# Patient Record
Sex: FEMALE | Race: WHITE | NOT HISPANIC OR LATINO | Employment: FULL TIME | URBAN - NONMETROPOLITAN AREA
[De-identification: names, ages, dates, MRNs, and addresses within clinical notes are randomized per-mention and may not be internally consistent; named-entity substitution may affect disease eponyms.]

---

## 2018-01-30 ENCOUNTER — TRANSCRIBE ORDERS (OUTPATIENT)
Dept: ADMINISTRATIVE | Facility: HOSPITAL | Age: 33
End: 2018-01-30

## 2018-01-30 ENCOUNTER — APPOINTMENT (OUTPATIENT)
Dept: LAB | Facility: HOSPITAL | Age: 33
End: 2018-01-30
Attending: FAMILY MEDICINE

## 2018-01-30 DIAGNOSIS — Z00.00 ENCOUNTER FOR GENERAL ADULT MEDICAL EXAMINATION WITHOUT ABNORMAL FINDINGS: Primary | ICD-10-CM

## 2018-01-30 LAB
25(OH)D3 SERPL-MCNC: 39.7 NG/ML (ref 30–100)
ALBUMIN SERPL-MCNC: 4.5 G/DL (ref 3.5–5)
ALBUMIN/GLOB SERPL: 1.6 G/DL (ref 1.1–2.5)
ALP SERPL-CCNC: 52 U/L (ref 24–120)
ALT SERPL W P-5'-P-CCNC: 29 U/L (ref 0–54)
ANION GAP SERPL CALCULATED.3IONS-SCNC: 12 MMOL/L (ref 4–13)
ARTICHOKE IGE QN: 75 MG/DL (ref 0–99)
AST SERPL-CCNC: 25 U/L (ref 7–45)
BASOPHILS # BLD AUTO: 0.05 10*3/MM3 (ref 0–0.2)
BASOPHILS NFR BLD AUTO: 1.2 % (ref 0–2)
BILIRUB SERPL-MCNC: 0.2 MG/DL (ref 0.1–1)
BUN BLD-MCNC: 19 MG/DL (ref 5–21)
BUN/CREAT SERPL: 22.1 (ref 7–25)
CALCIUM SPEC-SCNC: 9.5 MG/DL (ref 8.4–10.4)
CHLORIDE SERPL-SCNC: 105 MMOL/L (ref 98–110)
CHOLEST SERPL-MCNC: 136 MG/DL (ref 130–200)
CO2 SERPL-SCNC: 28 MMOL/L (ref 24–31)
CREAT BLD-MCNC: 0.86 MG/DL (ref 0.5–1.4)
DEPRECATED RDW RBC AUTO: 42.2 FL (ref 40–54)
EOSINOPHIL # BLD AUTO: 0.17 10*3/MM3 (ref 0–0.7)
EOSINOPHIL NFR BLD AUTO: 4 % (ref 0–4)
ERYTHROCYTE [DISTWIDTH] IN BLOOD BY AUTOMATED COUNT: 12.8 % (ref 12–15)
GFR SERPL CREATININE-BSD FRML MDRD: 76 ML/MIN/1.73
GLOBULIN UR ELPH-MCNC: 2.9 GM/DL
GLUCOSE BLD-MCNC: 88 MG/DL (ref 70–100)
HCT VFR BLD AUTO: 42.4 % (ref 37–47)
HDLC SERPL-MCNC: 53 MG/DL
HGB BLD-MCNC: 13.9 G/DL (ref 12–16)
IMM GRANULOCYTES # BLD: 0.01 10*3/MM3 (ref 0–0.03)
IMM GRANULOCYTES NFR BLD: 0.2 % (ref 0–5)
LDLC/HDLC SERPL: 1.35 {RATIO}
LYMPHOCYTES # BLD AUTO: 1.27 10*3/MM3 (ref 0.72–4.86)
LYMPHOCYTES NFR BLD AUTO: 29.5 % (ref 15–45)
MCH RBC QN AUTO: 29.4 PG (ref 28–32)
MCHC RBC AUTO-ENTMCNC: 32.8 G/DL (ref 33–36)
MCV RBC AUTO: 89.8 FL (ref 82–98)
MONOCYTES # BLD AUTO: 0.36 10*3/MM3 (ref 0.19–1.3)
MONOCYTES NFR BLD AUTO: 8.4 % (ref 4–12)
NEUTROPHILS # BLD AUTO: 2.44 10*3/MM3 (ref 1.87–8.4)
NEUTROPHILS NFR BLD AUTO: 56.7 % (ref 39–78)
NRBC BLD MANUAL-RTO: 0 /100 WBC (ref 0–0)
PLATELET # BLD AUTO: 212 10*3/MM3 (ref 130–400)
PMV BLD AUTO: 9.9 FL (ref 6–12)
POTASSIUM BLD-SCNC: 4 MMOL/L (ref 3.5–5.3)
PROT SERPL-MCNC: 7.4 G/DL (ref 6.3–8.7)
RBC # BLD AUTO: 4.72 10*6/MM3 (ref 4.2–5.4)
SODIUM BLD-SCNC: 145 MMOL/L (ref 135–145)
TRIGL SERPL-MCNC: 58 MG/DL (ref 0–149)
TSH SERPL DL<=0.05 MIU/L-ACNC: 1.79 MIU/ML (ref 0.47–4.68)
WBC NRBC COR # BLD: 4.3 10*3/MM3 (ref 4.8–10.8)

## 2018-01-30 PROCEDURE — 36415 COLL VENOUS BLD VENIPUNCTURE: CPT

## 2018-01-30 PROCEDURE — 80061 LIPID PANEL: CPT | Performed by: FAMILY MEDICINE

## 2018-01-30 PROCEDURE — 84443 ASSAY THYROID STIM HORMONE: CPT | Performed by: FAMILY MEDICINE

## 2018-01-30 PROCEDURE — 85025 COMPLETE CBC W/AUTO DIFF WBC: CPT | Performed by: FAMILY MEDICINE

## 2018-01-30 PROCEDURE — 80053 COMPREHEN METABOLIC PANEL: CPT | Performed by: FAMILY MEDICINE

## 2018-01-30 PROCEDURE — 82306 VITAMIN D 25 HYDROXY: CPT | Performed by: FAMILY MEDICINE

## 2018-04-18 ENCOUNTER — PROCEDURE VISIT (OUTPATIENT)
Dept: OBSTETRICS AND GYNECOLOGY | Facility: CLINIC | Age: 33
End: 2018-04-18

## 2018-04-18 ENCOUNTER — OFFICE VISIT (OUTPATIENT)
Dept: OBSTETRICS AND GYNECOLOGY | Facility: CLINIC | Age: 33
End: 2018-04-18

## 2018-04-18 VITALS
DIASTOLIC BLOOD PRESSURE: 62 MMHG | BODY MASS INDEX: 20.31 KG/M2 | WEIGHT: 134 LBS | HEIGHT: 68 IN | SYSTOLIC BLOOD PRESSURE: 110 MMHG

## 2018-04-18 DIAGNOSIS — Z78.9 NON-SMOKER: ICD-10-CM

## 2018-04-18 DIAGNOSIS — O20.9 VAGINAL BLEEDING IN PREGNANCY, FIRST TRIMESTER: ICD-10-CM

## 2018-04-18 DIAGNOSIS — O20.0 THREATENED MISCARRIAGE: Primary | ICD-10-CM

## 2018-04-18 DIAGNOSIS — O20.0 THREATENED ABORTION: Primary | ICD-10-CM

## 2018-04-18 PROCEDURE — 99213 OFFICE O/P EST LOW 20 MIN: CPT | Performed by: NURSE PRACTITIONER

## 2018-04-18 PROCEDURE — 76817 TRANSVAGINAL US OBSTETRIC: CPT | Performed by: OBSTETRICS & GYNECOLOGY

## 2018-04-18 NOTE — PROGRESS NOTES
"Subjective   Oneil Kothari is a 32 y.o. female.     Positive pregnancy test, spotting.     Pt is concerned about spotting r/t hx of miscarriage.         The following portions of the patient's history were reviewed and updated as appropriate: allergies, current medications, past family history, past medical history, past social history, past surgical history and problem list.    /62   Ht 172.7 cm (68\")   Wt 60.8 kg (134 lb)   LMP 03/09/2018   BMI 20.37 kg/m²     Review of Systems   Constitutional: Positive for fatigue. Negative for activity change, appetite change and fever.   Respiratory: Negative for apnea and shortness of breath.    Cardiovascular: Negative for chest pain and palpitations.   Gastrointestinal: Negative for abdominal distention, abdominal pain, constipation, diarrhea, nausea and vomiting.   Endocrine: Negative for cold intolerance and heat intolerance.   Genitourinary: Negative for difficulty urinating, frequency, menstrual problem, pelvic pain, vaginal discharge and vaginal pain.        Spotting, none today.   Neurological: Negative for headaches.   Psychiatric/Behavioral: Negative for agitation and sleep disturbance.       Objective   Physical Exam   Constitutional: She is oriented to person, place, and time. She appears well-developed.   Eyes: Right eye exhibits no discharge. Left eye exhibits no discharge.   Cardiovascular: Normal rate and regular rhythm.    Pulmonary/Chest: Effort normal and breath sounds normal.   Neurological: She is alert and oriented to person, place, and time.   Skin: Skin is warm.   Psychiatric: She has a normal mood and affect. Her behavior is normal. Judgment and thought content normal.   Vitals reviewed.      Assessment/Plan    LMP 3/9/91140    Discussed US with pt.   US indicates gestational sac and yolk sac are observed and appear normal.     HCG quant today  Blood type B positive.   RV 2 wks US and New OB visit based on labs.     Oneil was seen today " for threatened miscarriage.    Diagnoses and all orders for this visit:    Threatened miscarriage  -     HCG, B-subunit, Quantitative    Vaginal bleeding in pregnancy, first trimester    BMI 20.0-20.9, adult    Non-smoker

## 2018-04-19 LAB — HCG INTACT+B SERPL-ACNC: NORMAL MIU/ML

## 2018-05-03 ENCOUNTER — INITIAL PRENATAL (OUTPATIENT)
Dept: OBSTETRICS AND GYNECOLOGY | Facility: CLINIC | Age: 33
End: 2018-05-03

## 2018-05-03 ENCOUNTER — PROCEDURE VISIT (OUTPATIENT)
Dept: OBSTETRICS AND GYNECOLOGY | Facility: CLINIC | Age: 33
End: 2018-05-03

## 2018-05-03 VITALS — BODY MASS INDEX: 20.68 KG/M2 | SYSTOLIC BLOOD PRESSURE: 104 MMHG | DIASTOLIC BLOOD PRESSURE: 82 MMHG | WEIGHT: 136 LBS

## 2018-05-03 DIAGNOSIS — O36.80X0 ENCOUNTER TO DETERMINE FETAL VIABILITY OF PREGNANCY, SINGLE OR UNSPECIFIED FETUS: Primary | ICD-10-CM

## 2018-05-03 DIAGNOSIS — Z34.81 ENCOUNTER FOR SUPERVISION OF OTHER NORMAL PREGNANCY IN FIRST TRIMESTER: Primary | ICD-10-CM

## 2018-05-03 PROBLEM — Z34.90 SUPERVISION OF NORMAL PREGNANCY: Status: ACTIVE | Noted: 2018-05-03

## 2018-05-03 PROCEDURE — 0501F PRENATAL FLOW SHEET: CPT | Performed by: OBSTETRICS & GYNECOLOGY

## 2018-05-03 PROCEDURE — 76817 TRANSVAGINAL US OBSTETRIC: CPT | Performed by: OBSTETRICS & GYNECOLOGY

## 2018-05-03 NOTE — PROGRESS NOTES
New OB, prior uncomplicated pregnancy and vaginal delivery   Had spotting 2 weeks ago, but has resolved  Discussed normal prenatal routines  New OB labs today, discussed genetic screening options  EDC by today's US consistent with menstrual dating

## 2018-05-05 LAB
ABO GROUP BLD: NORMAL
AMPHETAMINES UR QL SCN: NEGATIVE NG/ML
BACTERIA UR CULT: NORMAL
BACTERIA UR CULT: NORMAL
BARBITURATES UR QL SCN: NEGATIVE NG/ML
BASOPHILS # BLD AUTO: 0.1 X10E3/UL (ref 0–0.2)
BASOPHILS NFR BLD AUTO: 1 %
BENZODIAZ UR QL: NEGATIVE NG/ML
BLD GP AB SCN SERPL QL: NEGATIVE
BZE UR QL: NEGATIVE NG/ML
C TRACH RRNA SPEC QL NAA+PROBE: NEGATIVE
CANNABINOIDS UR QL SCN: NEGATIVE NG/ML
EOSINOPHIL # BLD AUTO: 0.2 X10E3/UL (ref 0–0.4)
EOSINOPHIL NFR BLD AUTO: 3 %
ERYTHROCYTE [DISTWIDTH] IN BLOOD BY AUTOMATED COUNT: 13.7 % (ref 12.3–15.4)
HBV SURFACE AG SERPL QL IA: NEGATIVE
HCT VFR BLD AUTO: 38.6 % (ref 34–46.6)
HGB BLD-MCNC: 13 G/DL (ref 11.1–15.9)
HIV 1+2 AB+HIV1 P24 AG SERPL QL IA: NON REACTIVE
IMM GRANULOCYTES # BLD: 0 X10E3/UL (ref 0–0.1)
IMM GRANULOCYTES NFR BLD: 0 %
LYMPHOCYTES # BLD AUTO: 1.8 X10E3/UL (ref 0.7–3.1)
LYMPHOCYTES NFR BLD AUTO: 23 %
MCH RBC QN AUTO: 30.3 PG (ref 26.6–33)
MCHC RBC AUTO-ENTMCNC: 33.7 G/DL (ref 31.5–35.7)
MCV RBC AUTO: 90 FL (ref 79–97)
METHADONE UR QL SCN: NEGATIVE NG/ML
MONOCYTES # BLD AUTO: 0.5 X10E3/UL (ref 0.1–0.9)
MONOCYTES NFR BLD AUTO: 6 %
N GONORRHOEA RRNA SPEC QL NAA+PROBE: NEGATIVE
NEUTROPHILS # BLD AUTO: 5.4 X10E3/UL (ref 1.4–7)
NEUTROPHILS NFR BLD AUTO: 67 %
OPIATES UR QL: NEGATIVE NG/ML
PCP UR QL: NEGATIVE NG/ML
PLATELET # BLD AUTO: 256 X10E3/UL (ref 150–379)
PROPOXYPH UR QL SCN: NEGATIVE NG/ML
RBC # BLD AUTO: 4.29 X10E6/UL (ref 3.77–5.28)
RH BLD: POSITIVE
RPR SER QL: NON REACTIVE
RUBV IGG SERPL IA-ACNC: 4.65 INDEX
WBC # BLD AUTO: 7.9 X10E3/UL (ref 3.4–10.8)

## 2018-06-11 ENCOUNTER — ROUTINE PRENATAL (OUTPATIENT)
Dept: OBSTETRICS AND GYNECOLOGY | Facility: CLINIC | Age: 33
End: 2018-06-11

## 2018-06-11 VITALS — WEIGHT: 136 LBS | BODY MASS INDEX: 20.68 KG/M2 | SYSTOLIC BLOOD PRESSURE: 118 MMHG | DIASTOLIC BLOOD PRESSURE: 64 MMHG

## 2018-06-11 DIAGNOSIS — Z34.82 ENCOUNTER FOR SUPERVISION OF OTHER NORMAL PREGNANCY IN SECOND TRIMESTER: Primary | ICD-10-CM

## 2018-06-11 PROCEDURE — 0502F SUBSEQUENT PRENATAL CARE: CPT | Performed by: OBSTETRICS & GYNECOLOGY

## 2018-06-11 NOTE — PROGRESS NOTES
Feeling well, minimal nausea  Normal prenatal labs  Discussed round ligament pain  Declines genetic screening

## 2018-07-02 ENCOUNTER — TELEPHONE (OUTPATIENT)
Dept: OBSTETRICS AND GYNECOLOGY | Facility: CLINIC | Age: 33
End: 2018-07-02

## 2018-07-09 ENCOUNTER — ROUTINE PRENATAL (OUTPATIENT)
Dept: OBSTETRICS AND GYNECOLOGY | Facility: CLINIC | Age: 33
End: 2018-07-09

## 2018-07-09 VITALS — SYSTOLIC BLOOD PRESSURE: 122 MMHG | DIASTOLIC BLOOD PRESSURE: 78 MMHG | WEIGHT: 142 LBS | BODY MASS INDEX: 21.59 KG/M2

## 2018-07-09 DIAGNOSIS — Z34.82 ENCOUNTER FOR SUPERVISION OF OTHER NORMAL PREGNANCY IN SECOND TRIMESTER: Primary | ICD-10-CM

## 2018-07-09 PROCEDURE — 0502F SUBSEQUENT PRENATAL CARE: CPT | Performed by: OBSTETRICS & GYNECOLOGY

## 2018-08-06 ENCOUNTER — ROUTINE PRENATAL (OUTPATIENT)
Dept: OBSTETRICS AND GYNECOLOGY | Facility: CLINIC | Age: 33
End: 2018-08-06

## 2018-08-06 VITALS — SYSTOLIC BLOOD PRESSURE: 132 MMHG | DIASTOLIC BLOOD PRESSURE: 70 MMHG | BODY MASS INDEX: 21.74 KG/M2 | WEIGHT: 143 LBS

## 2018-08-06 DIAGNOSIS — Z34.82 ENCOUNTER FOR SUPERVISION OF OTHER NORMAL PREGNANCY IN SECOND TRIMESTER: Primary | ICD-10-CM

## 2018-08-06 PROCEDURE — 0502F SUBSEQUENT PRENATAL CARE: CPT | Performed by: OBSTETRICS & GYNECOLOGY

## 2018-08-06 NOTE — PROGRESS NOTES
Feeling more fetal movement  Feeling occasional palpitation, 1-2 a month, no chest pain  Normal anatomy US  Cervical length 2.9cm

## 2018-09-04 ENCOUNTER — ROUTINE PRENATAL (OUTPATIENT)
Dept: OBSTETRICS AND GYNECOLOGY | Facility: CLINIC | Age: 33
End: 2018-09-04

## 2018-09-04 VITALS — DIASTOLIC BLOOD PRESSURE: 68 MMHG | WEIGHT: 148 LBS | SYSTOLIC BLOOD PRESSURE: 108 MMHG | BODY MASS INDEX: 22.5 KG/M2

## 2018-09-04 DIAGNOSIS — Z34.82 ENCOUNTER FOR SUPERVISION OF OTHER NORMAL PREGNANCY IN SECOND TRIMESTER: Primary | ICD-10-CM

## 2018-09-04 PROCEDURE — 0502F SUBSEQUENT PRENATAL CARE: CPT | Performed by: OBSTETRICS & GYNECOLOGY

## 2018-09-18 ENCOUNTER — ROUTINE PRENATAL (OUTPATIENT)
Dept: OBSTETRICS AND GYNECOLOGY | Facility: CLINIC | Age: 33
End: 2018-09-18

## 2018-09-18 VITALS — SYSTOLIC BLOOD PRESSURE: 104 MMHG | DIASTOLIC BLOOD PRESSURE: 66 MMHG | WEIGHT: 153 LBS | BODY MASS INDEX: 23.26 KG/M2

## 2018-09-18 DIAGNOSIS — Z34.83 ENCOUNTER FOR SUPERVISION OF OTHER NORMAL PREGNANCY IN THIRD TRIMESTER: Primary | ICD-10-CM

## 2018-09-18 LAB
GLUCOSE 1H P 50 G GLC PO SERPL-MCNC: 104 MG/DL (ref 70–140)
HGB BLD-MCNC: 11.9 G/DL (ref 12–16)

## 2018-09-18 PROCEDURE — 0502F SUBSEQUENT PRENATAL CARE: CPT | Performed by: OBSTETRICS & GYNECOLOGY

## 2018-10-02 ENCOUNTER — ROUTINE PRENATAL (OUTPATIENT)
Dept: OBSTETRICS AND GYNECOLOGY | Facility: CLINIC | Age: 33
End: 2018-10-02

## 2018-10-02 VITALS — BODY MASS INDEX: 22.96 KG/M2 | SYSTOLIC BLOOD PRESSURE: 94 MMHG | DIASTOLIC BLOOD PRESSURE: 70 MMHG | WEIGHT: 151 LBS

## 2018-10-02 DIAGNOSIS — Z34.83 ENCOUNTER FOR SUPERVISION OF OTHER NORMAL PREGNANCY IN THIRD TRIMESTER: Primary | ICD-10-CM

## 2018-10-02 PROCEDURE — 90715 TDAP VACCINE 7 YRS/> IM: CPT | Performed by: OBSTETRICS & GYNECOLOGY

## 2018-10-02 PROCEDURE — 0502F SUBSEQUENT PRENATAL CARE: CPT | Performed by: OBSTETRICS & GYNECOLOGY

## 2018-10-02 PROCEDURE — 90471 IMMUNIZATION ADMIN: CPT | Performed by: OBSTETRICS & GYNECOLOGY

## 2018-10-02 NOTE — PROGRESS NOTES
Good fetal movement  Normal Glucola and Hgb  US next visit for size less than dates   labor precautions  Tdap today, plans flu vaccine at work

## 2018-10-16 ENCOUNTER — ROUTINE PRENATAL (OUTPATIENT)
Dept: OBSTETRICS AND GYNECOLOGY | Facility: CLINIC | Age: 33
End: 2018-10-16

## 2018-10-16 ENCOUNTER — PROCEDURE VISIT (OUTPATIENT)
Dept: OBSTETRICS AND GYNECOLOGY | Facility: CLINIC | Age: 33
End: 2018-10-16

## 2018-10-16 VITALS — DIASTOLIC BLOOD PRESSURE: 78 MMHG | WEIGHT: 157 LBS | BODY MASS INDEX: 23.87 KG/M2 | SYSTOLIC BLOOD PRESSURE: 110 MMHG

## 2018-10-16 DIAGNOSIS — O36.5930 POOR FETAL GROWTH AFFECTING MANAGEMENT OF MOTHER IN THIRD TRIMESTER, SINGLE OR UNSPECIFIED FETUS: Primary | ICD-10-CM

## 2018-10-16 DIAGNOSIS — Z34.83 ENCOUNTER FOR SUPERVISION OF OTHER NORMAL PREGNANCY IN THIRD TRIMESTER: Primary | ICD-10-CM

## 2018-10-16 PROCEDURE — 76816 OB US FOLLOW-UP PER FETUS: CPT | Performed by: OBSTETRICS & GYNECOLOGY

## 2018-10-16 PROCEDURE — 0502F SUBSEQUENT PRENATAL CARE: CPT | Performed by: OBSTETRICS & GYNECOLOGY

## 2018-10-16 NOTE — PROGRESS NOTES
Good fetal movement  Received flu vaccine yesterday  US today 34% growth, KATIE 12cm   labor precautions

## 2018-10-30 ENCOUNTER — ROUTINE PRENATAL (OUTPATIENT)
Dept: OBSTETRICS AND GYNECOLOGY | Facility: CLINIC | Age: 33
End: 2018-10-30

## 2018-10-30 VITALS — BODY MASS INDEX: 23.72 KG/M2 | DIASTOLIC BLOOD PRESSURE: 82 MMHG | WEIGHT: 156 LBS | SYSTOLIC BLOOD PRESSURE: 122 MMHG

## 2018-10-30 DIAGNOSIS — Z34.83 ENCOUNTER FOR SUPERVISION OF OTHER NORMAL PREGNANCY IN THIRD TRIMESTER: Primary | ICD-10-CM

## 2018-10-30 PROCEDURE — 0502F SUBSEQUENT PRENATAL CARE: CPT | Performed by: OBSTETRICS & GYNECOLOGY

## 2018-11-13 ENCOUNTER — ROUTINE PRENATAL (OUTPATIENT)
Dept: OBSTETRICS AND GYNECOLOGY | Facility: CLINIC | Age: 33
End: 2018-11-13

## 2018-11-13 VITALS — DIASTOLIC BLOOD PRESSURE: 82 MMHG | WEIGHT: 160 LBS | BODY MASS INDEX: 24.33 KG/M2 | SYSTOLIC BLOOD PRESSURE: 110 MMHG

## 2018-11-13 DIAGNOSIS — Z34.83 ENCOUNTER FOR SUPERVISION OF OTHER NORMAL PREGNANCY IN THIRD TRIMESTER: Primary | ICD-10-CM

## 2018-11-13 PROCEDURE — 0502F SUBSEQUENT PRENATAL CARE: CPT | Performed by: OBSTETRICS & GYNECOLOGY

## 2018-11-20 ENCOUNTER — ROUTINE PRENATAL (OUTPATIENT)
Dept: OBSTETRICS AND GYNECOLOGY | Facility: CLINIC | Age: 33
End: 2018-11-20

## 2018-11-20 VITALS — SYSTOLIC BLOOD PRESSURE: 120 MMHG | BODY MASS INDEX: 24.63 KG/M2 | WEIGHT: 162 LBS | DIASTOLIC BLOOD PRESSURE: 78 MMHG

## 2018-11-20 DIAGNOSIS — Z34.83 ENCOUNTER FOR SUPERVISION OF OTHER NORMAL PREGNANCY IN THIRD TRIMESTER: Primary | ICD-10-CM

## 2018-11-20 PROCEDURE — 0502F SUBSEQUENT PRENATAL CARE: CPT | Performed by: OBSTETRICS & GYNECOLOGY

## 2018-11-22 LAB
C TRACH RRNA SPEC QL NAA+PROBE: NEGATIVE
GP B STREP DNA SPEC QL NAA+PROBE: NEGATIVE
N GONORRHOEA RRNA SPEC QL NAA+PROBE: NEGATIVE

## 2018-11-27 ENCOUNTER — ROUTINE PRENATAL (OUTPATIENT)
Dept: OBSTETRICS AND GYNECOLOGY | Facility: CLINIC | Age: 33
End: 2018-11-27

## 2018-11-27 VITALS — DIASTOLIC BLOOD PRESSURE: 98 MMHG | WEIGHT: 163 LBS | BODY MASS INDEX: 24.78 KG/M2 | SYSTOLIC BLOOD PRESSURE: 126 MMHG

## 2018-11-27 DIAGNOSIS — Z34.83 ENCOUNTER FOR SUPERVISION OF OTHER NORMAL PREGNANCY IN THIRD TRIMESTER: Primary | ICD-10-CM

## 2018-11-27 PROCEDURE — 0502F SUBSEQUENT PRENATAL CARE: CPT | Performed by: OBSTETRICS & GYNECOLOGY

## 2018-11-27 NOTE — PROGRESS NOTES
Good fetal movement, no headache (BP was taken through sweater)  GBS negative  Discussed no routine use of episiotomy, but indications  US for size less than dates next visit

## 2018-12-04 ENCOUNTER — PROCEDURE VISIT (OUTPATIENT)
Dept: OBSTETRICS AND GYNECOLOGY | Facility: CLINIC | Age: 33
End: 2018-12-04

## 2018-12-04 ENCOUNTER — ROUTINE PRENATAL (OUTPATIENT)
Dept: OBSTETRICS AND GYNECOLOGY | Facility: CLINIC | Age: 33
End: 2018-12-04

## 2018-12-04 VITALS — BODY MASS INDEX: 25.09 KG/M2 | DIASTOLIC BLOOD PRESSURE: 96 MMHG | SYSTOLIC BLOOD PRESSURE: 124 MMHG | WEIGHT: 165 LBS

## 2018-12-04 DIAGNOSIS — Z34.83 ENCOUNTER FOR SUPERVISION OF OTHER NORMAL PREGNANCY IN THIRD TRIMESTER: Primary | ICD-10-CM

## 2018-12-04 DIAGNOSIS — O36.5930 POOR FETAL GROWTH AFFECTING MANAGEMENT OF MOTHER IN THIRD TRIMESTER, SINGLE OR UNSPECIFIED FETUS: Primary | ICD-10-CM

## 2018-12-04 PROCEDURE — 0502F SUBSEQUENT PRENATAL CARE: CPT | Performed by: OBSTETRICS & GYNECOLOGY

## 2018-12-04 PROCEDURE — 76816 OB US FOLLOW-UP PER FETUS: CPT | Performed by: OBSTETRICS & GYNECOLOGY

## 2018-12-04 NOTE — PROGRESS NOTES
Good fetal movement  US today shows EFW 2650g, 6%, KATIE 14cm, S/D 2.5  First child was 6# 8oz at 40 weeks  Feeling well, no headache  DTR trace, no ankle edema  Patient will check BP at work and home

## 2018-12-11 ENCOUNTER — ANESTHESIA (OUTPATIENT)
Dept: LABOR AND DELIVERY | Facility: HOSPITAL | Age: 33
End: 2018-12-11

## 2018-12-11 ENCOUNTER — HOSPITAL ENCOUNTER (INPATIENT)
Facility: HOSPITAL | Age: 33
LOS: 2 days | Discharge: HOME | End: 2018-12-13
Attending: OBSTETRICS & GYNECOLOGY | Admitting: OBSTETRICS & GYNECOLOGY

## 2018-12-11 ENCOUNTER — ROUTINE PRENATAL (OUTPATIENT)
Dept: OBSTETRICS AND GYNECOLOGY | Facility: CLINIC | Age: 33
End: 2018-12-11

## 2018-12-11 ENCOUNTER — ANESTHESIA EVENT (OUTPATIENT)
Dept: LABOR AND DELIVERY | Facility: HOSPITAL | Age: 33
End: 2018-12-11

## 2018-12-11 VITALS — DIASTOLIC BLOOD PRESSURE: 100 MMHG | BODY MASS INDEX: 24.94 KG/M2 | SYSTOLIC BLOOD PRESSURE: 140 MMHG | WEIGHT: 164 LBS

## 2018-12-11 DIAGNOSIS — Z34.83 ENCOUNTER FOR SUPERVISION OF OTHER NORMAL PREGNANCY IN THIRD TRIMESTER: Primary | ICD-10-CM

## 2018-12-11 DIAGNOSIS — O13.3 GESTATIONAL HYPERTENSION, THIRD TRIMESTER: ICD-10-CM

## 2018-12-11 PROBLEM — O13.9 GESTATIONAL HYPERTENSION: Status: ACTIVE | Noted: 2018-12-11

## 2018-12-11 LAB
ABO GROUP BLD: NORMAL
BASOPHILS # BLD AUTO: 0.06 10*3/MM3 (ref 0–0.2)
BASOPHILS NFR BLD AUTO: 0.8 % (ref 0–2)
BLD GP AB SCN SERPL QL: NEGATIVE
DEPRECATED RDW RBC AUTO: 43.5 FL (ref 40–54)
EOSINOPHIL # BLD AUTO: 0.1 10*3/MM3 (ref 0–0.7)
EOSINOPHIL NFR BLD AUTO: 1.3 % (ref 0–4)
ERYTHROCYTE [DISTWIDTH] IN BLOOD BY AUTOMATED COUNT: 13.5 % (ref 12–15)
HCT VFR BLD AUTO: 39.3 % (ref 37–47)
HGB BLD-MCNC: 13.7 G/DL (ref 12–16)
IMM GRANULOCYTES # BLD: 0.03 10*3/MM3 (ref 0–0.03)
IMM GRANULOCYTES NFR BLD: 0.4 % (ref 0–5)
LYMPHOCYTES # BLD AUTO: 1.44 10*3/MM3 (ref 0.72–4.86)
LYMPHOCYTES NFR BLD AUTO: 18.2 % (ref 15–45)
MCH RBC QN AUTO: 30.9 PG (ref 28–32)
MCHC RBC AUTO-ENTMCNC: 34.9 G/DL (ref 33–36)
MCV RBC AUTO: 88.5 FL (ref 82–98)
MONOCYTES # BLD AUTO: 0.55 10*3/MM3 (ref 0.19–1.3)
MONOCYTES NFR BLD AUTO: 6.9 % (ref 4–12)
NEUTROPHILS # BLD AUTO: 5.74 10*3/MM3 (ref 1.87–8.4)
NEUTROPHILS NFR BLD AUTO: 72.4 % (ref 39–78)
NRBC BLD MANUAL-RTO: 0 /100 WBC (ref 0–0)
PLATELET # BLD AUTO: 203 10*3/MM3 (ref 130–400)
PMV BLD AUTO: 11 FL (ref 6–12)
RBC # BLD AUTO: 4.44 10*6/MM3 (ref 4.2–5.4)
RH BLD: POSITIVE
T&S EXPIRATION DATE: NORMAL
WBC NRBC COR # BLD: 7.92 10*3/MM3 (ref 4.8–10.8)

## 2018-12-11 PROCEDURE — 25010000002 ROPIVACAINE PER 1 MG: Performed by: NURSE ANESTHETIST, CERTIFIED REGISTERED

## 2018-12-11 PROCEDURE — 51702 INSERT TEMP BLADDER CATH: CPT

## 2018-12-11 PROCEDURE — 86901 BLOOD TYPING SEROLOGIC RH(D): CPT | Performed by: OBSTETRICS & GYNECOLOGY

## 2018-12-11 PROCEDURE — C1755 CATHETER, INTRASPINAL: HCPCS | Performed by: NURSE ANESTHETIST, CERTIFIED REGISTERED

## 2018-12-11 PROCEDURE — 59400 OBSTETRICAL CARE: CPT | Performed by: OBSTETRICS & GYNECOLOGY

## 2018-12-11 PROCEDURE — 0502F SUBSEQUENT PRENATAL CARE: CPT | Performed by: OBSTETRICS & GYNECOLOGY

## 2018-12-11 PROCEDURE — 85025 COMPLETE CBC W/AUTO DIFF WBC: CPT | Performed by: OBSTETRICS & GYNECOLOGY

## 2018-12-11 PROCEDURE — 86900 BLOOD TYPING SEROLOGIC ABO: CPT | Performed by: OBSTETRICS & GYNECOLOGY

## 2018-12-11 PROCEDURE — 86850 RBC ANTIBODY SCREEN: CPT | Performed by: OBSTETRICS & GYNECOLOGY

## 2018-12-11 RX ORDER — SODIUM CHLORIDE 0.9 % (FLUSH) 0.9 %
1-10 SYRINGE (ML) INJECTION AS NEEDED
Status: DISCONTINUED | OUTPATIENT
Start: 2018-12-11 | End: 2018-12-11 | Stop reason: HOSPADM

## 2018-12-11 RX ORDER — ONDANSETRON 2 MG/ML
4 INJECTION INTRAMUSCULAR; INTRAVENOUS EVERY 6 HOURS PRN
Status: DISCONTINUED | OUTPATIENT
Start: 2018-12-11 | End: 2018-12-13 | Stop reason: HOSPADM

## 2018-12-11 RX ORDER — CALCIUM CARBONATE 200(500)MG
2 TABLET,CHEWABLE ORAL 3 TIMES DAILY PRN
Status: DISCONTINUED | OUTPATIENT
Start: 2018-12-11 | End: 2018-12-11 | Stop reason: HOSPADM

## 2018-12-11 RX ORDER — SODIUM CHLORIDE, SODIUM LACTATE, POTASSIUM CHLORIDE, CALCIUM CHLORIDE 600; 310; 30; 20 MG/100ML; MG/100ML; MG/100ML; MG/100ML
125 INJECTION, SOLUTION INTRAVENOUS CONTINUOUS
Status: DISCONTINUED | OUTPATIENT
Start: 2018-12-11 | End: 2018-12-13 | Stop reason: HOSPADM

## 2018-12-11 RX ORDER — LIDOCAINE HYDROCHLORIDE 10 MG/ML
5 INJECTION, SOLUTION EPIDURAL; INFILTRATION; INTRACAUDAL; PERINEURAL AS NEEDED
Status: DISCONTINUED | OUTPATIENT
Start: 2018-12-11 | End: 2018-12-11 | Stop reason: HOSPADM

## 2018-12-11 RX ORDER — LIDOCAINE HYDROCHLORIDE 10 MG/ML
INJECTION, SOLUTION INFILTRATION; PERINEURAL AS NEEDED
Status: DISCONTINUED | OUTPATIENT
Start: 2018-12-11 | End: 2018-12-12 | Stop reason: SURG

## 2018-12-11 RX ORDER — SODIUM CHLORIDE 0.9 % (FLUSH) 0.9 %
3 SYRINGE (ML) INJECTION EVERY 12 HOURS SCHEDULED
Status: DISCONTINUED | OUTPATIENT
Start: 2018-12-11 | End: 2018-12-11 | Stop reason: HOSPADM

## 2018-12-11 RX ORDER — ACETAMINOPHEN 325 MG/1
650 TABLET ORAL EVERY 4 HOURS PRN
Status: DISCONTINUED | OUTPATIENT
Start: 2018-12-11 | End: 2018-12-13 | Stop reason: HOSPADM

## 2018-12-11 RX ORDER — PRENATAL VIT/IRON FUM/FOLIC AC 27MG-0.8MG
1 TABLET ORAL DAILY
Status: DISCONTINUED | OUTPATIENT
Start: 2018-12-12 | End: 2018-12-13 | Stop reason: HOSPADM

## 2018-12-11 RX ORDER — IBUPROFEN 200 MG
600 TABLET ORAL EVERY 8 HOURS PRN
Status: DISCONTINUED | OUTPATIENT
Start: 2018-12-11 | End: 2018-12-13 | Stop reason: HOSPADM

## 2018-12-11 RX ORDER — ACETAMINOPHEN 325 MG/1
650 TABLET ORAL EVERY 4 HOURS PRN
Status: DISCONTINUED | OUTPATIENT
Start: 2018-12-11 | End: 2018-12-11 | Stop reason: HOSPADM

## 2018-12-11 RX ORDER — TRISODIUM CITRATE DIHYDRATE AND CITRIC ACID MONOHYDRATE 500; 334 MG/5ML; MG/5ML
30 SOLUTION ORAL ONCE AS NEEDED
Status: DISCONTINUED | OUTPATIENT
Start: 2018-12-11 | End: 2018-12-11 | Stop reason: HOSPADM

## 2018-12-11 RX ORDER — MISOPROSTOL 200 UG/1
600 TABLET ORAL ONCE AS NEEDED
Status: DISCONTINUED | OUTPATIENT
Start: 2018-12-11 | End: 2018-12-13 | Stop reason: HOSPADM

## 2018-12-11 RX ORDER — ONDANSETRON 2 MG/ML
4 INJECTION INTRAMUSCULAR; INTRAVENOUS EVERY 6 HOURS PRN
Status: DISCONTINUED | OUTPATIENT
Start: 2018-12-11 | End: 2018-12-11 | Stop reason: HOSPADM

## 2018-12-11 RX ORDER — METHYLERGONOVINE MALEATE 0.2 MG/ML
200 INJECTION INTRAVENOUS ONCE AS NEEDED
Status: DISCONTINUED | OUTPATIENT
Start: 2018-12-11 | End: 2018-12-13 | Stop reason: HOSPADM

## 2018-12-11 RX ORDER — TERBUTALINE SULFATE 1 MG/ML
0.25 INJECTION, SOLUTION SUBCUTANEOUS AS NEEDED
Status: DISCONTINUED | OUTPATIENT
Start: 2018-12-11 | End: 2018-12-11 | Stop reason: HOSPADM

## 2018-12-11 RX ORDER — MORPHINE SULFATE 2 MG/ML
1 INJECTION, SOLUTION INTRAMUSCULAR; INTRAVENOUS EVERY 4 HOURS PRN
Status: DISCONTINUED | OUTPATIENT
Start: 2018-12-11 | End: 2018-12-13 | Stop reason: HOSPADM

## 2018-12-11 RX ORDER — OXYTOCIN/0.9 % SODIUM CHLORIDE 30/500 ML
2-30 PLASTIC BAG, INJECTION (ML) INTRAVENOUS
Status: DISCONTINUED | OUTPATIENT
Start: 2018-12-11 | End: 2018-12-11 | Stop reason: HOSPADM

## 2018-12-11 RX ORDER — CALCIUM CARBONATE 200(500)MG
2 TABLET,CHEWABLE ORAL 3 TIMES DAILY PRN
Status: DISCONTINUED | OUTPATIENT
Start: 2018-12-11 | End: 2018-12-13 | Stop reason: HOSPADM

## 2018-12-11 RX ORDER — SODIUM CHLORIDE 0.9 % (FLUSH) 0.9 %
1-10 SYRINGE (ML) INJECTION AS NEEDED
Status: DISCONTINUED | OUTPATIENT
Start: 2018-12-11 | End: 2018-12-13 | Stop reason: HOSPADM

## 2018-12-11 RX ORDER — DOCUSATE SODIUM 100 MG/1
100 CAPSULE, LIQUID FILLED ORAL 2 TIMES DAILY PRN
Status: DISCONTINUED | OUTPATIENT
Start: 2018-12-11 | End: 2018-12-13 | Stop reason: HOSPADM

## 2018-12-11 RX ORDER — MISOPROSTOL 200 UG/1
800 TABLET ORAL AS NEEDED
Status: DISCONTINUED | OUTPATIENT
Start: 2018-12-11 | End: 2018-12-11 | Stop reason: HOSPADM

## 2018-12-11 RX ORDER — ONDANSETRON 4 MG/1
4 TABLET, FILM COATED ORAL EVERY 8 HOURS PRN
Status: DISCONTINUED | OUTPATIENT
Start: 2018-12-11 | End: 2018-12-13 | Stop reason: HOSPADM

## 2018-12-11 RX ORDER — ONDANSETRON 4 MG/1
4 TABLET, FILM COATED ORAL EVERY 6 HOURS PRN
Status: DISCONTINUED | OUTPATIENT
Start: 2018-12-11 | End: 2018-12-11 | Stop reason: HOSPADM

## 2018-12-11 RX ORDER — METHYLERGONOVINE MALEATE 0.2 MG/ML
200 INJECTION INTRAVENOUS ONCE AS NEEDED
Status: DISCONTINUED | OUTPATIENT
Start: 2018-12-11 | End: 2018-12-11 | Stop reason: HOSPADM

## 2018-12-11 RX ORDER — ONDANSETRON 4 MG/1
4 TABLET, ORALLY DISINTEGRATING ORAL EVERY 6 HOURS PRN
Status: DISCONTINUED | OUTPATIENT
Start: 2018-12-11 | End: 2018-12-11 | Stop reason: HOSPADM

## 2018-12-11 RX ORDER — BISACODYL 10 MG
10 SUPPOSITORY, RECTAL RECTAL DAILY PRN
Status: DISCONTINUED | OUTPATIENT
Start: 2018-12-12 | End: 2018-12-13 | Stop reason: HOSPADM

## 2018-12-11 RX ORDER — CARBOPROST TROMETHAMINE 250 UG/ML
250 INJECTION, SOLUTION INTRAMUSCULAR AS NEEDED
Status: DISCONTINUED | OUTPATIENT
Start: 2018-12-11 | End: 2018-12-11 | Stop reason: HOSPADM

## 2018-12-11 RX ORDER — LIDOCAINE HYDROCHLORIDE 20 MG/ML
INJECTION, SOLUTION EPIDURAL; INFILTRATION; INTRACAUDAL; PERINEURAL AS NEEDED
Status: DISCONTINUED | OUTPATIENT
Start: 2018-12-11 | End: 2018-12-12 | Stop reason: SURG

## 2018-12-11 RX ORDER — OXYTOCIN/RINGER'S LACTATE 20/1000 ML
999 PLASTIC BAG, INJECTION (ML) INTRAVENOUS CONTINUOUS PRN
Status: ACTIVE | OUTPATIENT
Start: 2018-12-11 | End: 2018-12-11

## 2018-12-11 RX ORDER — OXYTOCIN/RINGER'S LACTATE 20/1000 ML
999 PLASTIC BAG, INJECTION (ML) INTRAVENOUS ONCE
Status: COMPLETED | OUTPATIENT
Start: 2018-12-11 | End: 2018-12-11

## 2018-12-11 RX ORDER — IBUPROFEN 600 MG/1
600 TABLET ORAL EVERY 6 HOURS PRN
Status: DISCONTINUED | OUTPATIENT
Start: 2018-12-11 | End: 2018-12-11 | Stop reason: HOSPADM

## 2018-12-11 RX ORDER — LIDOCAINE HYDROCHLORIDE AND EPINEPHRINE 15; 5 MG/ML; UG/ML
INJECTION, SOLUTION EPIDURAL AS NEEDED
Status: DISCONTINUED | OUTPATIENT
Start: 2018-12-11 | End: 2018-12-12 | Stop reason: SURG

## 2018-12-11 RX ORDER — BUTORPHANOL TARTRATE 1 MG/ML
2 INJECTION, SOLUTION INTRAMUSCULAR; INTRAVENOUS
Status: DISCONTINUED | OUTPATIENT
Start: 2018-12-11 | End: 2018-12-11 | Stop reason: HOSPADM

## 2018-12-11 RX ORDER — OXYTOCIN/RINGER'S LACTATE 20/1000 ML
125 PLASTIC BAG, INJECTION (ML) INTRAVENOUS AS NEEDED
Status: DISCONTINUED | OUTPATIENT
Start: 2018-12-11 | End: 2018-12-11 | Stop reason: HOSPADM

## 2018-12-11 RX ORDER — NALOXONE HCL 0.4 MG/ML
0.4 VIAL (ML) INJECTION
Status: DISCONTINUED | OUTPATIENT
Start: 2018-12-11 | End: 2018-12-13 | Stop reason: HOSPADM

## 2018-12-11 RX ORDER — BUTORPHANOL TARTRATE 1 MG/ML
1 INJECTION, SOLUTION INTRAMUSCULAR; INTRAVENOUS
Status: DISCONTINUED | OUTPATIENT
Start: 2018-12-11 | End: 2018-12-11 | Stop reason: HOSPADM

## 2018-12-11 RX ADMIN — IBUPROFEN 600 MG: 600 TABLET ORAL at 19:33

## 2018-12-11 RX ADMIN — LIDOCAINE HYDROCHLORIDE 3 ML: 10 INJECTION, SOLUTION INFILTRATION; PERINEURAL at 14:03

## 2018-12-11 RX ADMIN — SODIUM CHLORIDE, POTASSIUM CHLORIDE, SODIUM LACTATE AND CALCIUM CHLORIDE 125 ML/HR: 600; 310; 30; 20 INJECTION, SOLUTION INTRAVENOUS at 09:36

## 2018-12-11 RX ADMIN — OXYTOCIN 999 ML/HR: 10 INJECTION INTRAVENOUS at 15:47

## 2018-12-11 RX ADMIN — OXYTOCIN-SODIUM CHLORIDE 0.9% IV SOLN 30 UNIT/500ML 2 MILLI-UNITS/MIN: 30-0.9/5 SOLUTION at 10:06

## 2018-12-11 RX ADMIN — LIDOCAINE HYDROCHLORIDE 5 ML: 20 INJECTION, SOLUTION EPIDURAL; INFILTRATION; INTRACAUDAL; PERINEURAL at 14:19

## 2018-12-11 RX ADMIN — ROPIVACAINE HYDROCHLORIDE 12 ML/HR: 2 INJECTION, SOLUTION EPIDURAL; INFILTRATION at 14:20

## 2018-12-11 RX ADMIN — SILVER NITRATE APPLICATORS 1 APPLICATION: 25; 75 STICK TOPICAL at 15:55

## 2018-12-11 RX ADMIN — LIDOCAINE HYDROCHLORIDE 5 ML: 20 INJECTION, SOLUTION EPIDURAL; INFILTRATION; INTRACAUDAL; PERINEURAL at 14:12

## 2018-12-11 RX ADMIN — LIDOCAINE HYDROCHLORIDE AND EPINEPHRINE 3 ML: 15; 5 INJECTION, SOLUTION EPIDURAL at 14:07

## 2018-12-11 RX ADMIN — OXYTOCIN 125 ML/HR: 10 INJECTION INTRAVENOUS at 18:20

## 2018-12-11 NOTE — ANESTHESIA PROCEDURE NOTES
Labor Epidural      Patient location during procedure: OB  Start Time: 12/11/2018 2:00 PM  Stop Time: 12/11/2018 2:04 PM  Indication:at surgeon's request  Performed By  CRNA: Haresh Jefferson CRNA  Preanesthetic Checklist  Completed: patient identified, site marked, surgical consent, pre-op evaluation, timeout performed, IV checked, risks and benefits discussed and monitors and equipment checked  Additional Notes  Very shallow  Prep:  Pt Position:sitting  Sterile Tech:gloves, cap and sterile barrier  Monitoring:continuous pulse oximetry, EKG and blood pressure monitoring  Epidural Block Procedure:  Approach:midline  Guidance:landmark technique and palpation technique  Location:L4-L5  Needle Type:Tuohy  Needle Gauge:17 G  Loss of Resistance Medium: saline  Loss of Resistance: 4 (4.5)cm  Cath Depth at skin:15 cm  Paresthesia: none  Aspiration:negative  Test Dose:negative  Number of Attempts: 1  Post Assessment:  Dressing:secured with tape and occlusive dressing applied  Pt Tolerance:patient tolerated the procedure well with no apparent complications  Complications:no

## 2018-12-11 NOTE — H&P
[facility]  Oneil Kothari  : 1985  MRN: [id number]  CSN: [id number]    History and Physical    Subjective   Oneil Kothari is a 33 y.o. year old  with an Estimated Date of Delivery: 18 currently at 39w4d presenting with gestational hypertension for induction.    Prenatal care has been with Dr. Armand Sofia.  It has been complicated by gestational hypertension.    Obstetric History       T1      L1     SAB1   TAB0   Ectopic0   Molar0   Multiple0   Live Births1       # Outcome Date GA Lbr Derek/2nd Weight Sex Delivery Anes PTL Lv   3 Current            2 Term 2015 40w4d   F Vag-Spont EPI N LUIZ   1 SAB                 Past Medical History:   Diagnosis Date   • Abnormal Pap smear of cervix      Past Surgical History:   Procedure Laterality Date   • CERVICAL BIOPSY  W/ LOOP ELECTRODE EXCISION     • WISDOM TOOTH EXTRACTION         Current Facility-Administered Medications:   •  acetaminophen (TYLENOL) tablet 650 mg, 650 mg, Oral, Q4H PRN, Skip Sofia MD  •  butorphanol (STADOL) injection 1 mg, 1 mg, Intravenous, Q3H PRN, Skip Sofia MD  •  butorphanol (STADOL) injection 2 mg, 2 mg, Intravenous, Q3H PRN, Skip Sofia MD  •  lactated ringers bolus 1,000 mL, 1,000 mL, Intravenous, Once PRN, Skip Sofia MD  •  lactated ringers infusion, 125 mL/hr, Intravenous, Continuous, Skip Sofia MD  •  lidocaine PF 1% (XYLOCAINE) injection 5 mL, 5 mL, Intradermal, PRN, Skip Sofia MD  •  ondansetron (ZOFRAN) tablet 4 mg, 4 mg, Oral, Q6H PRN **OR** ondansetron ODT (ZOFRAN-ODT) disintegrating tablet 4 mg, 4 mg, Oral, Q6H PRN **OR** ondansetron (ZOFRAN) injection 4 mg, 4 mg, Intravenous, Q6H PRN, Skip Sofia MD  •  Oxytocin-Sodium Chloride (PITOCIN) 30-0.9 UT/500ML-% infusion solution, 2-30 fabiana-units/min, Intravenous, Titrated, Skip Sofia MD  •  Sod Citrate-Citric Acid (BICITRA) solution 30 mL, 30 mL, Oral, Once PRN, Ida,  "Skip SAMUELS MD  •  sodium chloride 0.9 % flush 1-10 mL, 1-10 mL, Intravenous, PRN, Skip Sofia MD  •  sodium chloride 0.9 % flush 3 mL, 3 mL, Intravenous, Q12H, Skip Sofia MD  •  terbutaline (BRETHINE) injection 0.25 mg, 0.25 mg, Subcutaneous, PRN, Skip Sofia MD    No Known Allergies  Social History    Tobacco Use      Smoking status: Never Smoker      Smokeless tobacco: Never Used    Review of Systems   Constitutional: Negative for fever.   Gastrointestinal: Negative for abdominal pain.   Genitourinary: Negative for vaginal bleeding.   Neurological: Negative for headaches.         Objective   /97 (BP Location: Right arm, Patient Position: Lying)   Pulse 85   Temp 98.2 °F (36.8 °C) (Temporal)   Resp 16   Ht 176.8 cm (69.6\")   Wt 75.3 kg (166 lb)   LMP 03/09/2018   BMI 24.09 kg/m²   General: well developed; well nourished  no acute distress   Heart: regular rate and rhythm   Lungs: breathing is unlabored   Abdomen: soft, non-tender; no masses   FHT's: reactive   Cervix: was checked (by me): 2 cm / 50 % / -3   Presentation: cephalic   Contractions: none     EFW 7#     Prenatal Labs  Lab Results   Component Value Date    HGB 11.9 (L) 09/18/2018    HEPBSAG Negative 05/03/2018    ABORH B Rh Positive 02/27/2015    ABO B 05/03/2018    RH Positive 05/03/2018    ABSCRN Negative 05/03/2018    ADI8OCP6 Non Reactive 05/03/2018    URINECX Final report 05/03/2018       Current Labs Reviewed   GBS negative       Assessment   1. IUP at 39w4d  2. Group B strep status: negative  3. Gestational hypertension     Plan   1. Admit to L&D, pitocin induction    Skip Sofia MD  12/11/2018  8:57 AM      "

## 2018-12-11 NOTE — PROGRESS NOTES
Dorothy Kothari  : 1985  MRN: [id number]  CSN: [id number]    Labor progress note    Subjective   She reports is feeling mildly painful contractions     Objective   Min/max vitals past 24 hours:  Temp  Min: 98.2 °F (36.8 °C)  Max: 98.2 °F (36.8 °C)   BP  Min: 140/100  Max: 162/89   Pulse  Min: 64  Max: 85   Resp  Min: 16  Max: 16        FHT's: reactive and category 1.  external monitors used   Cervix: was checked (by me): 2 cm / 50 % / -3   Contractions: regular     Pitocin at 6      Assessment   1. IUP at 39w4d  2. Gestational hypertension     Plan   1.   Continue with augmentation  AROM - clear fluid seen  Allow labor to continue pending maternal and fetal status  Plan discussed with family and questions answered.  Understanding verbalized.    Skip Sofia MD  2018  11:34 AM

## 2018-12-11 NOTE — L&D DELIVERY NOTE
[facility]  Vaginal Delivery Note    Delivery     Delivery: Vaginal, Spontaneous     YOB: 2018    Time of Birth: 3:44 PM      Anesthesia: Epidural     Delivering clinician: Skip Sofia    Forceps?   No   Vacuum? No    Shoulder dystocia present: No        Delivery narrative:  Progressed to C/C/+2 and pushed well to deliver a LVIF. TEMI to LOT with nuchal cord x 1, vigorous to mom's abdomen. Bloody fluid noted with delivery. Placenta spontaneous and intact with 3VC after IV Pitocin. No lacerations. Epidural anes. EBL 250mL. No complications. Sponge and needle count correct.     Infant    Findings: female  infant     Infant observations: Weight: No birth weight on file.   Length:    in  Observations/Comments:         Apgars:    @ 1 minute /       @ 5 minutes   Infant Name:      Placenta, Cord, and Fluid    Placenta delivered  Spontaneous  at   12/11/2018  3:47 PM     Cord: 3 vessels  present.   Nuchal Cord?  yes; Number of nuchal loops present:  1    Cord blood obtained: No    Cord gases obtained:  No    Cord gas results: Venous:  No results found for: PHCVEN    Arterial:  No results found for: PHCART     Repair    Episiotomy: None    Lacerations: No   Estimated Blood Loss: Est. Blood Loss (mL): 250 mL(Filed from Delivery Summary) (12/11/18 1257)           Complications  none    Disposition  Mother to Mother Baby/Postpartum  in stable condition currently.  Baby to remains with mom  in stable condition currently.      Skip Sofia MD  12/11/18  3:57 PM

## 2018-12-11 NOTE — NURSING NOTE
pt was seen in office today, sent over for induction due to elevated bp, denies leaking of fluid, vaginal bleeding, or contractions, states positive fetal movement, abdomen soft non-tender

## 2018-12-11 NOTE — ANESTHESIA PREPROCEDURE EVALUATION
Anesthesia Evaluation     Patient summary reviewed   no history of anesthetic complications:  NPO Solid Status: N/A  NPO Liquid Status: N/A           Airway   Mallampati: II  TM distance: >3 FB  Neck ROM: full  No difficulty expected  Dental - normal exam     Pulmonary - negative pulmonary ROS   Cardiovascular     (+) hypertension,       Neuro/Psych- negative ROS  GI/Hepatic/Renal/Endo    (-) hepatitis, liver disease, no renal disease, diabetes, hypothyroidism, hyperthyroidism    Musculoskeletal (-) negative ROS    Abdominal    Substance History - negative use     OB/GYN    (+) Pregnant, pregnancy induced hypertension        Other - negative ROS                       Anesthesia Plan    ASA 2     epidural     Anesthetic plan, all risks, benefits, and alternatives have been provided, discussed and informed consent has been obtained with: patient and spouse/significant other.

## 2018-12-11 NOTE — PROGRESS NOTES
No headache  Good fetal movement  Cervix soft, posterior  Patient to L&D for induction for gestational HTN

## 2018-12-12 LAB
HCT VFR BLD AUTO: 37.3 % (ref 37–47)
HGB BLD-MCNC: 12.5 G/DL (ref 12–16)

## 2018-12-12 PROCEDURE — 85018 HEMOGLOBIN: CPT | Performed by: OBSTETRICS & GYNECOLOGY

## 2018-12-12 PROCEDURE — 85014 HEMATOCRIT: CPT | Performed by: OBSTETRICS & GYNECOLOGY

## 2018-12-12 RX ADMIN — IBUPROFEN 600 MG: 200 TABLET, FILM COATED ORAL at 06:40

## 2018-12-12 RX ADMIN — Medication 2 TABLET: at 03:34

## 2018-12-12 RX ADMIN — PRENATAL VIT W/ FE FUMARATE-FA TAB 27-0.8 MG 1 TABLET: 27-0.8 TAB at 10:42

## 2018-12-12 RX ADMIN — IBUPROFEN 600 MG: 200 TABLET, FILM COATED ORAL at 15:12

## 2018-12-12 NOTE — ANESTHESIA POSTPROCEDURE EVALUATION
Patient: Oneil Kothari    Procedure Summary     Date:  12/11/18 Room / Location:      Anesthesia Start:  1358 Anesthesia Stop:  1544    Procedure:  LABOR ANALGESIA Diagnosis:      Scheduled Providers:   Provider:      Anesthesia Type:  epidural ASA Status:  2          Anesthesia Type: epidural  Last vitals  BP   134/79 (12/12/18 0905)   Temp   98.2 °F (36.8 °C) (12/12/18 0905)   Pulse   68 (12/12/18 0905)   Resp   17 (12/12/18 0905)     SpO2   99 % (12/12/18 0905)     Post Anesthesia Care and Evaluation    Patient location during evaluation: PACU  Patient participation: complete - patient participated  Level of consciousness: awake and alert  Pain score: 0  Pain management: adequate  Airway patency: patent  Anesthetic complications: No anesthetic complications  PONV Status: none  Cardiovascular status: acceptable and stable  Respiratory status: acceptable  Hydration status: acceptable  Post Neuraxial Block status: Motor and sensory function returned to baseline and No signs or symptoms of PDPH

## 2018-12-12 NOTE — PLAN OF CARE
Problem: Patient Care Overview  Goal: Plan of Care Review  Outcome: Ongoing (interventions implemented as appropriate)  Tolerating pain with po motrin. Refuses comfort products. Bonding well. Breastfeeding well. No clots or hemorrhoids. Reflexes normal. No clonus. Denies complaints of. Postpartum depression scale 0. Has lanolin and gel pads for sore nipples.   12/12/18 2085   Coping/Psychosocial   Plan of Care Reviewed With patient;spouse   Plan of Care Review   Progress improving     Goal: Individualization and Mutuality  Outcome: Ongoing (interventions implemented as appropriate)    Goal: Discharge Needs Assessment  Outcome: Ongoing (interventions implemented as appropriate)    Goal: Interprofessional Rounds/Family Conf  Outcome: Ongoing (interventions implemented as appropriate)      Problem: Postpartum (Vaginal Delivery) (Adult,Obstetrics,Pediatric)  Goal: Signs and Symptoms of Listed Potential Problems Will be Absent, Minimized or Managed (Postpartum)  Outcome: Ongoing (interventions implemented as appropriate)      Problem: Breastfeeding (Adult,Obstetrics,Pediatric)  Goal: Signs and Symptoms of Listed Potential Problems Will be Absent, Minimized or Managed (Breastfeeding)  Outcome: Ongoing (interventions implemented as appropriate)

## 2018-12-12 NOTE — PLAN OF CARE
Problem: Patient Care Overview  Goal: Plan of Care Review  Outcome: Ongoing (interventions implemented as appropriate)   12/12/18 0101   Coping/Psychosocial   Plan of Care Reviewed With patient   Plan of Care Review   Progress improving   OTHER   Outcome Summary VSS, voiding, ambulatory, [id number]-ML small rubra with no clots      Goal: Individualization and Mutuality  Outcome: Ongoing (interventions implemented as appropriate)    Goal: Discharge Needs Assessment  Outcome: Ongoing (interventions implemented as appropriate)    Goal: Interprofessional Rounds/Family Conf  Outcome: Ongoing (interventions implemented as appropriate)      Problem: Postpartum (Vaginal Delivery) (Adult,Obstetrics,Pediatric)  Goal: Signs and Symptoms of Listed Potential Problems Will be Absent, Minimized or Managed (Postpartum)  Outcome: Ongoing (interventions implemented as appropriate)

## 2018-12-12 NOTE — LACTATION NOTE
Infant Name:   Gestation: 39   Birth weight: 6-2.1 (2780 g)  Day of life:0  Weight Loss: na  24 hour Summary of Feeds: na Voids:  Stools:  Assistive devices (shields, shells, etc):none  Significant Maternal history: , difficulty latching with first child  Maternal Concerns:  Latch/comfort  Maternal Goal:   Mother's Medications: PNV  Breastpump for home: Spectra    Reviewed initial breastfeeding packet. Offered support and interventions for obtaining a deeper latch. Mother independent and infant rooting. After multiple attempts infant latched to each breast and sucked consistently with deep deniz dropping sucks. Mother reports pinching and right nipple pinched and blistered after feeding. Discussed interventions for sore nipples and encouraged milk or lanolin to nipple and reviewed latching techniques. Offered support and encouragement. Spouse present and supportive.     Instructed mom our lactation team is here for continued support throughout their breastfeeding journey. Our team has encouraged mom to call with any questions or concerns that may arise after discharge.

## 2018-12-12 NOTE — PROGRESS NOTES
"[facility]  Vaginal Delivery Progress Note    Subjective   Postpartum Day 1: Vaginal Delivery    The patient feels well.  Her pain is well controlled with nonsteroidal anti-inflammatory drugs.   She is ambulating well.  Patient describes her bleeding as thin lochia.    Breastfeeding: without difficulty.    Objective     Vital Signs Range for the last 24 hours  Temperature: Temp:  [98.2 °F (36.8 °C)-98.7 °F (37.1 °C)] 98.4 °F (36.9 °C)   Temp Source: Temp src: Temporal   BP: BP: (112-178)/() 153/84   Pulse: Heart Rate:  [] 60   Respirations: Resp:  [16-20] 18   SPO2: SpO2:  [96 %-98 %] 98 %   O2 Amount (l/min):     O2 Devices Device (Oxygen Therapy): room air   Weight: Weight:  [74.4 kg (164 lb)-75.3 kg (166 lb)] 75.3 kg (166 lb)     Admit Height:  Height: 176.8 cm (69.6\")      Physical Exam:  General:  no acute distresss.  Abdomen: abdomen is soft without significant tenderness, masses, organomegaly or guarding. Fundus: appropriate, firm, non tender  Extremities: normal, atraumatic, no cyanosis, and trace edema.       Lab results reviewed:  Yes   Rubella:  No results found for: RUBELLAIGGIN Nurse Transcribed from prenatal record --  No components found for: EXTRUBELQUAL  Rh Status:    RH type   Date Value Ref Range Status   12/11/2018 Positive  Final     Immunizations:   Immunization History   Administered Date(s) Administered   • Tdap 10/02/2018     Lab Results (last 24 hours)     Procedure Component Value Units Date/Time    Hemoglobin & Hematocrit, Blood [[id number]]  (Normal) Collected:  12/12/18 0639    Specimen:  Blood Updated:  12/12/18 0706     Hemoglobin 12.5 g/dL      Hematocrit 37.3 %     CBC & Differential [[id number]] Collected:  12/11/18 0941    Specimen:  Blood Updated:  12/11/18 0955    Narrative:       The following orders were created for panel order CBC & Differential.  Procedure                               Abnormality         Status                     ---------                         "       -----------         ------                     CBC Auto Differential[[id number]]        Normal              Final result                 Please view results for these tests on the individual orders.    CBC Auto Differential [[id number]]  (Normal) Collected:  12/11/18 0941    Specimen:  Blood Updated:  12/11/18 0955     WBC 7.92 10*3/mm3      RBC 4.44 10*6/mm3      Hemoglobin 13.7 g/dL      Hematocrit 39.3 %      MCV 88.5 fL      MCH 30.9 pg      MCHC 34.9 g/dL      RDW 13.5 %      RDW-SD 43.5 fl      MPV 11.0 fL      Platelets 203 10*3/mm3      Neutrophil % 72.4 %      Lymphocyte % 18.2 %      Monocyte % 6.9 %      Eosinophil % 1.3 %      Basophil % 0.8 %      Immature Grans % 0.4 %      Neutrophils, Absolute 5.74 10*3/mm3      Lymphocytes, Absolute 1.44 10*3/mm3      Monocytes, Absolute 0.55 10*3/mm3      Eosinophils, Absolute 0.10 10*3/mm3      Basophils, Absolute 0.06 10*3/mm3      Immature Grans, Absolute 0.03 10*3/mm3      nRBC 0.0 /100 WBC           Assessment/Plan       Gestational hypertension      Oneil Kohtari is Day 1  post-partum  Vaginal, Spontaneous    .      Plan:  Continue current care.      Violet Paige DO  12/12/2018  7:36 AM

## 2018-12-12 NOTE — LACTATION NOTE
Infant Name: Padma  Gestation: 39   Birth weight: 6-2.1 (2780 g)  Day of life: 1  Weight Loss: 0.2%  24 hour Summary of Feeds: 3BF Voids: none documented  Stools: none documented  Assistive devices (shields, shells, etc):none  Significant Maternal history: , difficulty latching with first child  Maternal Concerns:  Latch/comfort, infant sleepy  Maternal Goal: exclusive breastfeeding  Mother's Medications: PNV  Breastpump for home: Spectra    Mother reports difficulty overnight with getting infant to alert for feedings.  Discussed ways to arouse infant and encouraged hand expression and finger feeding of drops as much as possible when latching is a struggle.  Discussed different feeding positions to attempt.  Infant fed around 0800, mother to call for assistance with next feeding.      Instructed mom our lactation team is here for continued support throughout their breastfeeding journey. Our team has encouraged mom to call with any questions or concerns that may arise after discharge.    1110: Called to room to assist with feeding.  Observed mother independently latch infant in cross cradle position.  C/o nipple pain with latch.  Infant's latch broken and position slightly readjusted to allow for more chin lift position. Encouraged mother to shape breast to allow for as deep of a latch as possible.  Demonstrated shaping breast and latching infant deeply.  Reports pain improved with deeper latch.  Jaw dropping sucks and swallows noted. Discussed frequent feedings, waking techniques, hand expression, and to call for assistance if needed.  Verbalizes understanding.

## 2018-12-13 VITALS
HEIGHT: 70 IN | WEIGHT: 166 LBS | SYSTOLIC BLOOD PRESSURE: 146 MMHG | HEART RATE: 75 BPM | OXYGEN SATURATION: 100 % | TEMPERATURE: 98.9 F | BODY MASS INDEX: 23.77 KG/M2 | DIASTOLIC BLOOD PRESSURE: 86 MMHG | RESPIRATION RATE: 20 BRPM

## 2018-12-13 RX ADMIN — DOCUSATE SODIUM 100 MG: 100 CAPSULE, LIQUID FILLED ORAL at 09:53

## 2018-12-13 RX ADMIN — IBUPROFEN 600 MG: 200 TABLET, FILM COATED ORAL at 09:53

## 2018-12-13 RX ADMIN — PRENATAL VIT W/ FE FUMARATE-FA TAB 27-0.8 MG 1 TABLET: 27-0.8 TAB at 09:53

## 2018-12-13 NOTE — LACTATION NOTE
"Infant Name: Padma  Gestation: 39   Birth weight: 6-2.1 (2780 g)  Day of life: 2  Weight Loss: 2.81%  24 hour Summary of Feeds: 8 BFs Voids: 3   Stools: 3  Assistive devices (shields, shells, etc):none  Significant Maternal history: , difficulty latching with first child  Maternal Concerns:  Latch/comfort  Maternal Goal: exclusive breastfeeding  Mother's Medications: PNV  Breastpump for home: Spectra    Mother reports infant is waking and breastfeeding every 2 to 3 hours. She states infant has not had to be waken to nurse, she is waking on her own and eager for feedings. Bilateral nipples tender. Slight bruising and cracking noted to right nipple. Mother states nipple damage happened early on but has not worsened since. Observed mother independently latch infant. She c/o pain with latch. Seal broken, repositioned, and assisted with deep latching. Mother states, \"it feels the same as it has been, it's uncomfortable.\" Mother unable to describe what she felt with latch. Assessed for tongue tie before latching to second breast, no tongue tie noted and infant able to flange top lip easily. Mother latched infant to right breast reporting latch feeling \"the same.\" Infant's latch appeared deep, both lips flanged, with deep jaw dropping sucks observed. Mother declined assistance to re-latch. Positive encouragement provided as infant's weight loss is only -2.81%, feeding often, and voiding/stooling adequately. Discussed signs of milk, nipple care, maternal rest/nutrition/fluid intake, medications, pumping, breast massage/hand expression, engorgement, mastitis, signs of a good feeding, and outpatient lactation support. Understanding verbalized. Questions denied.    Mother and infant to be seen by [facility] Saturday, 12/15/18 at 1000.  " - - -

## 2018-12-13 NOTE — PLAN OF CARE
Problem: Patient Care Overview  Goal: Plan of Care Review  Outcome: Ongoing (interventions implemented as appropriate)   12/13/18 0037   Coping/Psychosocial   Plan of Care Reviewed With patient   Plan of Care Review   Progress improving   OTHER   Outcome Summary VSS, voiding, up ad bertha, FF-UU-ML, small rubra with no clots, motrin for pain      Goal: Individualization and Mutuality  Outcome: Ongoing (interventions implemented as appropriate)    Goal: Discharge Needs Assessment  Outcome: Ongoing (interventions implemented as appropriate)    Goal: Interprofessional Rounds/Family Conf  Outcome: Ongoing (interventions implemented as appropriate)      Problem: Postpartum (Vaginal Delivery) (Adult,Obstetrics,Pediatric)  Goal: Signs and Symptoms of Listed Potential Problems Will be Absent, Minimized or Managed (Postpartum)  Outcome: Ongoing (interventions implemented as appropriate)      Problem: Breastfeeding (Adult,Obstetrics,Pediatric)  Goal: Signs and Symptoms of Listed Potential Problems Will be Absent, Minimized or Managed (Breastfeeding)  Outcome: Ongoing (interventions implemented as appropriate)

## 2018-12-13 NOTE — DISCHARGE SUMMARY
Discharge Summary     Dorothy Kothari  : 1985  MRN: [id number]  CSN: [id number]    Date of Admission: 2018   Date of Discharge:  2018   Delivering Physician: Skip Sofia        Admission Diagnosis: 1. Gestational hypertension [O13.9]   Discharge Diagnosis: 1. Pregnancy at 39w4d - delivered       Procedures: 2018  - Vaginal, Spontaneous       Hospital Course  Patient is a 33 y.o.  who at 39w4d had a vaginal birth.  Her postpartum course was without complications.  On PPD #2 she was ready for discharge.  She had normal lochia and pain well controlled with oral medications.    Infant  female  fetus weighing 2780 g (6 lb 2.1 oz)   Apgars -  8  @ 1 minute /  8  @ 5 minutes.    Discharge labs  Lab Results   Component Value Date    WBC 7.92 2018    HGB 12.5 2018    HCT 37.3 2018     2018       Discharge Medications     Discharge Medications      Continue These Medications      Instructions Start Date   PRENATAL VITAMINS PO   1 tablet, Oral             Discharge Disposition Home or Self Care   Condition on Discharge: good   Follow-up: 6 weeks with Kimberlyn Sofia MD  2018

## 2018-12-13 NOTE — PROGRESS NOTES
"[facility]  Vaginal Delivery Progress Note    Subjective   Postpartum Day 2: Vaginal Delivery    The patient feels well.  Her pain is well controlled with nonsteroidal anti-inflammatory drugs.   She is ambulating well.  Patient describes her bleeding as thin lochia.    Breastfeeding: infant latching without difficulty.    Objective     Vital Signs Range for the last 24 hours  Temperature: Temp:  [98.2 °F (36.8 °C)-98.7 °F (37.1 °C)] 98.5 °F (36.9 °C)   Temp Source: Temp src: Temporal   BP: BP: (130-149)/(67-88) 149/88   Pulse: Heart Rate:  [63-79] 79   Respirations: Resp:  [17-18] 18   SPO2: SpO2:  [99 %-100 %] 99 %   O2 Amount (l/min):     O2 Devices Device (Oxygen Therapy): room air   Weight:       Admit Height:  Height: 176.8 cm (69.6\")      Physical Exam:  General:  no acute distresss.  Abdomen: Fundus: appropriate, firm, non tender  Extremities: normal, atraumatic, no cyanosis, and trace edema.       Lab results reviewed:  Yes   Rubella:  No results found for: RUBELLAIGGIN Nurse Transcribed from prenatal record --  No components found for: EXTRUBELQUAL  Rh Status:    RH type   Date Value Ref Range Status   12/11/2018 Positive  Final     Immunizations:   Immunization History   Administered Date(s) Administered   • Tdap 10/02/2018     Lab Results (last 24 hours)     ** No results found for the last 24 hours. **          Assessment/Plan       Gestational hypertension      Oneil Kothari is Day 2  post-partum  Vaginal, Spontaneous    .      Plan:  Discharge home with standard precautions and return to clinic in 4-6 weeks.      Skip Sofia MD  12/13/2018  7:22 AM     "

## 2018-12-18 ENCOUNTER — HOSPITAL ENCOUNTER (OUTPATIENT)
Dept: LACTATION | Facility: HOSPITAL | Age: 33
Discharge: HOME | End: 2018-12-18

## 2018-12-18 NOTE — LACTATION NOTE
Mother's Name:     Oneil Kothari  Contact Number:  [phone]  G/P:  3/2                 Breastfeeding Hx:  Difficulty latching with first child  Significant Medical History: None  Maternal Breast Assessment:    Breasts extremely full, taut, engorged, tender   Infant's Name:    Padma                                                       Date of Birth:  [birth year]                                                                                                              Gestational age at Birth:  39 4/7                                              Age:        7 days                                                                                                                         Physician:        Dr. Zuniga                                                                                                                                   Reason for Visit: weight assessment / engorgement management                     Infant's Birth weight:  6-2.1 (2780g)  Previous Weight:   5-15.3 (2702g)     Wt loss:  2.8%                  Last Lactation Visit:   6-0.4 2733 g          Wt. Loss 1.7%            Today's Weight:    6-5.5  (2733g)       SURPASSED BIRTH WEIGHT     Feeding History Since Discharge/Last Lactation Appt.:  Patient reports breasts fill up within one hour of emptying. She has been pumping after BFing sessions twice daytime and twice during night. This is helping with engorgement between feeds, though she was engorged even with frequent feeding / pumping this AM. Right nipple with minimal trauma pinpoint open impairment. Mother feels this has healed some.      Past 24 Hours Voids/Stools:     Color of Stool: green/yellow seedy                                                  Right Breast:  14 mins  6-7.8 2944 g  + 66 mls      Left Breast:   7 mins  6-8.7 2966 g  +22 mls                       Total Minutes:     21         Total Weight Gain:     88     gms     Average Feeding Amount for Age: 2-3 ounces or  "60-90 mls/grams     Interventions: Mother able to latch deeply on right side (\"problem\" side) independently, even though breasts taut. No pain on right, though she has experienced pain on this side previously. Observed BFing session. Infant gulping with every suckle. She sputtered/choked toward end of feed. Lanolin, pumping kit, and cool gels given.  Pumped using hospital grade Symphony after BFing session. 100 mls pumped in 15 mins. Milk labeled and given to mother.    Education: how to manage down supply without compromising milk production, s/s mastitis, healing tips for nipple     Feeding Plan:  Offer breast at first cues. Allow Padma to finish first side.  Wake her and offer second breast, though she may not take.   Get deep latch EVERY feed. Do not continue feed if feel any \"pinching\" at all. Relatch.  Pump after feeds to comfort only.  Use ice afterward for comfort; 10 mins per session.  Use EBM and lanolin on nipple trauma, cool gels if desired.   Call Ob/Gyn for symptoms of mastitis.      Plan of Care: Interventions accomplished satisfactorily, requires no further action. Mother to follow up with Outpatient Lactation at her own discretion.      Future Appointments: Tuesday, December 18th with Dr. Zuniga     Physician: Dr. Adrianna Zuniga     Signature:  Kayla Ordaz IBCLC    Faxed to:        [facility]- Dr. Zuniga                 Date:      12/18/18         "

## 2019-01-24 ENCOUNTER — POSTPARTUM VISIT (OUTPATIENT)
Dept: OBSTETRICS AND GYNECOLOGY | Facility: CLINIC | Age: 34
End: 2019-01-24

## 2019-01-24 VITALS — BODY MASS INDEX: 21.05 KG/M2 | SYSTOLIC BLOOD PRESSURE: 100 MMHG | WEIGHT: 145 LBS | DIASTOLIC BLOOD PRESSURE: 80 MMHG

## 2019-01-24 DIAGNOSIS — Z30.011 ENCOUNTER FOR INITIAL PRESCRIPTION OF CONTRACEPTIVE PILLS: ICD-10-CM

## 2019-01-24 PROBLEM — O13.9 GESTATIONAL HYPERTENSION: Status: RESOLVED | Noted: 2018-12-11 | Resolved: 2019-01-24

## 2019-01-24 PROBLEM — Z34.90 SUPERVISION OF NORMAL PREGNANCY: Status: RESOLVED | Noted: 2018-05-03 | Resolved: 2019-01-24

## 2019-01-24 PROCEDURE — 0503F POSTPARTUM CARE VISIT: CPT | Performed by: OBSTETRICS & GYNECOLOGY

## 2019-01-24 NOTE — PROGRESS NOTES
Oneil Kothari is here for a postpartum visit after a vaginal delivery 6 weeks ago.  She has no signs of postpartum depression today.  She has not had a period since her delivery and is breast-feeding her infant without difficulty.  Her last Pap smear was 2 years and normal by her report.  She had a LEEP in 2007 with normal Pap smears since then.  She would like OCPs for contraception.    /80   Wt 65.8 kg (145 lb)   LMP 03/09/2018   Breastfeeding? Yes   BMI 21.05 kg/m²    In general pleasant female no acute distress  Neck no thyromegaly  Breasts without erythema tenderness or masses  Abdomen soft and nontender  A Pap smear was not performed.     Assessment: Normal postpartum exam.    We have discussed current Pap smear screening guidelines. I have given her a prescription for a birth control pill and we have discussed common side effects and adverse events. Oneil will return in 1 year or sooner if needed.

## 2019-02-14 ENCOUNTER — TELEPHONE (OUTPATIENT)
Dept: OTHER | Facility: HOSPITAL | Age: 34
End: 2019-02-14

## 2019-02-14 NOTE — TELEPHONE ENCOUNTER
"Infant:  Padma    Mailed Oneil note of encouragement for continued BFing and thanking her for agreeing to participate in the upcoming mothers' group, [employer], modeling her baby wearing wrap. The topic will be 'Do You Kangaroo?\" covering Kangaroo Mother Care, bonding, breastfeeding, and how wearing your baby facilitates same. Oneil had agreed at last group meeting to do this.   "

## 2019-10-31 ENCOUNTER — INITIAL PRENATAL (OUTPATIENT)
Dept: OBSTETRICS AND GYNECOLOGY | Facility: CLINIC | Age: 34
End: 2019-10-31

## 2019-10-31 ENCOUNTER — PROCEDURE VISIT (OUTPATIENT)
Dept: OBSTETRICS AND GYNECOLOGY | Facility: CLINIC | Age: 34
End: 2019-10-31

## 2019-10-31 VITALS — SYSTOLIC BLOOD PRESSURE: 102 MMHG | WEIGHT: 129 LBS | DIASTOLIC BLOOD PRESSURE: 64 MMHG | BODY MASS INDEX: 18.72 KG/M2

## 2019-10-31 DIAGNOSIS — Z34.81 ENCOUNTER FOR SUPERVISION OF OTHER NORMAL PREGNANCY IN FIRST TRIMESTER: Primary | ICD-10-CM

## 2019-10-31 DIAGNOSIS — Z12.4 SCREENING FOR CERVICAL CANCER: ICD-10-CM

## 2019-10-31 DIAGNOSIS — O36.80X0 ENCOUNTER TO DETERMINE FETAL VIABILITY OF PREGNANCY, SINGLE OR UNSPECIFIED FETUS: Primary | ICD-10-CM

## 2019-10-31 PROCEDURE — 76801 OB US < 14 WKS SINGLE FETUS: CPT | Performed by: OBSTETRICS & GYNECOLOGY

## 2019-10-31 PROCEDURE — 87624 HPV HI-RISK TYP POOLED RSLT: CPT | Performed by: OBSTETRICS & GYNECOLOGY

## 2019-10-31 PROCEDURE — 0501F PRENATAL FLOW SHEET: CPT | Performed by: OBSTETRICS & GYNECOLOGY

## 2019-10-31 PROCEDURE — 87625 HPV TYPES 16 & 18 ONLY: CPT | Performed by: OBSTETRICS & GYNECOLOGY

## 2019-10-31 PROCEDURE — G0123 SCREEN CERV/VAG THIN LAYER: HCPCS | Performed by: OBSTETRICS & GYNECOLOGY

## 2019-10-31 NOTE — PROGRESS NOTES
New OB, US today shows 7 weeks gestation, EDC 6/14/20  Prior uncomplicated pregnancy and vaginal delivery x 2  Having nausea, fatigue, but no headaches. OTC medications and Reglan have not helped.  New OB labs today, has received flu vaccine  Discussed normal prenatal routines  Questions answered

## 2019-11-01 LAB
GEN CATEG CVX/VAG CYTO-IMP: NORMAL
HPV I/H RISK 4 DNA CVX QL PROBE+SIG AMP: DETECTED
HPV16 DNA SPEC QL NAA+PROBE: NOT DETECTED
HPV18+45 E6+E7 MRNA CVX QL NAA+PROBE: NOT DETECTED
LAB AP CASE REPORT: NORMAL
LAB AP GYN ADDITIONAL INFORMATION: NORMAL
LAB AP GYN OTHER FINDINGS: NORMAL
PATH INTERP SPEC-IMP: NORMAL
STAT OF ADQ CVX/VAG CYTO-IMP: NORMAL

## 2019-11-05 LAB
ABO GROUP BLD: NORMAL
BACTERIA UR CULT: NO GROWTH
BACTERIA UR CULT: NORMAL
BASOPHILS # BLD AUTO: 0 X10E3/UL (ref 0–0.2)
BASOPHILS NFR BLD AUTO: 1 %
BLD GP AB SCN SERPL QL: NEGATIVE
C TRACH RRNA SPEC QL NAA+PROBE: NEGATIVE
DRUGS UR: NORMAL
EOSINOPHIL # BLD AUTO: 0.1 X10E3/UL (ref 0–0.4)
EOSINOPHIL NFR BLD AUTO: 1 %
ERYTHROCYTE [DISTWIDTH] IN BLOOD BY AUTOMATED COUNT: 14.2 % (ref 12.3–15.4)
HBV SURFACE AG SERPL QL IA: NEGATIVE
HCT VFR BLD AUTO: 40.6 % (ref 34–46.6)
HGB BLD-MCNC: 13.4 G/DL (ref 11.1–15.9)
HIV 1+2 AB+HIV1 P24 AG SERPL QL IA: NON REACTIVE
IMM GRANULOCYTES # BLD AUTO: 0 X10E3/UL (ref 0–0.1)
IMM GRANULOCYTES NFR BLD AUTO: 0 %
LYMPHOCYTES # BLD AUTO: 1.3 X10E3/UL (ref 0.7–3.1)
LYMPHOCYTES NFR BLD AUTO: 20 %
MCH RBC QN AUTO: 30 PG (ref 26.6–33)
MCHC RBC AUTO-ENTMCNC: 33 G/DL (ref 31.5–35.7)
MCV RBC AUTO: 91 FL (ref 79–97)
MONOCYTES # BLD AUTO: 0.4 X10E3/UL (ref 0.1–0.9)
MONOCYTES NFR BLD AUTO: 7 %
N GONORRHOEA RRNA SPEC QL NAA+PROBE: NEGATIVE
NEUTROPHILS # BLD AUTO: 4.7 X10E3/UL (ref 1.4–7)
NEUTROPHILS NFR BLD AUTO: 71 %
PLATELET # BLD AUTO: 246 X10E3/UL (ref 150–450)
RBC # BLD AUTO: 4.46 X10E6/UL (ref 3.77–5.28)
RH BLD: POSITIVE
RPR SER QL: NON REACTIVE
RUBV IGG SERPL IA-ACNC: 4.79 INDEX
WBC # BLD AUTO: 6.6 X10E3/UL (ref 3.4–10.8)

## 2019-11-22 ENCOUNTER — ROUTINE PRENATAL (OUTPATIENT)
Dept: OBSTETRICS AND GYNECOLOGY | Facility: CLINIC | Age: 34
End: 2019-11-22

## 2019-11-22 VITALS — WEIGHT: 134 LBS | SYSTOLIC BLOOD PRESSURE: 124 MMHG | DIASTOLIC BLOOD PRESSURE: 74 MMHG | BODY MASS INDEX: 19.45 KG/M2

## 2019-11-22 DIAGNOSIS — Z34.81 ENCOUNTER FOR SUPERVISION OF OTHER NORMAL PREGNANCY IN FIRST TRIMESTER: Primary | ICD-10-CM

## 2019-11-22 PROBLEM — Z34.90 SUPERVISION OF NORMAL PREGNANCY: Status: ACTIVE | Noted: 2019-11-22

## 2019-11-22 PROCEDURE — 0502F SUBSEQUENT PRENATAL CARE: CPT | Performed by: OBSTETRICS & GYNECOLOGY

## 2019-11-22 NOTE — PROGRESS NOTES
Still has nausea, no emesis  May be interested in Bonjesta or Diclegis  Reviewed normal prenatal labs  Will consider ffDNA but declines for now

## 2019-12-20 ENCOUNTER — ROUTINE PRENATAL (OUTPATIENT)
Dept: OBSTETRICS AND GYNECOLOGY | Facility: CLINIC | Age: 34
End: 2019-12-20

## 2019-12-20 VITALS — SYSTOLIC BLOOD PRESSURE: 124 MMHG | BODY MASS INDEX: 20.32 KG/M2 | DIASTOLIC BLOOD PRESSURE: 68 MMHG | WEIGHT: 140 LBS

## 2019-12-20 DIAGNOSIS — Z34.82 ENCOUNTER FOR SUPERVISION OF OTHER NORMAL PREGNANCY IN SECOND TRIMESTER: Primary | ICD-10-CM

## 2019-12-20 LAB
GLUCOSE UR STRIP-MCNC: NEGATIVE MG/DL
PROT UR STRIP-MCNC: NEGATIVE MG/DL

## 2019-12-20 PROCEDURE — 0502F SUBSEQUENT PRENATAL CARE: CPT | Performed by: OBSTETRICS & GYNECOLOGY

## 2019-12-20 NOTE — PROGRESS NOTES
Nausea better, minimal fatigue  May be starting to feel fetal movement  Declines Down's Syndrome screening  Discussed Magnesium for leg cramps if needed

## 2020-01-17 ENCOUNTER — ROUTINE PRENATAL (OUTPATIENT)
Dept: OBSTETRICS AND GYNECOLOGY | Facility: CLINIC | Age: 35
End: 2020-01-17

## 2020-01-17 VITALS — DIASTOLIC BLOOD PRESSURE: 64 MMHG | WEIGHT: 142 LBS | SYSTOLIC BLOOD PRESSURE: 122 MMHG | BODY MASS INDEX: 20.61 KG/M2

## 2020-01-17 DIAGNOSIS — Z34.82 ENCOUNTER FOR SUPERVISION OF OTHER NORMAL PREGNANCY IN SECOND TRIMESTER: Primary | ICD-10-CM

## 2020-01-17 LAB
GLUCOSE UR STRIP-MCNC: NEGATIVE MG/DL
PROT UR STRIP-MCNC: NEGATIVE MG/DL

## 2020-01-17 PROCEDURE — 0502F SUBSEQUENT PRENATAL CARE: CPT | Performed by: OBSTETRICS & GYNECOLOGY

## 2020-02-14 ENCOUNTER — ROUTINE PRENATAL (OUTPATIENT)
Dept: OBSTETRICS AND GYNECOLOGY | Facility: CLINIC | Age: 35
End: 2020-02-14

## 2020-02-14 VITALS — SYSTOLIC BLOOD PRESSURE: 122 MMHG | WEIGHT: 148 LBS | BODY MASS INDEX: 21.48 KG/M2 | DIASTOLIC BLOOD PRESSURE: 64 MMHG

## 2020-02-14 DIAGNOSIS — Z34.82 ENCOUNTER FOR SUPERVISION OF OTHER NORMAL PREGNANCY IN SECOND TRIMESTER: Primary | ICD-10-CM

## 2020-02-14 LAB
GLUCOSE UR STRIP-MCNC: NEGATIVE MG/DL
PROT UR STRIP-MCNC: NEGATIVE MG/DL

## 2020-02-14 PROCEDURE — 0502F SUBSEQUENT PRENATAL CARE: CPT | Performed by: OBSTETRICS & GYNECOLOGY

## 2020-03-10 ENCOUNTER — ROUTINE PRENATAL (OUTPATIENT)
Dept: OBSTETRICS AND GYNECOLOGY | Facility: CLINIC | Age: 35
End: 2020-03-10

## 2020-03-10 VITALS — WEIGHT: 151 LBS | SYSTOLIC BLOOD PRESSURE: 100 MMHG | BODY MASS INDEX: 21.92 KG/M2 | DIASTOLIC BLOOD PRESSURE: 70 MMHG

## 2020-03-10 DIAGNOSIS — Z34.82 PRENATAL CARE, SUBSEQUENT PREGNANCY, SECOND TRIMESTER: Primary | ICD-10-CM

## 2020-03-10 PROCEDURE — 0502F SUBSEQUENT PRENATAL CARE: CPT | Performed by: OBSTETRICS & GYNECOLOGY

## 2020-03-10 NOTE — PROGRESS NOTES
Good fetal movement  Glucola and Hgb today, Rh positive  Discussed [place] Kitchen contractions

## 2020-03-11 LAB
GLUCOSE 1H P 50 G GLC PO SERPL-MCNC: 97 MG/DL (ref 65–179)
HGB BLD-MCNC: 11.3 G/DL (ref 12–15.9)

## 2020-03-24 ENCOUNTER — ROUTINE PRENATAL (OUTPATIENT)
Dept: OBSTETRICS AND GYNECOLOGY | Facility: CLINIC | Age: 35
End: 2020-03-24

## 2020-03-24 VITALS — SYSTOLIC BLOOD PRESSURE: 112 MMHG | BODY MASS INDEX: 22.21 KG/M2 | WEIGHT: 153 LBS | DIASTOLIC BLOOD PRESSURE: 70 MMHG

## 2020-03-24 DIAGNOSIS — Z34.83 ENCOUNTER FOR SUPERVISION OF OTHER NORMAL PREGNANCY IN THIRD TRIMESTER: Primary | ICD-10-CM

## 2020-03-24 LAB
GLUCOSE UR STRIP-MCNC: NEGATIVE MG/DL
PROT UR STRIP-MCNC: NEGATIVE MG/DL

## 2020-03-24 PROCEDURE — 0502F SUBSEQUENT PRENATAL CARE: CPT | Performed by: OBSTETRICS & GYNECOLOGY

## 2020-04-10 ENCOUNTER — ROUTINE PRENATAL (OUTPATIENT)
Dept: OBSTETRICS AND GYNECOLOGY | Facility: CLINIC | Age: 35
End: 2020-04-10

## 2020-04-10 VITALS — WEIGHT: 156 LBS | BODY MASS INDEX: 22.64 KG/M2 | DIASTOLIC BLOOD PRESSURE: 70 MMHG | SYSTOLIC BLOOD PRESSURE: 110 MMHG

## 2020-04-10 DIAGNOSIS — Z34.83 ENCOUNTER FOR SUPERVISION OF OTHER NORMAL PREGNANCY IN THIRD TRIMESTER: Primary | ICD-10-CM

## 2020-04-10 LAB
GLUCOSE UR STRIP-MCNC: NEGATIVE MG/DL
PROT UR STRIP-MCNC: NEGATIVE MG/DL

## 2020-04-10 PROCEDURE — 90715 TDAP VACCINE 7 YRS/> IM: CPT | Performed by: OBSTETRICS & GYNECOLOGY

## 2020-04-10 PROCEDURE — 90472 IMMUNIZATION ADMIN EACH ADD: CPT | Performed by: OBSTETRICS & GYNECOLOGY

## 2020-04-10 PROCEDURE — 0502F SUBSEQUENT PRENATAL CARE: CPT | Performed by: OBSTETRICS & GYNECOLOGY

## 2020-04-10 PROCEDURE — 90471 IMMUNIZATION ADMIN: CPT | Performed by: OBSTETRICS & GYNECOLOGY

## 2020-04-24 ENCOUNTER — ROUTINE PRENATAL (OUTPATIENT)
Dept: OBSTETRICS AND GYNECOLOGY | Facility: CLINIC | Age: 35
End: 2020-04-24

## 2020-04-24 VITALS — DIASTOLIC BLOOD PRESSURE: 78 MMHG | SYSTOLIC BLOOD PRESSURE: 110 MMHG | WEIGHT: 159 LBS | BODY MASS INDEX: 23.08 KG/M2

## 2020-04-24 DIAGNOSIS — Z34.83 ENCOUNTER FOR SUPERVISION OF OTHER NORMAL PREGNANCY IN THIRD TRIMESTER: Primary | ICD-10-CM

## 2020-04-24 PROCEDURE — 0502F SUBSEQUENT PRENATAL CARE: CPT | Performed by: OBSTETRICS & GYNECOLOGY

## 2020-05-07 ENCOUNTER — TELEPHONE (OUTPATIENT)
Dept: OBSTETRICS AND GYNECOLOGY | Facility: CLINIC | Age: 35
End: 2020-05-07

## 2020-05-07 NOTE — TELEPHONE ENCOUNTER
Pt is 34w4d - calls / leaves message to check on what her job duties should be at t his stage in pregnancy.  Unsuccessful attempt to return call - no answer-left voice message to call.  Pt has OV tomorrow with provider.

## 2020-05-08 ENCOUNTER — ROUTINE PRENATAL (OUTPATIENT)
Dept: OBSTETRICS AND GYNECOLOGY | Facility: CLINIC | Age: 35
End: 2020-05-08

## 2020-05-08 VITALS — WEIGHT: 160 LBS | BODY MASS INDEX: 23.22 KG/M2 | SYSTOLIC BLOOD PRESSURE: 128 MMHG | DIASTOLIC BLOOD PRESSURE: 82 MMHG

## 2020-05-08 DIAGNOSIS — Z34.83 ENCOUNTER FOR SUPERVISION OF OTHER NORMAL PREGNANCY IN THIRD TRIMESTER: Primary | ICD-10-CM

## 2020-05-08 LAB
GLUCOSE UR STRIP-MCNC: NEGATIVE MG/DL
PROT UR STRIP-MCNC: NEGATIVE MG/DL

## 2020-05-08 PROCEDURE — 0502F SUBSEQUENT PRENATAL CARE: CPT | Performed by: OBSTETRICS & GYNECOLOGY

## 2020-05-08 NOTE — PROGRESS NOTES
Good fetal movement  Took stairs to appointment today, repeat BP normal  Labor precautions  GBS and cx's next visit

## 2020-05-19 ENCOUNTER — ROUTINE PRENATAL (OUTPATIENT)
Dept: OBSTETRICS AND GYNECOLOGY | Facility: CLINIC | Age: 35
End: 2020-05-19

## 2020-05-19 VITALS — DIASTOLIC BLOOD PRESSURE: 80 MMHG | WEIGHT: 159 LBS | SYSTOLIC BLOOD PRESSURE: 126 MMHG | BODY MASS INDEX: 23.08 KG/M2

## 2020-05-19 DIAGNOSIS — Z34.83 PRENATAL CARE, SUBSEQUENT PREGNANCY, THIRD TRIMESTER: Primary | ICD-10-CM

## 2020-05-19 LAB
GLUCOSE UR STRIP-MCNC: NEGATIVE MG/DL
PROT UR STRIP-MCNC: ABNORMAL MG/DL

## 2020-05-19 PROCEDURE — 0502F SUBSEQUENT PRENATAL CARE: CPT | Performed by: OBSTETRICS & GYNECOLOGY

## 2020-05-19 NOTE — PROGRESS NOTES
Good fetal movement  Has had a few contractions  Cervix moderate, posterior  GBS and cx's done  Labor instructions

## 2020-05-26 ENCOUNTER — ROUTINE PRENATAL (OUTPATIENT)
Dept: OBSTETRICS AND GYNECOLOGY | Facility: CLINIC | Age: 35
End: 2020-05-26

## 2020-05-26 VITALS — WEIGHT: 165 LBS | BODY MASS INDEX: 23.95 KG/M2 | SYSTOLIC BLOOD PRESSURE: 124 MMHG | DIASTOLIC BLOOD PRESSURE: 90 MMHG

## 2020-05-26 DIAGNOSIS — Z34.83 ENCOUNTER FOR SUPERVISION OF OTHER NORMAL PREGNANCY IN THIRD TRIMESTER: Primary | ICD-10-CM

## 2020-05-26 LAB
GLUCOSE UR STRIP-MCNC: NEGATIVE MG/DL
PROT UR STRIP-MCNC: ABNORMAL MG/DL

## 2020-05-26 PROCEDURE — 0502F SUBSEQUENT PRENATAL CARE: CPT | Performed by: OBSTETRICS & GYNECOLOGY

## 2020-06-02 ENCOUNTER — ROUTINE PRENATAL (OUTPATIENT)
Dept: OBSTETRICS AND GYNECOLOGY | Facility: CLINIC | Age: 35
End: 2020-06-02

## 2020-06-02 VITALS — WEIGHT: 167 LBS | DIASTOLIC BLOOD PRESSURE: 82 MMHG | SYSTOLIC BLOOD PRESSURE: 132 MMHG | BODY MASS INDEX: 24.24 KG/M2

## 2020-06-02 DIAGNOSIS — Z34.83 ENCOUNTER FOR SUPERVISION OF OTHER NORMAL PREGNANCY IN THIRD TRIMESTER: Primary | ICD-10-CM

## 2020-06-02 LAB
GLUCOSE UR STRIP-MCNC: NEGATIVE MG/DL
PROT UR STRIP-MCNC: NEGATIVE MG/DL

## 2020-06-02 PROCEDURE — 0502F SUBSEQUENT PRENATAL CARE: CPT | Performed by: OBSTETRICS & GYNECOLOGY

## 2020-06-09 ENCOUNTER — ROUTINE PRENATAL (OUTPATIENT)
Dept: OBSTETRICS AND GYNECOLOGY | Facility: CLINIC | Age: 35
End: 2020-06-09

## 2020-06-09 VITALS — SYSTOLIC BLOOD PRESSURE: 110 MMHG | BODY MASS INDEX: 23.08 KG/M2 | DIASTOLIC BLOOD PRESSURE: 70 MMHG | WEIGHT: 159 LBS

## 2020-06-09 DIAGNOSIS — Z34.83 ENCOUNTER FOR SUPERVISION OF OTHER NORMAL PREGNANCY IN THIRD TRIMESTER: Primary | ICD-10-CM

## 2020-06-09 LAB
GLUCOSE UR STRIP-MCNC: NEGATIVE MG/DL
PROT UR STRIP-MCNC: NEGATIVE MG/DL

## 2020-06-09 PROCEDURE — 0502F SUBSEQUENT PRENATAL CARE: CPT | Performed by: OBSTETRICS & GYNECOLOGY

## 2020-06-15 ENCOUNTER — HOSPITAL ENCOUNTER (OUTPATIENT)
Facility: HOSPITAL | Age: 35
End: 2020-06-15
Attending: OBSTETRICS & GYNECOLOGY | Admitting: OBSTETRICS & GYNECOLOGY

## 2020-06-15 ENCOUNTER — HOSPITAL ENCOUNTER (INPATIENT)
Facility: HOSPITAL | Age: 35
LOS: 2 days | Discharge: HOME | End: 2020-06-17
Attending: OBSTETRICS & GYNECOLOGY | Admitting: OBSTETRICS & GYNECOLOGY

## 2020-06-15 DIAGNOSIS — Z3A.40 40 WEEKS GESTATION OF PREGNANCY: Primary | ICD-10-CM

## 2020-06-15 PROBLEM — Z34.90 PREGNANT: Status: ACTIVE | Noted: 2020-06-15

## 2020-06-15 LAB
ABO GROUP BLD: NORMAL
BLD GP AB SCN SERPL QL: NEGATIVE
DEPRECATED RDW RBC AUTO: 41.6 FL (ref 37–54)
ERYTHROCYTE [DISTWIDTH] IN BLOOD BY AUTOMATED COUNT: 13 % (ref 12.3–15.4)
HCT VFR BLD AUTO: 37.2 % (ref 34–46.6)
HGB BLD-MCNC: 12.4 G/DL (ref 12–15.9)
MCH RBC QN AUTO: 29.5 PG (ref 26.6–33)
MCHC RBC AUTO-ENTMCNC: 33.3 G/DL (ref 31.5–35.7)
MCV RBC AUTO: 88.4 FL (ref 79–97)
PLATELET # BLD AUTO: 202 10*3/MM3 (ref 140–450)
PMV BLD AUTO: 11.5 FL (ref 6–12)
RBC # BLD AUTO: 4.21 10*6/MM3 (ref 3.77–5.28)
RH BLD: POSITIVE
T&S EXPIRATION DATE: NORMAL
WBC NRBC COR # BLD: 7.59 10*3/MM3 (ref 3.4–10.8)

## 2020-06-15 PROCEDURE — 86901 BLOOD TYPING SEROLOGIC RH(D): CPT | Performed by: OBSTETRICS & GYNECOLOGY

## 2020-06-15 PROCEDURE — 86900 BLOOD TYPING SEROLOGIC ABO: CPT | Performed by: OBSTETRICS & GYNECOLOGY

## 2020-06-15 PROCEDURE — 86850 RBC ANTIBODY SCREEN: CPT | Performed by: OBSTETRICS & GYNECOLOGY

## 2020-06-15 PROCEDURE — 59400 OBSTETRICAL CARE: CPT | Performed by: OBSTETRICS & GYNECOLOGY

## 2020-06-15 PROCEDURE — 25010000002 BUTORPHANOL PER 1 MG

## 2020-06-15 PROCEDURE — 0KQM0ZZ REPAIR PERINEUM MUSCLE, OPEN APPROACH: ICD-10-PCS | Performed by: OBSTETRICS & GYNECOLOGY

## 2020-06-15 PROCEDURE — 85027 COMPLETE CBC AUTOMATED: CPT | Performed by: OBSTETRICS & GYNECOLOGY

## 2020-06-15 RX ORDER — PROMETHAZINE HYDROCHLORIDE 25 MG/ML
12.5 INJECTION, SOLUTION INTRAMUSCULAR; INTRAVENOUS EVERY 6 HOURS PRN
Status: DISCONTINUED | OUTPATIENT
Start: 2020-06-15 | End: 2020-06-17 | Stop reason: HOSPADM

## 2020-06-15 RX ORDER — ONDANSETRON 4 MG/1
4 TABLET, FILM COATED ORAL EVERY 8 HOURS PRN
Status: DISCONTINUED | OUTPATIENT
Start: 2020-06-15 | End: 2020-06-17 | Stop reason: HOSPADM

## 2020-06-15 RX ORDER — METHYLERGONOVINE MALEATE 0.2 MG/ML
200 INJECTION INTRAVENOUS ONCE AS NEEDED
Status: DISCONTINUED | OUTPATIENT
Start: 2020-06-15 | End: 2020-06-15 | Stop reason: HOSPADM

## 2020-06-15 RX ORDER — LIDOCAINE HYDROCHLORIDE 20 MG/ML
INJECTION, SOLUTION INFILTRATION; PERINEURAL
Status: COMPLETED
Start: 2020-06-15 | End: 2020-06-15

## 2020-06-15 RX ORDER — OXYTOCIN/0.9 % SODIUM CHLORIDE 30/500 ML
125 PLASTIC BAG, INJECTION (ML) INTRAVENOUS CONTINUOUS PRN
Status: DISCONTINUED | OUTPATIENT
Start: 2020-06-15 | End: 2020-06-15 | Stop reason: HOSPADM

## 2020-06-15 RX ORDER — DOCUSATE SODIUM 100 MG/1
100 CAPSULE, LIQUID FILLED ORAL 2 TIMES DAILY
Status: DISCONTINUED | OUTPATIENT
Start: 2020-06-15 | End: 2020-06-17 | Stop reason: HOSPADM

## 2020-06-15 RX ORDER — LIDOCAINE HYDROCHLORIDE 20 MG/ML
20 INJECTION, SOLUTION INFILTRATION; PERINEURAL ONCE
Status: COMPLETED | OUTPATIENT
Start: 2020-06-15 | End: 2020-06-15

## 2020-06-15 RX ORDER — SODIUM CHLORIDE 0.9 % (FLUSH) 0.9 %
1-10 SYRINGE (ML) INJECTION AS NEEDED
Status: DISCONTINUED | OUTPATIENT
Start: 2020-06-15 | End: 2020-06-15 | Stop reason: HOSPADM

## 2020-06-15 RX ORDER — HYDROCODONE BITARTRATE AND ACETAMINOPHEN 5; 325 MG/1; MG/1
1 TABLET ORAL EVERY 4 HOURS PRN
Status: DISCONTINUED | OUTPATIENT
Start: 2020-06-15 | End: 2020-06-17 | Stop reason: HOSPADM

## 2020-06-15 RX ORDER — METHYLERGONOVINE MALEATE 0.2 MG/ML
200 INJECTION INTRAVENOUS ONCE AS NEEDED
Status: DISCONTINUED | OUTPATIENT
Start: 2020-06-15 | End: 2020-06-17 | Stop reason: HOSPADM

## 2020-06-15 RX ORDER — SODIUM CHLORIDE 0.9 % (FLUSH) 0.9 %
3 SYRINGE (ML) INJECTION EVERY 12 HOURS SCHEDULED
Status: DISCONTINUED | OUTPATIENT
Start: 2020-06-15 | End: 2020-06-15 | Stop reason: HOSPADM

## 2020-06-15 RX ORDER — HYDROXYZINE HYDROCHLORIDE 25 MG/1
50 TABLET, FILM COATED ORAL NIGHTLY PRN
Status: DISCONTINUED | OUTPATIENT
Start: 2020-06-15 | End: 2020-06-17 | Stop reason: HOSPADM

## 2020-06-15 RX ORDER — ONDANSETRON 4 MG/1
4 TABLET, FILM COATED ORAL EVERY 6 HOURS PRN
Status: DISCONTINUED | OUTPATIENT
Start: 2020-06-15 | End: 2020-06-15 | Stop reason: HOSPADM

## 2020-06-15 RX ORDER — BUTORPHANOL TARTRATE 1 MG/ML
1 INJECTION, SOLUTION INTRAMUSCULAR; INTRAVENOUS
Status: DISCONTINUED | OUTPATIENT
Start: 2020-06-15 | End: 2020-06-15 | Stop reason: HOSPADM

## 2020-06-15 RX ORDER — MISOPROSTOL 200 UG/1
800 TABLET ORAL AS NEEDED
Status: DISCONTINUED | OUTPATIENT
Start: 2020-06-15 | End: 2020-06-15 | Stop reason: HOSPADM

## 2020-06-15 RX ORDER — HYDROCORTISONE 25 MG/G
1 CREAM TOPICAL AS NEEDED
Status: DISCONTINUED | OUTPATIENT
Start: 2020-06-15 | End: 2020-06-17 | Stop reason: HOSPADM

## 2020-06-15 RX ORDER — BUTORPHANOL TARTRATE 1 MG/ML
2 INJECTION, SOLUTION INTRAMUSCULAR; INTRAVENOUS
Status: DISCONTINUED | OUTPATIENT
Start: 2020-06-15 | End: 2020-06-15 | Stop reason: HOSPADM

## 2020-06-15 RX ORDER — CARBOPROST TROMETHAMINE 250 UG/ML
250 INJECTION, SOLUTION INTRAMUSCULAR AS NEEDED
Status: DISCONTINUED | OUTPATIENT
Start: 2020-06-15 | End: 2020-06-15 | Stop reason: HOSPADM

## 2020-06-15 RX ORDER — ONDANSETRON 2 MG/ML
4 INJECTION INTRAMUSCULAR; INTRAVENOUS EVERY 6 HOURS PRN
Status: DISCONTINUED | OUTPATIENT
Start: 2020-06-15 | End: 2020-06-17 | Stop reason: HOSPADM

## 2020-06-15 RX ORDER — CALCIUM CARBONATE 200(500)MG
2 TABLET,CHEWABLE ORAL 3 TIMES DAILY PRN
Status: DISCONTINUED | OUTPATIENT
Start: 2020-06-15 | End: 2020-06-17 | Stop reason: HOSPADM

## 2020-06-15 RX ORDER — OXYTOCIN/0.9 % SODIUM CHLORIDE 30/500 ML
PLASTIC BAG, INJECTION (ML) INTRAVENOUS
Status: COMPLETED
Start: 2020-06-15 | End: 2020-06-15

## 2020-06-15 RX ORDER — TERBUTALINE SULFATE 1 MG/ML
0.25 INJECTION, SOLUTION SUBCUTANEOUS AS NEEDED
Status: DISCONTINUED | OUTPATIENT
Start: 2020-06-15 | End: 2020-06-15 | Stop reason: HOSPADM

## 2020-06-15 RX ORDER — LIDOCAINE HYDROCHLORIDE 10 MG/ML
5 INJECTION, SOLUTION EPIDURAL; INFILTRATION; INTRACAUDAL; PERINEURAL AS NEEDED
Status: DISCONTINUED | OUTPATIENT
Start: 2020-06-15 | End: 2020-06-15 | Stop reason: HOSPADM

## 2020-06-15 RX ORDER — MISOPROSTOL 200 UG/1
600 TABLET ORAL ONCE AS NEEDED
Status: DISCONTINUED | OUTPATIENT
Start: 2020-06-15 | End: 2020-06-17 | Stop reason: HOSPADM

## 2020-06-15 RX ORDER — MORPHINE SULFATE 2 MG/ML
1 INJECTION, SOLUTION INTRAMUSCULAR; INTRAVENOUS EVERY 4 HOURS PRN
Status: DISCONTINUED | OUTPATIENT
Start: 2020-06-15 | End: 2020-06-17 | Stop reason: HOSPADM

## 2020-06-15 RX ORDER — OXYTOCIN/0.9 % SODIUM CHLORIDE 30/500 ML
999 PLASTIC BAG, INJECTION (ML) INTRAVENOUS ONCE
Status: COMPLETED | OUTPATIENT
Start: 2020-06-15 | End: 2020-06-15

## 2020-06-15 RX ORDER — BISACODYL 10 MG
10 SUPPOSITORY, RECTAL RECTAL DAILY PRN
Status: DISCONTINUED | OUTPATIENT
Start: 2020-06-16 | End: 2020-06-17 | Stop reason: HOSPADM

## 2020-06-15 RX ORDER — PROMETHAZINE HYDROCHLORIDE 25 MG/1
25 TABLET ORAL EVERY 6 HOURS PRN
Status: DISCONTINUED | OUTPATIENT
Start: 2020-06-15 | End: 2020-06-17 | Stop reason: HOSPADM

## 2020-06-15 RX ORDER — PROMETHAZINE HYDROCHLORIDE 12.5 MG/1
12.5 SUPPOSITORY RECTAL EVERY 6 HOURS PRN
Status: DISCONTINUED | OUTPATIENT
Start: 2020-06-15 | End: 2020-06-17 | Stop reason: HOSPADM

## 2020-06-15 RX ORDER — SODIUM CHLORIDE, SODIUM LACTATE, POTASSIUM CHLORIDE, CALCIUM CHLORIDE 600; 310; 30; 20 MG/100ML; MG/100ML; MG/100ML; MG/100ML
125 INJECTION, SOLUTION INTRAVENOUS CONTINUOUS
Status: DISCONTINUED | OUTPATIENT
Start: 2020-06-15 | End: 2020-06-15

## 2020-06-15 RX ORDER — OXYTOCIN/0.9 % SODIUM CHLORIDE 30/500 ML
250 PLASTIC BAG, INJECTION (ML) INTRAVENOUS CONTINUOUS
Status: ACTIVE | OUTPATIENT
Start: 2020-06-15 | End: 2020-06-15

## 2020-06-15 RX ORDER — PRENATAL VIT/IRON FUM/FOLIC AC 27MG-0.8MG
1 TABLET ORAL DAILY
Status: DISCONTINUED | OUTPATIENT
Start: 2020-06-15 | End: 2020-06-17 | Stop reason: HOSPADM

## 2020-06-15 RX ORDER — IBUPROFEN 800 MG/1
800 TABLET ORAL EVERY 8 HOURS PRN
Status: DISCONTINUED | OUTPATIENT
Start: 2020-06-15 | End: 2020-06-15 | Stop reason: HOSPADM

## 2020-06-15 RX ORDER — SODIUM CHLORIDE 0.9 % (FLUSH) 0.9 %
1-10 SYRINGE (ML) INJECTION AS NEEDED
Status: DISCONTINUED | OUTPATIENT
Start: 2020-06-15 | End: 2020-06-17 | Stop reason: HOSPADM

## 2020-06-15 RX ORDER — NALOXONE HCL 0.4 MG/ML
0.4 VIAL (ML) INJECTION
Status: DISCONTINUED | OUTPATIENT
Start: 2020-06-15 | End: 2020-06-17 | Stop reason: HOSPADM

## 2020-06-15 RX ORDER — IBUPROFEN 600 MG/1
600 TABLET ORAL EVERY 8 HOURS PRN
Status: DISCONTINUED | OUTPATIENT
Start: 2020-06-15 | End: 2020-06-17 | Stop reason: HOSPADM

## 2020-06-15 RX ORDER — ONDANSETRON 2 MG/ML
4 INJECTION INTRAMUSCULAR; INTRAVENOUS EVERY 6 HOURS PRN
Status: DISCONTINUED | OUTPATIENT
Start: 2020-06-15 | End: 2020-06-15 | Stop reason: HOSPADM

## 2020-06-15 RX ORDER — BUTORPHANOL TARTRATE 1 MG/ML
INJECTION, SOLUTION INTRAMUSCULAR; INTRAVENOUS
Status: COMPLETED
Start: 2020-06-15 | End: 2020-06-15

## 2020-06-15 RX ORDER — ACETAMINOPHEN 325 MG/1
650 TABLET ORAL EVERY 4 HOURS PRN
Status: DISCONTINUED | OUTPATIENT
Start: 2020-06-15 | End: 2020-06-15 | Stop reason: HOSPADM

## 2020-06-15 RX ADMIN — HYDROCODONE BITARTRATE AND ACETAMINOPHEN 1 TABLET: 5; 325 TABLET ORAL at 08:47

## 2020-06-15 RX ADMIN — HYDROCODONE BITARTRATE AND ACETAMINOPHEN 1 TABLET: 5; 325 TABLET ORAL at 18:37

## 2020-06-15 RX ADMIN — OXYTOCIN-SODIUM CHLORIDE 0.9% IV SOLN 30 UNIT/500ML 250 ML/HR: 30-0.9/5 SOLUTION at 03:04

## 2020-06-15 RX ADMIN — Medication 999 ML/HR: at 02:35

## 2020-06-15 RX ADMIN — PRENATAL VIT W/ FE FUMARATE-FA TAB 27-0.8 MG 1 TABLET: 27-0.8 TAB at 08:47

## 2020-06-15 RX ADMIN — IBUPROFEN 600 MG: 600 TABLET, FILM COATED ORAL at 22:18

## 2020-06-15 RX ADMIN — BUTORPHANOL TARTRATE 1 MG: 1 INJECTION, SOLUTION INTRAMUSCULAR; INTRAVENOUS at 02:48

## 2020-06-15 RX ADMIN — DOCUSATE SODIUM 100 MG: 100 CAPSULE ORAL at 21:03

## 2020-06-15 RX ADMIN — HYDROCODONE BITARTRATE AND ACETAMINOPHEN 1 TABLET: 5; 325 TABLET ORAL at 22:18

## 2020-06-15 RX ADMIN — HYDROCODONE BITARTRATE AND ACETAMINOPHEN 1 TABLET: 5; 325 TABLET ORAL at 14:35

## 2020-06-15 RX ADMIN — LIDOCAINE HYDROCHLORIDE 20 ML: 20 INJECTION, SOLUTION INFILTRATION; PERINEURAL at 02:45

## 2020-06-15 RX ADMIN — OXYTOCIN-SODIUM CHLORIDE 0.9% IV SOLN 30 UNIT/500ML 999 ML/HR: 30-0.9/5 SOLUTION at 02:35

## 2020-06-15 RX ADMIN — IBUPROFEN 600 MG: 600 TABLET, FILM COATED ORAL at 14:35

## 2020-06-15 RX ADMIN — IBUPROFEN 800 MG: 800 TABLET ORAL at 03:51

## 2020-06-15 RX ADMIN — DOCUSATE SODIUM 100 MG: 100 CAPSULE ORAL at 08:47

## 2020-06-15 RX ADMIN — BENZOCAINE AND LEVOMENTHOL 1 APPLICATION: 200; 5 SPRAY TOPICAL at 05:48

## 2020-06-15 NOTE — H&P
[facility]  Oneil Kothari  : 1985  MRN: [id number]  CSN: [id number]    History and Physical    Subjective   Oneil Kothari is a 34 y.o. year old  with an Estimated Date of Delivery: 20 currently at 40w1d presenting with precipitous delivery at home.    Prenatal care has been with Dr. Armand Sofia.  It has been benign.    OB History    Para Term  AB Living   4 2 2 0 1 2   SAB TAB Ectopic Molar Multiple Live Births   1 0 0 0 0 2      # Outcome Date GA Lbr Derek/2nd Weight Sex Delivery Anes PTL Lv   4 Current            3 Term 18 39w4d  2780 g (6 lb 2.1 oz) F Vag-Spont EPI N LUIZ      Name: JUANITA KOTHARI      Apgar1: 8  Apgar5: 8   2 Term 2015 40w4d   F Vag-Spont EPI N LUIZ   1 SAB              Past Medical History:   Diagnosis Date   • Abnormal Pap smear of cervix    • Gestational hypertension      Past Surgical History:   Procedure Laterality Date   • CERVICAL BIOPSY  W/ LOOP ELECTRODE EXCISION     • WISDOM TOOTH EXTRACTION         Current Facility-Administered Medications:   •  acetaminophen (TYLENOL) tablet 650 mg, 650 mg, Oral, Q4H PRN, Skip Sofia MD  •  butorphanol (STADOL) injection 1 mg, 1 mg, Intravenous, Q3H PRN, Skip Sofia MD, 1 mg at 06/15/20 0248  •  butorphanol (STADOL) injection 1 mg, 1 mg, Intravenous, Q3H PRN, Skip Sofia MD  •  butorphanol (STADOL) injection 2 mg, 2 mg, Intravenous, Q3H PRN, Skip Sofia MD  •  carboprost (HEMABATE) injection 250 mcg, 250 mcg, Intramuscular, PRN, Skip Sofia MD  •  ibuprofen (ADVIL,MOTRIN) tablet 800 mg, 800 mg, Oral, Q8H PRN, Skip Sofia MD  •  lactated ringers infusion, 125 mL/hr, Intravenous, Continuous, Skip Sofia MD  •  lidocaine PF 1% (XYLOCAINE) injection 5 mL, 5 mL, Intradermal, PRN, Skip Sofia MD  •  methylergonovine (METHERGINE) injection 200 mcg, 200 mcg, Intramuscular, Once PRN, Skip Sofia MD  •  miSOPROStol (CYTOTEC) tablet  800 mcg, 800 mcg, Rectal, PRN, Skip Sofia MD  •  ondansetron (ZOFRAN) tablet 4 mg, 4 mg, Oral, Q6H PRN **OR** ondansetron (ZOFRAN) injection 4 mg, 4 mg, Intravenous, Q6H PRN, Skip Sofia MD  •  [COMPLETED] oxytocin (PITOCIN) 30 units in 0.9% sodium chloride 500 mL (premix), 999 mL/hr, Intravenous, Once, Stopped at 06/15/20 0304 **FOLLOWED BY** oxytocin (PITOCIN) 30 units in 0.9% sodium chloride 500 mL (premix), 250 mL/hr, Intravenous, Continuous, Last Rate: 250 mL/hr at 06/15/20 0304, 250 mL/hr at 06/15/20 0304 **FOLLOWED BY** oxytocin (PITOCIN) 30 units in 0.9% sodium chloride 500 mL (premix), 125 mL/hr, Intravenous, Continuous PRN, Skip Sofia MD  •  sodium chloride 0.9 % flush 1-10 mL, 1-10 mL, Intravenous, PRN, Skip Sofia MD  •  sodium chloride 0.9 % flush 3 mL, 3 mL, Intravenous, Q12H, Skip Sofia MD  •  terbutaline (BRETHINE) injection 0.25 mg, 0.25 mg, Subcutaneous, PRN, Skip Sofia MD    No Known Allergies  Social History    Tobacco Use      Smoking status: Never Smoker      Smokeless tobacco: Never Used    Review of Systems   Constitutional: Negative for fever.   Respiratory: Negative for cough.    Gastrointestinal: Positive for abdominal pain.   Genitourinary: Positive for vaginal bleeding.   Hematological: Does not bruise/bleed easily.         Objective   There were no vitals taken for this visit.  General: well developed; well nourished  no acute distress   Heart: regular rate and rhythm   Lungs: breathing is unlabored   Abdomen: soft, non-tender; no masses   FHT's:    Cervix: Delivered   Presentation:    Contractions:  EFW by Leopold's:  EFW by recent u/s:            Prenatal Labs  Lab Results   Component Value Date    HGB 12.4 06/15/2020    HEPBSAG Negative 10/31/2019    ABORH B Rh Positive 02/27/2015    ABO B 10/31/2019    RH Positive 10/31/2019    ABSCRN Negative 10/31/2019    [id number] Non Reactive 10/31/2019    URINECX Final report 10/31/2019        Current Labs Reviewed   No data reviewed       Assessment   1. IUP at 40w1d  2. Fetus reassuring  3. Group B strep status: negative  4. Precipitous Delivery     Plan   1. Admit to L&D, see delivery note    Skip Sofia MD  6/15/2020  03:10

## 2020-06-15 NOTE — L&D DELIVERY NOTE
[facility]  Vaginal Delivery Note    Delivery     Delivery: Vaginal    Date of Birth:  [birth year]    Time of Birth:  Gestational Age 01:37  40w1d     Anesthesia: None    Delivering clinician: Kimberlyn   Forceps?   No   Vacuum? No    Shoulder dystocia present: No        Delivery narrative:  Precipitous delivery at home of a LVIM. I was present upon patient's arrival to L&D with vigorous infant covered with towel on mom's chest. Cord clamped and cut. Placenta spontaneous and intact with 3VC followed by IV Pitocin. 2nd degree perineal laceration repaired with 2-0 Vicryl Rapide using 1% Lidocaine for anes. EBL 500mL. No complications. Sponge and needle count correct.           Placenta, Cord, and Fluid    Placenta delivered  Intact       Cord: 3VC present.   Nuchal Cord?  yes; Number of nuchal loops present:  1 per patient     Repair    Episiotomy: none    No    Lacerations: Yes  Laceration Information  Laceration Repaired?   Perineal: Yes 2nd degree   Periurethral: No Right     Suture used for repair: 2-0 Vicryl Rapide   Estimated Blood Loss:  500 mL           Complications  Precipitous delivery    Disposition  Mother to Mother Baby/Postpartum  in stable condition currently.  Baby to remains with mom  in stable condition currently.      Skip Sofia MD  06/15/20  03:04

## 2020-06-15 NOTE — LACTATION NOTE
"Mother's Name: Oneil  Phone #: [phone]  Infant Name: Jovany Coombs  : [birth year]  Gestation: 40w1d  Day of life:  Birth weight:  8-13.1 (4000g)  Discharge weight:  Weight Loss:   24 hour Summary of Feeds: 2  Voids: 1  Stools: 0  Assistive devices (shields, shells, etc): None  Significant Maternal history: , Gestational HTN, precipitous delivery at home  Maternal Concerns: None    Maternal Goal: BF as long as possible  Mother's Medications: PNV  Breastpump for home: Spectra  Ped follow up appt:     Patient is an experienced breastfeeder, she  her 2 previous children. She states infant has latched and  well 4 times since delivery. States, \"He has got some learning to do but I know what to do.\" Patient declines initial breastfeeding packet and breastfeeding book. Questions, concerns denied at this time. Encouragement provided. Lactation support offered.     Instructed mom our lactation team is here for continued support throughout their breastfeeding journey. Our team has encouraged mom to call with any questions or concerns that may arise after discharge.  "

## 2020-06-16 ENCOUNTER — EPISODE CHANGES (OUTPATIENT)
Dept: CASE MANAGEMENT | Facility: OTHER | Age: 35
End: 2020-06-16

## 2020-06-16 LAB
HCT VFR BLD AUTO: 26.4 % (ref 34–46.6)
HGB BLD-MCNC: 8.8 G/DL (ref 12–15.9)

## 2020-06-16 PROCEDURE — 85014 HEMATOCRIT: CPT | Performed by: OBSTETRICS & GYNECOLOGY

## 2020-06-16 PROCEDURE — 85018 HEMOGLOBIN: CPT | Performed by: OBSTETRICS & GYNECOLOGY

## 2020-06-16 RX ADMIN — HYDROCODONE BITARTRATE AND ACETAMINOPHEN 1 TABLET: 5; 325 TABLET ORAL at 17:10

## 2020-06-16 RX ADMIN — DOCUSATE SODIUM 100 MG: 100 CAPSULE ORAL at 21:05

## 2020-06-16 RX ADMIN — HYDROCODONE BITARTRATE AND ACETAMINOPHEN 1 TABLET: 5; 325 TABLET ORAL at 21:04

## 2020-06-16 RX ADMIN — IBUPROFEN 600 MG: 600 TABLET, FILM COATED ORAL at 17:10

## 2020-06-16 RX ADMIN — IBUPROFEN 600 MG: 600 TABLET, FILM COATED ORAL at 06:22

## 2020-06-16 RX ADMIN — DOCUSATE SODIUM 100 MG: 100 CAPSULE ORAL at 09:27

## 2020-06-16 RX ADMIN — HYDROCODONE BITARTRATE AND ACETAMINOPHEN 1 TABLET: 5; 325 TABLET ORAL at 12:27

## 2020-06-16 RX ADMIN — HYDROCODONE BITARTRATE AND ACETAMINOPHEN 1 TABLET: 5; 325 TABLET ORAL at 03:43

## 2020-06-16 RX ADMIN — PRENATAL VIT W/ FE FUMARATE-FA TAB 27-0.8 MG 1 TABLET: 27-0.8 TAB at 09:27

## 2020-06-16 NOTE — PLAN OF CARE
Problem: Patient Care Overview  Goal: Plan of Care Review  Outcome: Ongoing (interventions implemented as appropriate)  Flowsheets (Taken 6/16/2020 2140)  Progress: improving  Plan of Care Reviewed With: patient; spouse  Outcome Summary: VSS. FF/ML/U2. scant lochia. PRN pain meds given for pain control. Breastfeeding independently. Bonding well with infant

## 2020-06-16 NOTE — PLAN OF CARE
Problem: Patient Care Overview  Goal: Plan of Care Review  Outcome: Ongoing (interventions implemented as appropriate)  Flowsheets (Taken 6/16/2020 1611)  Progress: improving  Plan of Care Reviewed With: patient  Outcome Summary: VSS WNL, voiding, po meds working to control pain.  breastfeeding well.

## 2020-06-16 NOTE — PROGRESS NOTES
"[facility]  Vaginal Delivery Progress Note    Subjective   Postpartum Day 1: Vaginal Delivery    The patient feels well.  Her pain is well controlled with ibuprofen (OTC) and Norco.   She is ambulating well.  Patient describes her bleeding as moderate lochia.    Breastfeeding: infant latching without difficulty.    Objective     Vital Signs Range for the last 24 hours  Temperature: Temp:  [97.2 °F (36.2 °C)-98.1 °F (36.7 °C)] 97.2 °F (36.2 °C)   Temp Source: Temp src: Temporal   BP: BP: (131-141)/(87-97) 131/87   Pulse: Heart Rate:  [67-93] 67   Respirations: Resp:  [16-18] 18   SPO2: SpO2:  [98 %-99 %] 99 %   O2 Amount (l/min):     O2 Devices Device (Oxygen Therapy): room air   Weight:       Admit Height:  Height: 172.7 cm (68\")      Physical Exam:  General:  no acute distresss.  Lungs:  breathing is unlabored  Abdomen: Fundus: appropriate, firm, non tender  Extremities: normal, atraumatic, no cyanosis, and Negative edema.     Lab results reviewed:  Yes   Rubella:  No results found for: RUBELLAIGGIN Nurse Transcribed from prenatal record --  No components found for: EXTRUBELQUAL  Rh Status:    RH type   Date Value Ref Range Status   06/15/2020 Positive  Final     Immunizations:   Immunization History   Administered Date(s) Administered   • Tdap 10/02/2018, 04/10/2020     Lab Results (last 24 hours)     Procedure Component Value Units Date/Time    Hemoglobin & Hematocrit, Blood [[id number]]  (Abnormal) Collected:  06/16/20 0616    Specimen:  Blood Updated:  06/16/20 0700     Hemoglobin 8.8 g/dL      Hematocrit 26.4 %           Assessment/Plan       Precipitous delivery    Pregnant      Oneil Kothari is Day 1  post-partum  This patient has no babies on file. This patient has no babies on file..      Plan:  Continue current care.      Mary Jane Luque MD  6/16/2020  07:14  "

## 2020-06-16 NOTE — LACTATION NOTE
Mother's Name: Oneil  Phone #: [phone]  Infant Name: Jovany Coombs  : [birth year]  Gestation: 40w1d  Day of life:1  Birth weight:  8-13.1 (4000g)  Discharge weight:  Weight Loss: -2.4%  24 hour Summary of Feeds: 10BF Voids: 5 Stools: 7  Assistive devices (shields, shells, etc): None  Significant Maternal history: , Gestational HTN, precipitous delivery at home  Maternal Concerns: None    Maternal Goal: BF as long as possible  Mother's Medications: PNV  Breastpump for home: Spectra  Ped follow up appt:     Patient states feedings are going well, other than mild tenderness. Discussed latching techniques, causes of nipple tenderness, lanolin use, suck/swallow ratio, signs of a good feeding, feeding on demand, healing sore nipples, and expected transition of milk. Offered support and encouragement.     Instructed mom our lactation team is here for continued support throughout their breastfeeding journey. Our team has encouraged mom to call with any questions or concerns that may arise after discharge.

## 2020-06-17 VITALS
HEIGHT: 68 IN | OXYGEN SATURATION: 98 % | BODY MASS INDEX: 24.25 KG/M2 | WEIGHT: 160 LBS | DIASTOLIC BLOOD PRESSURE: 89 MMHG | TEMPERATURE: 97.9 F | HEART RATE: 76 BPM | SYSTOLIC BLOOD PRESSURE: 147 MMHG | RESPIRATION RATE: 16 BRPM

## 2020-06-17 RX ORDER — HYDROCODONE BITARTRATE AND ACETAMINOPHEN 5; 325 MG/1; MG/1
1 TABLET ORAL EVERY 4 HOURS PRN
Qty: 10 TABLET | Refills: 0 | Status: SHIPPED | OUTPATIENT
Start: 2020-06-17 | End: 2020-06-25

## 2020-06-17 RX ADMIN — DOCUSATE SODIUM 100 MG: 100 CAPSULE ORAL at 08:30

## 2020-06-17 RX ADMIN — HYDROCODONE BITARTRATE AND ACETAMINOPHEN 1 TABLET: 5; 325 TABLET ORAL at 12:57

## 2020-06-17 RX ADMIN — PRENATAL VIT W/ FE FUMARATE-FA TAB 27-0.8 MG 1 TABLET: 27-0.8 TAB at 08:30

## 2020-06-17 RX ADMIN — HYDROCODONE BITARTRATE AND ACETAMINOPHEN 1 TABLET: 5; 325 TABLET ORAL at 01:34

## 2020-06-17 RX ADMIN — IBUPROFEN 600 MG: 600 TABLET, FILM COATED ORAL at 12:57

## 2020-06-17 RX ADMIN — HYDROCODONE BITARTRATE AND ACETAMINOPHEN 1 TABLET: 5; 325 TABLET ORAL at 05:52

## 2020-06-17 RX ADMIN — IBUPROFEN 600 MG: 600 TABLET, FILM COATED ORAL at 01:34

## 2020-06-17 NOTE — PLAN OF CARE
Problem: Patient Care Overview  Goal: Plan of Care Review  Outcome: Ongoing (interventions implemented as appropriate)  Flowsheets  Taken 6/16/2020 1611 by Isha Medley, RN  Progress: improving  Plan of Care Reviewed With: patient  Taken 6/17/2020 0256 by Shanta Chau, RN  Outcome Summary: VSS. Fundus and lochia WDL.  Voiding and ambulating without difficulty.  Breastfeeding and bonding well with baby.  Pain at level of tolerance with PO pain meds.

## 2020-06-17 NOTE — LACTATION NOTE
Mother's Name: Oneil  Phone #: [phone]  Infant Name: Jovany Coombs  : [birth year]  Gestation: 40w1d  Day of life:2  Birth weight:  8-13.1 (4000g)  Discharge weight: 8-5.7 (3790g)  Weight Loss: -5.25%  24 hour Summary of Feeds: 19BF Voids: 3 Stools: 3  Assistive devices (shields, shells, etc): None  Significant Maternal history: , Gestational HTN, precipitous delivery at home  Maternal Concerns: None    Maternal Goal: BF as long as possible  Mother's Medications: PNV  Breastpump for home: Spectra  Ped follow up appt:     Patient states feedings are going well and she is feeling more full in her breasts and noticing more audible swallows each feeding. Scale to bsd for pre/post weight check per patient request. Infant nursing upon entering room, but patient states he just started. Pre weight 3882g. Returned infant to left breast. Reviewed feeding plan, expected infant voids/stools, signs of a good feeding, expected infant weight loss/gain, and preventing engorgement/mastitis. Anticipate 5-15 gram gain post feed. Infant gained 18 grams after intermittent nursing for 30 minutes. Praised patient and offered outpatient lactation follow up as desired.     Instructed mom our lactation team is here for continued support throughout their breastfeeding journey. Our team has encouraged mom to call with any questions or concerns that may arise after discharge.

## 2020-06-17 NOTE — PROGRESS NOTES
"[facility]  Vaginal Delivery Progress Note    Subjective   Postpartum Day 2: Vaginal Delivery    The patient feels well.  Her pain is well controlled with nonsteroidal anti-inflammatory drugs and opioid analgesics.   She is ambulating well.  Patient describes her bleeding as thin lochia.    Breastfeeding: infant latching without difficulty.    Objective     Vital Signs Range for the last 24 hours  Temperature: Temp:  [98.5 °F (36.9 °C)] 98.5 °F (36.9 °C)   Temp Source: Temp src: Oral   BP: BP: (148)/(81) 148/81   Pulse: Heart Rate:  [82] 82   Respirations: Resp:  [16] 16   SPO2: SpO2:  [98 %] 98 %   O2 Amount (l/min):     O2 Devices Device (Oxygen Therapy): room air   Weight:       Admit Height:  Height: 172.7 cm (68\")      Physical Exam:  General:  no acute distresss.  Abdomen: abdomen is soft without significant tenderness, masses, organomegaly or guarding. Fundus: appropriate, firm, non tender  Extremities: normal, atraumatic, no cyanosis, and trace edema.     Lab results reviewed:  Yes   Rubella:  No results found for: RUBELLAIGGIN Nurse Transcribed from prenatal record --  No components found for: EXTRUBELQUAL  Rh Status:    RH type   Date Value Ref Range Status   06/15/2020 Positive  Final     Immunizations:   Immunization History   Administered Date(s) Administered   • Tdap 10/02/2018, 04/10/2020     Lab Results (last 24 hours)     ** No results found for the last 24 hours. **          Assessment/Plan       Precipitous delivery    Pregnant      Oneil Kothari is Day 2  post-partum  This patient has no babies on file. This patient has no babies on file..      Plan:  Discharge home with standard precautions and return to clinic in 4-6 weeks.      Violet Paige,   6/17/2020  08:20    "

## 2020-06-17 NOTE — DISCHARGE SUMMARY
Discharge Summary     Dorothy Kothari  : 1985  MRN: [id number]  CSN: [id number]    Date of Admission: 6/15/2020   Date of Discharge:  2020   Delivering Physician: This patient has no babies on file.       Admission Diagnosis: 1. Pregnant [Z34.90]   Discharge Diagnosis: 1. Pregnancy at 40w1d - delivered  2. Gestational hypertension       Procedures: This patient has no babies on file. - This patient has no babies on file.      Hospital Course  Patient is a 34 y.o.  who at 40w1d had a vaginal birth.  Her postpartum course was without complications.  On PPD #2 she was ready for discharge.  She had normal lochia and pain well controlled with oral medications.    Infant  This patient has no babies on file. fetus weighing This patient has no babies on file.  Apgars -  This patient has no babies on file.@ 1 minute /  This patient has no babies on file.@ 5 minutes.    Discharge labs  Lab Results   Component Value Date    WBC 7.59 06/15/2020    HGB 8.8 (L) 2020    HCT 26.4 (L) 2020     06/15/2020       Discharge Medications     Discharge Medications      New Medications      Instructions Start Date   HYDROcodone-acetaminophen 5-325 MG per tablet  Commonly known as:  NORCO   1 tablet, Oral, Every 4 Hours PRN         Continue These Medications      Instructions Start Date   PRENATAL VITAMINS PO   1 tablet, Oral             Discharge Disposition Home or Self Care   Condition on Discharge: good   Follow-up: 2 weeks with Kimberlyn for blood pressure check     Violet Paige DO  2020

## 2020-06-30 ENCOUNTER — POSTPARTUM VISIT (OUTPATIENT)
Dept: OBSTETRICS AND GYNECOLOGY | Facility: CLINIC | Age: 35
End: 2020-06-30

## 2020-06-30 ENCOUNTER — HOSPITAL ENCOUNTER (OUTPATIENT)
Dept: LACTATION | Facility: HOSPITAL | Age: 35
Discharge: HOME | End: 2020-06-30

## 2020-06-30 VITALS
HEIGHT: 68 IN | BODY MASS INDEX: 21.37 KG/M2 | SYSTOLIC BLOOD PRESSURE: 134 MMHG | DIASTOLIC BLOOD PRESSURE: 78 MMHG | WEIGHT: 141 LBS

## 2020-06-30 PROBLEM — Z34.90 PREGNANT: Status: RESOLVED | Noted: 2020-06-15 | Resolved: 2020-06-30

## 2020-06-30 PROCEDURE — 0503F POSTPARTUM CARE VISIT: CPT | Performed by: OBSTETRICS & GYNECOLOGY

## 2020-07-28 ENCOUNTER — POSTPARTUM VISIT (OUTPATIENT)
Dept: OBSTETRICS AND GYNECOLOGY | Facility: CLINIC | Age: 35
End: 2020-07-28

## 2020-07-28 VITALS
DIASTOLIC BLOOD PRESSURE: 74 MMHG | HEIGHT: 68 IN | BODY MASS INDEX: 20.76 KG/M2 | SYSTOLIC BLOOD PRESSURE: 110 MMHG | WEIGHT: 137 LBS

## 2020-07-28 PROBLEM — Z34.90 SUPERVISION OF NORMAL PREGNANCY: Status: RESOLVED | Noted: 2019-11-22 | Resolved: 2020-07-28

## 2020-07-28 PROCEDURE — 0503F POSTPARTUM CARE VISIT: CPT | Performed by: OBSTETRICS & GYNECOLOGY

## 2020-07-28 NOTE — PROGRESS NOTES
"Oneil Kothari is here for a postpartum visit after a vaginal delivery 6 weeks ago.  The depression questionnaire has been completed.  She has no signs of postpartum depression today.  She has not had a period since her delivery and is breast-feeding her infant without difficulty.  Her last Pap smear was 10/2019 and normal but HPV positive.  She has no history of cervical dysplasia.  She would like an IUD for contraception.    /74 (BP Location: Left arm, Patient Position: Sitting)   Ht 172.7 cm (68\")   Wt 62.1 kg (137 lb)   Breastfeeding Yes   BMI 20.83 kg/m²    In general pleasant female no acute distress  Neck no thyromegaly  Breasts without erythema tenderness or masses  Abdomen soft and nontender  A Pap smear was not performed.     Assessment: Normal postpartum exam.    We have discussed current Pap smear screening guidelines. We will order a Mirena IUD and contact her when it arrives to schedule placement.   "

## 2020-08-17 ENCOUNTER — PROCEDURE VISIT (OUTPATIENT)
Dept: OBSTETRICS AND GYNECOLOGY | Facility: CLINIC | Age: 35
End: 2020-08-17

## 2020-08-17 VITALS
HEIGHT: 68 IN | WEIGHT: 137 LBS | BODY MASS INDEX: 20.76 KG/M2 | SYSTOLIC BLOOD PRESSURE: 102 MMHG | DIASTOLIC BLOOD PRESSURE: 70 MMHG

## 2020-08-17 DIAGNOSIS — Z30.430 ENCOUNTER FOR INSERTION OF MIRENA IUD: Primary | ICD-10-CM

## 2020-08-17 PROBLEM — Z97.5 IUD (INTRAUTERINE DEVICE) IN PLACE: Status: ACTIVE | Noted: 2020-08-17

## 2020-08-17 LAB
B-HCG UR QL: NEGATIVE
INTERNAL NEGATIVE CONTROL: NEGATIVE
INTERNAL POSITIVE CONTROL: POSITIVE
Lab: NORMAL

## 2020-08-17 PROCEDURE — 58300 INSERT INTRAUTERINE DEVICE: CPT | Performed by: OBSTETRICS & GYNECOLOGY

## 2020-08-17 PROCEDURE — 81025 URINE PREGNANCY TEST: CPT | Performed by: OBSTETRICS & GYNECOLOGY

## 2020-08-17 NOTE — PROGRESS NOTES
"Oneil Kothari is a 34 y.o. female  is here for IUD insertion.  Her last Pap smear was 10/2019 and normal but HPV positive.    /70 (BP Location: Left arm, Patient Position: Sitting)   Ht 172.7 cm (68\")   Wt 62.1 kg (137 lb)   Breastfeeding Yes   BMI 20.83 kg/m²      A urine pregnancy test in the office today is negative.    We have discussed the IUD, common side effects, and potential adverse events like uterine perforation, infection, or expulsion.  She has signed the consent form after answering her questions.    MIRENA IUD lot number [id number] was placed today.  The cervix was visualized and a sample was obtained for gonorrhea and chlamydia testing. The cervix was then cleansed with BETADINE swabs.  The anterior lip was grasped with a single-tooth tenaculum.  The uterus sounded to 7.5 cm.  The IUD was placed at the uterine fundus using sterile technique in a standard fashion.  The applicator was removed and the strings trimmed to 3 cm length.  The tenaculum site was made hemostatic with silver nitrate sticks. She tolerated the procedure well.    Assessment: IUD placement.    Oneil Kothari will return in one month for an IUD check and repeat Pap smear.  She is given instructions to call if she has any fevers, heavy bleeding, severe pain, or nausea.     "

## 2020-08-19 LAB
C TRACH RRNA SPEC QL NAA+PROBE: NEGATIVE
N GONORRHOEA RRNA SPEC QL NAA+PROBE: NEGATIVE

## 2020-09-21 ENCOUNTER — OFFICE VISIT (OUTPATIENT)
Dept: OBSTETRICS AND GYNECOLOGY | Facility: CLINIC | Age: 35
End: 2020-09-21

## 2020-09-21 VITALS
DIASTOLIC BLOOD PRESSURE: 64 MMHG | BODY MASS INDEX: 20.61 KG/M2 | HEIGHT: 68 IN | SYSTOLIC BLOOD PRESSURE: 118 MMHG | WEIGHT: 136 LBS

## 2020-09-21 DIAGNOSIS — Z30.431 IUD CHECK UP: Primary | ICD-10-CM

## 2020-09-21 DIAGNOSIS — R87.810 CERVICAL HIGH RISK HPV (HUMAN PAPILLOMAVIRUS) TEST POSITIVE: ICD-10-CM

## 2020-09-21 PROCEDURE — 99213 OFFICE O/P EST LOW 20 MIN: CPT | Performed by: OBSTETRICS & GYNECOLOGY

## 2020-09-21 PROCEDURE — 87624 HPV HI-RISK TYP POOLED RSLT: CPT | Performed by: OBSTETRICS & GYNECOLOGY

## 2020-09-21 PROCEDURE — G0123 SCREEN CERV/VAG THIN LAYER: HCPCS | Performed by: OBSTETRICS & GYNECOLOGY

## 2020-09-21 NOTE — PROGRESS NOTES
"Oneil Kothari is a 35 y.o. female  here today for an IUD check.  She had a MIRENA IUD placed one month ago.  Since then she has had a little spotting but denies pain, discharge, or fevers.  She has not had any problems with the strings.  She is happy with her contraceptive choice.  She is due for a follow up Pap smear, last one was HPV positive.    Visit Vitals  /64 (BP Location: Right arm, Patient Position: Sitting)   Ht 172.7 cm (68\")   Wt 61.7 kg (136 lb)   Breastfeeding Yes   BMI 20.68 kg/m²      No acute distress  Abdomen is nontender  On pelvic exam the IUD strings are visible in normal location  A Pap smear was performed  Bimanual examination reveals no tenderness of the cervix or uterus    Assessment: Normal IUD check, screening for cervical cancer    Oneil Kothari will return in 1 year and otherwise will call if she has questions or problems. We will notify her when the Pap smear results are available.  "

## 2020-09-22 LAB
GEN CATEG CVX/VAG CYTO-IMP: NORMAL
HPV I/H RISK 4 DNA CVX QL PROBE+SIG AMP: NOT DETECTED
LAB AP CASE REPORT: NORMAL
LAB AP GYN ADDITIONAL INFORMATION: NORMAL
PATH INTERP SPEC-IMP: NORMAL
STAT OF ADQ CVX/VAG CYTO-IMP: NORMAL

## 2021-01-06 PROCEDURE — 87635 SARS-COV-2 COVID-19 AMP PRB: CPT | Performed by: FAMILY MEDICINE

## 2021-03-28 NOTE — PLAN OF CARE
Problem: Patient Care Overview  Goal: Plan of Care Review  Outcome: Ongoing (interventions implemented as appropriate)  Flowsheets  Taken 6/15/2020 1814  Progress: improving  Outcome Summary: VSS. FF/U1 with scant lochia. PO pain meds and comfort products working well for pain control.  Taken 6/15/2020 0835  Plan of Care Reviewed With: patient;spouse      [facility] ENCOUNTER    Basic Information  Name: Marcela Lazcano Age: 89 year old Sex: female   MRN: [id number] Encounter: 3/28/2021, 11:43 AM  PCP: Ramona Baltazar MD    Chief Complaint  Chief Complaint   Patient presents with   • Toe Swelling       History of Present Illness    Marcela Lazcano is a 89 year old female who presents to the ED today swelling, redness, and bleeding of the left great toe.  Patient suffers from chronic wounds on her left foot because by friction from her brace that she wears.  She reports that when she was having her dressing changed the other day it was noticed that her left great toe seemed a little red and swollen but otherwise was not bothering her.  When she was being bathed today it was noticed that the toe was much more swollen and red and then began to bleed spontaneously.  Bleeding stopped prior to arrival.  Patient has chronic neuropathy and basically has no feeling in her feet.  She has required amputations of some of her other toes in the past secondary to infection she reports.  She denies sustaining any trauma, denies fever, denies chills.    History Limited By: Nothing    Language Assistance Required/Utilized: No    Health Status  Allergies:  ALLERGIES:  No Known Allergies    Current Medications:  No current facility-administered medications on file prior to encounter.  atorvastatin (LIPITOR) 10 MG tablet  calcium carbonate-vitamin D (CALTRATE+D) 600-400 MG-UNIT per tablet  clopidogrel (PLAVIX) 75 MG tablet  warfarin (COUMADIN) 3 MG tablet  warfarin (COUMADIN) 5 MG tablet  digoxin (LANOXIN) 0.125 MG tablet  fluticasone-vilanterol (BREO ELLIPTA) 200-25 MCG/INH inhaler  furosemide (LASIX) 40 MG tablet  gabapentin (NEURONTIN) 600 MG tablet  levothyroxine 50 MCG tablet  metoPROLOL succinate (TOPROL-XL) 25 MG 24 hr tablet  omeprazole (PrilOSEC) 20 MG capsule  spironolactone (ALDACTONE) 25 MG tablet  diclofenac (VOLTAREN) 1 % gel  Zinc Oxide 22 %  Cream  docusate sodium (COLACE) 100 MG capsule  polyethylene glycol (MIRALAX) 17 g packet  HYDROcodone-acetaminophen (NORCO) 7.5-325 MG per tablet  simethicone (MYLICON) 80 MG chewable tablet  Multiple Vitamins-Minerals (vitamin - therapeutic multivitamins w/minerals) tablet  CARBOXYMethylcellulose (REFRESH PLUS) 0.5 % ophthalmic solution        Past Medical History    Past Medical History:   Diagnosis Date   • Atrial fibrillation (CMS/HCC)    • Diabetes mellitus (CMS/HCC)    • Essential (primary) hypertension    • High cholesterol    • Neuropathy        Past Surgical History:   Procedure Laterality Date   • Cardiac catherization     • Knee surgery     • Total shoulder arthroplasty         History reviewed. No pertinent family history.    Social History     Tobacco Use   • Smoking status: Never Smoker   • Smokeless tobacco: Never Used   Substance Use Topics   • Alcohol use: Not Currently   • Drug use: Never       Review of Systems  All systems otherwise negative, ROS reviewed as documented in chart.    Physical Exam  Body mass index is 27.08 kg/m².  Physical Exam  Vitals and nursing note reviewed.   Constitutional:       General: She is not in acute distress.     Appearance: Normal appearance. She is not ill-appearing or toxic-appearing.      Comments: Pleasant and conversational   HENT:      Head: Normocephalic and atraumatic.      Mouth/Throat:      Mouth: Mucous membranes are moist.      Pharynx: Oropharynx is clear.      Comments: Voice normal  Eyes:      General: No scleral icterus.     Extraocular Movements: Extraocular movements intact.      Conjunctiva/sclera: Conjunctivae normal.      Pupils: Pupils are equal, round, and reactive to light.   Cardiovascular:      Rate and Rhythm: Normal rate and regular rhythm.      Pulses:           Dorsalis pedis pulses are detected w/ Doppler on the left side.   Pulmonary:      Effort: Pulmonary effort is normal.      Breath sounds: Normal breath sounds.      Comments:  Speaks in full sentences  Abdominal:      General: Abdomen is flat. Bowel sounds are normal. There is no distension.      Palpations: Abdomen is soft.      Tenderness: There is no abdominal tenderness. There is no guarding or rebound.   Musculoskeletal:         General: No tenderness or deformity. Normal range of motion.      Cervical back: Normal range of motion and neck supple. No rigidity.      Right lower leg: No edema.      Left lower leg: No edema.   Feet:      Comments: Chronic deformities and amputated toes noted.  Most notable on today's exam is marked erythema with evidence of a freshly ruptured hematoma of the left great toe.  There is erythema and likely cellulitis extending from the left great toe over the dorsum of the left foot.  No crepitus, purulence.  Skin:     General: Skin is warm and dry.      Capillary Refill: Capillary refill takes less than 2 seconds.      Findings: No rash.   Neurological:      General: No focal deficit present.      Mental Status: She is alert and oriented to person, place, and time.      GCS: GCS eye subscore is 4. GCS verbal subscore is 5. GCS motor subscore is 6.      Comments: No gross deficits appreciated   Psychiatric:         Mood and Affect: Mood normal.         Behavior: Behavior normal.             Medical Decision Making  Rationale:  Multiple differential diagnoses were considered. The patient / caregivers were apprised of diagnostic / treatment options including alternate modes of care, in addition to the risks and benefits, for this medical condition. Based on this discussion the patient / caregiver agrees with this chosen diagnostic and treatment plan.    Orders:  Medications   piperacillin-tazobactam (ZOSYN) 3.375 g in sodium chloride 0.9 % 100 mL IVPB (3.375 g Intravenous New Bag 3/28/21 1415)   vancomycin 1,250 mg in sodium chloride 0.9% 250 mL IVPB (0 mg Intravenous Completed 3/28/21 1415)   sodium chloride (NORMAL SALINE) 0.9 % bolus 1,000 mL (0 mLs  Intravenous Completed 3/28/21 1408)   HYDROcodone-acetaminophen (NORCO) 5-325 MG per tablet 1 tablet (1 tablet Oral Given 3/28/21 1325)           Laboratory Results:  Results for orders placed or performed during the hospital encounter of 03/28/21   Comprehensive Metabolic Panel   Result Value Ref Range    Fasting Status      Sodium 139 135 - 145 mmol/L    Potassium 3.9 3.4 - 5.1 mmol/L    Chloride 103 98 - 107 mmol/L    Carbon Dioxide 30 21 - 32 mmol/L    Anion Gap 10 10 - 20 mmol/L    Glucose 101 (H) 65 - 99 mg/dL    BUN 16 6 - 20 mg/dL    Creatinine 0.75 0.51 - 0.95 mg/dL    Glomerular Filtration Rate 71 (L) >90 mL/min/1.73m2    BUN/ Creatinine Ratio 21 7 - 25    Calcium 8.8 8.4 - 10.2 mg/dL    Bilirubin, Total 0.6 0.2 - 1.0 mg/dL    GOT/AST 11 <=37 Units/L    GPT/ALT 15 <64 Units/L    Alkaline Phosphatase 86 45 - 117 Units/L    Albumin 3.1 (L) 3.6 - 5.1 g/dL    Protein, Total 6.8 6.4 - 8.2 g/dL    Globulin 3.7 2.0 - 4.0 g/dL    A/G Ratio 0.8 (L) 1.0 - 2.4   Prothrombin Time   Result Value Ref Range    Prothrombin Time 22.1 (H) 9.7 - 11.8 sec    INR 2.1 <=5.0   Partial Thromboplastin Time   Result Value Ref Range    PTT 39 (H) 22 - 30 sec   Lactic Acid Venous With Reflex   Result Value Ref Range    Lactate, Venous 1.7 0.0 - 2.0 mmol/L   C Reactive Protein   Result Value Ref Range    C-Reactive Protein 7.6 (H) <=1.0 mg/dL   [facility] Rate [facility]   Result Value Ref Range    RBC Sedimentation Rate 35 (H) 0 - 20 mm/hr   CBC with Automated Differential (performable only)   Result Value Ref Range    WBC 5.4 4.2 - 11.0 K/mcL    RBC 3.83 (L) 4.00 - 5.20 mil/mcL    HGB 11.4 (L) 12.0 - 15.5 g/dL    HCT 34.9 (L) 36.0 - 46.5 %    MCV 91.1 78.0 - 100.0 fl    MCH 29.8 26.0 - 34.0 pg    MCHC 32.7 32.0 - 36.5 g/dL    RDW-CV 14.6 11.0 - 15.0 %    RDW-SD 49.1 39.0 - 50.0 fL     (L) 140 - 450 K/mcL    NRBC 0 <=0 /100 WBC    Neutrophil, Percent 66 %    Lymphocytes, Percent 24 %    Mono, Percent 9 %    Eosinophils,  Percent 1 %    Basophils, Percent 0 %    Immature Granulocytes 0 %    Absolute Neutrophils 3.5 1.8 - 7.7 K/mcL    Absolute Lymphocytes 1.3 1.0 - 4.0 K/mcL    Absolute Monocytes 0.5 0.3 - 0.9 K/mcL    Absolute Eosinophils  0.1 0.0 - 0.5 K/mcL    Absolute Basophils 0.0 0.0 - 0.3 K/mcL    Absolute Immmature Granulocytes 0.0 0.0 - 0.2 K/mcL   Rapid SARS-CoV-2 by PCR    Specimen: Nasopharyngeal; Swab   Result Value Ref Range    Rapid SARS-COV-2 by PCR Not Detected Not Detected / Detected / Presumptive Positive / Inhibitors present    Isolation Guidelines      Procedural Comment         Radiology Results:  XR FOOT MIN 3 VIEWS LEFT   Final Result      1.  No radiographic evidence of osteomyelitis.   2.  Significant hallux valgus deformity of the left metatarsophalangeal   joint.   3.  Expected postsurgical changes of left 5th toe amputation.   4.  Other incidental findings, as detailed above.         Electronically Signed by: FRANCISCO GARDNER M.D.    Signed on: 3/28/2021 12:39 PM              MDM:     ED Course  Vitals:    03/28/21 1145 03/28/21 1215 03/28/21 1315 03/28/21 1413   BP: 111/89 102/55 121/55 118/60   BP Location:       Patient Position:       Pulse: 81 79 72 85   Resp: 19 19 20 16   Temp:    99.1 °F (37.3 °C)   TempSrc:       SpO2: 95% 96% 97% 96%   Weight:       Height:           ED Course as of Mar 28 1434   Sun Mar 28, 2021   1357 In summary, this is a pleasant 89-year-old female coming to the hospital today with complaint of a suspected left great toe infection.  No evidence of sepsis or septic shock at this time.  Given her history of infections requiring amputations will treat aggressively with IV vancomycin and Zosyn.  Patient's care was discussed with Dr. Vinson who accepts patient for admission and requests infectious disease consultation.    [JL]   1310 [employer] sent to Dr. Whitney    [JL]      ED Course User Index  [JL] Hammad Back MD       Procedures  Procedures      Impression and  Plan  Diagnosis:  1. Cellulitis of great toe of left foot        Condition:  STABLE    Disposition:  Admit 3/28/2021  1:25 PM  Telemetry Bed?: No  Admitting Physician: MARY WATTS [[id number]]  Is this a telephone or verbal order?: This is a telephone order from the admitting physician      Counseled:  Patient, Regarding diagnosis, Regarding diagnostic results, Regarding treatment, and Patient understood    Critical Care:  n/a            Hammad Back MD  03/28/21 2594

## 2021-05-21 ENCOUNTER — OFFICE VISIT (OUTPATIENT)
Dept: INTERNAL MEDICINE | Facility: CLINIC | Age: 36
End: 2021-05-21

## 2021-05-21 VITALS
DIASTOLIC BLOOD PRESSURE: 80 MMHG | WEIGHT: 126.1 LBS | HEART RATE: 94 BPM | OXYGEN SATURATION: 99 % | BODY MASS INDEX: 19.11 KG/M2 | SYSTOLIC BLOOD PRESSURE: 115 MMHG | HEIGHT: 68 IN | TEMPERATURE: 98.2 F | RESPIRATION RATE: 15 BRPM

## 2021-05-21 DIAGNOSIS — M25.552 LEFT HIP PAIN: ICD-10-CM

## 2021-05-21 DIAGNOSIS — J06.9 VIRAL URI: Primary | ICD-10-CM

## 2021-05-21 PROCEDURE — 99213 OFFICE O/P EST LOW 20 MIN: CPT | Performed by: INTERNAL MEDICINE

## 2021-05-21 PROCEDURE — 87635 SARS-COV-2 COVID-19 AMP PRB: CPT | Performed by: NURSE PRACTITIONER

## 2021-05-21 RX ORDER — AZITHROMYCIN 250 MG/1
TABLET, FILM COATED ORAL
Qty: 6 TABLET | Refills: 0 | Status: SHIPPED | OUTPATIENT
Start: 2021-05-21 | End: 2021-06-28

## 2021-05-21 RX ORDER — METHYLPREDNISOLONE 4 MG/1
TABLET ORAL
Qty: 21 EACH | Refills: 0 | Status: SHIPPED | OUTPATIENT
Start: 2021-05-21 | End: 2021-06-28

## 2021-05-21 NOTE — PROGRESS NOTES
Chief Complaint   Patient presents with   • Establish Care   • Pain     Left Hip/Leg    • Sinus Problem     Virus been going around home for the last 2 weeks- no fever-sore throat       History:  Oneil Kothari is a 35 y.o. female who presents today for evaluation of the above problems.    She presents today to establish care.  She is having 2 separate issues.    For the last 5 days she has had a head cold with associated headache, mucus and runny nose and achiness.  There has been a rare cough and has needed to clear her throat.  She also feels tired/fatigued today and call then to work today.  She denies any fevers chills or night sweats.  The same illness has been going around in her family and over the last 2 weeks.  She has not had the COVID-19 vaccine.    She is currently breast-feeding    She is also having left hip and leg pain over the last 2 months which has not improved.  There has not been any injury.  She used to exercise frequently but has not after having her third child due to time constraints.  At the end of the day the left hip and left lower back pain gets worse.  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  She has not tried any over-the-counter medications.  She is open to physical therapy.    HPI    ROS:  Review of Systems   Constitutional: Positive for activity change and fatigue. Negative for chills, diaphoresis and fever.   HENT: Positive for congestion, postnasal drip and rhinorrhea. Negative for sore throat.    Respiratory: Positive for cough (Infrequently). Negative for shortness of breath.    Cardiovascular: Negative.    Musculoskeletal: Positive for arthralgias and back pain.   Neurological: Positive for headaches.         Current Outpatient Medications:   •  azithromycin (ZITHROMAX) 250 MG tablet, Take 2 tablets the first day, then 1 tablet daily for 4 days., Disp: 6 tablet, Rfl: 0  •  methylPREDNISolone (MEDROL) 4 MG dose pack, Take as directed on package instructions.,  Disp: 21 each, Rfl: 0    Lab Results   Component Value Date    GLUCOSE 88 01/30/2018    BUN 19 01/30/2018    CREATININE 0.86 01/30/2018    EGFRIFNONA 76 01/30/2018    BCR 22.1 01/30/2018    K 4.0 01/30/2018    CO2 28.0 01/30/2018    CALCIUM 9.5 01/30/2018    ALBUMIN 4.50 01/30/2018    AST 25 01/30/2018    ALT 29 01/30/2018       WBC   Date Value Ref Range Status   06/15/2020 7.59 3.40 - 10.80 10*3/mm3 Final   10/31/2019 6.6 3.4 - 10.8 x10E3/uL Final     RBC   Date Value Ref Range Status   06/15/2020 4.21 3.77 - 5.28 10*6/mm3 Final   10/31/2019 4.46 3.77 - 5.28 x10E6/uL Final     Hemoglobin   Date Value Ref Range Status   06/16/2020 8.8 (L) 12.0 - 15.9 g/dL Final     Hematocrit   Date Value Ref Range Status   06/16/2020 26.4 (L) 34.0 - 46.6 % Final     MCV   Date Value Ref Range Status   06/15/2020 88.4 79.0 - 97.0 fL Final     MCH   Date Value Ref Range Status   06/15/2020 29.5 26.6 - 33.0 pg Final     MCHC   Date Value Ref Range Status   06/15/2020 33.3 31.5 - 35.7 g/dL Final     RDW   Date Value Ref Range Status   06/15/2020 13.0 12.3 - 15.4 % Final     RDW-SD   Date Value Ref Range Status   06/15/2020 41.6 37.0 - 54.0 fl Final     MPV   Date Value Ref Range Status   06/15/2020 11.5 6.0 - 12.0 fL Final     Platelets   Date Value Ref Range Status   06/15/2020 202 140 - 450 10*3/mm3 Final     Neutrophil Rel %   Date Value Ref Range Status   10/31/2019 71 Not Estab. % Final     Neutrophil %   Date Value Ref Range Status   12/11/2018 72.4 39.0 - 78.0 % Final     Lymphocyte Rel %   Date Value Ref Range Status   10/31/2019 20 Not Estab. % Final     Lymphocyte %   Date Value Ref Range Status   12/11/2018 18.2 15.0 - 45.0 % Final     Monocyte Rel %   Date Value Ref Range Status   10/31/2019 7 Not Estab. % Final     Monocyte %   Date Value Ref Range Status   12/11/2018 6.9 4.0 - 12.0 % Final     Eosinophil Rel %   Date Value Ref Range Status   10/31/2019 1 Not Estab. % Final     Eosinophil %   Date Value Ref Range Status  "  12/11/2018 1.3 0.0 - 4.0 % Final     Basophil Rel %   Date Value Ref Range Status   10/31/2019 1 Not Estab. % Final     Basophil %   Date Value Ref Range Status   12/11/2018 0.8 0.0 - 2.0 % Final     Immature Grans %   Date Value Ref Range Status   12/11/2018 0.4 0.0 - 5.0 % Final     Neutrophils Absolute   Date Value Ref Range Status   10/31/2019 4.7 1.4 - 7.0 x10E3/uL Final     Neutrophils, Absolute   Date Value Ref Range Status   12/11/2018 5.74 1.87 - 8.40 10*3/mm3 Final     Lymphocytes Absolute   Date Value Ref Range Status   10/31/2019 1.3 0.7 - 3.1 x10E3/uL Final     Lymphocytes, Absolute   Date Value Ref Range Status   12/11/2018 1.44 0.72 - 4.86 10*3/mm3 Final     Monocytes Absolute   Date Value Ref Range Status   10/31/2019 0.4 0.1 - 0.9 x10E3/uL Final     Monocytes, Absolute   Date Value Ref Range Status   12/11/2018 0.55 0.19 - 1.30 10*3/mm3 Final     Eosinophils Absolute   Date Value Ref Range Status   10/31/2019 0.1 0.0 - 0.4 x10E3/uL Final     Eosinophils, Absolute   Date Value Ref Range Status   12/11/2018 0.10 0.00 - 0.70 10*3/mm3 Final     Basophils Absolute   Date Value Ref Range Status   10/31/2019 0.0 0.0 - 0.2 x10E3/uL Final     Basophils, Absolute   Date Value Ref Range Status   12/11/2018 0.06 0.00 - 0.20 10*3/mm3 Final     Immature Grans, Absolute   Date Value Ref Range Status   12/11/2018 0.03 0.00 - 0.03 10*3/mm3 Final     nRBC   Date Value Ref Range Status   12/11/2018 0.0 0.0 - 0.0 /100 WBC Final         OBJECTIVE:  Visit Vitals  /80 (BP Location: Left arm, Patient Position: Sitting, Cuff Size: Adult)   Pulse 94   Temp 98.2 °F (36.8 °C) (Oral)   Resp 15   Ht 172.7 cm (67.99\")   Wt 57.2 kg (126 lb 1.6 oz)   SpO2 99%   BMI 19.18 kg/m²      Physical Exam  Vitals reviewed.   Constitutional:       General: She is not in acute distress.     Appearance: She is well-developed and normal weight. She is not diaphoretic.      Comments: Very pleasant   HENT:      Head: Normocephalic and " atraumatic.      Right Ear: Tympanic membrane, ear canal and external ear normal. There is no impacted cerumen.      Left Ear: Ear canal and external ear normal. A middle ear effusion is present. There is no impacted cerumen. Tympanic membrane is not injected, scarred, erythematous, retracted or bulging.      Nose: Congestion present.      Right Turbinates: Enlarged and swollen.      Left Turbinates: Enlarged and swollen.      Right Sinus: No maxillary sinus tenderness or frontal sinus tenderness.      Left Sinus: No maxillary sinus tenderness or frontal sinus tenderness.      Mouth/Throat:      Mouth: Mucous membranes are dry.      Pharynx: Oropharynx is clear. No oropharyngeal exudate.   Eyes:      Extraocular Movements: Extraocular movements intact.      Pupils: Pupils are equal, round, and reactive to light.   Neck:      Thyroid: No thyromegaly.      Trachea: Phonation normal.   Cardiovascular:      Rate and Rhythm: Normal rate and regular rhythm.      Heart sounds: No murmur heard.     Pulmonary:      Effort: Pulmonary effort is normal. No respiratory distress.      Breath sounds: Normal breath sounds. No stridor. No wheezing, rhonchi or rales.   Musculoskeletal:        Back:       Right hip: Bony tenderness present. No tenderness or crepitus.      Comments: Area of tenderness   Skin:     General: Skin is warm and dry.      Coloration: Skin is not jaundiced or pale.      Findings: No erythema.   Neurological:      Mental Status: She is alert.   Psychiatric:         Mood and Affect: Mood normal.         Behavior: Behavior normal.         Thought Content: Thought content normal.         Judgment: Judgment normal.         Assessment/Plan    Diagnoses and all orders for this visit:    1. Viral URI (Primary)  -     COVID PRE-OP / PRE-PROCEDURE SCREENING ORDER (NO ISOLATION) - Swab, Oropharynx; Future  -     QUESTIONNAIRE SERIES  -     methylPREDNISolone (MEDROL) 4 MG dose pack; Take as directed on package  instructions.  Dispense: 21 each; Refill: 0  -     azithromycin (ZITHROMAX) 250 MG tablet; Take 2 tablets the first day, then 1 tablet daily for 4 days.  Dispense: 6 tablet; Refill: 0    2. Left hip pain  -     methylPREDNISolone (MEDROL) 4 MG dose pack; Take as directed on package instructions.  Dispense: 21 each; Refill: 0  -     Ambulatory Referral to Physical Therapy Evaluate and treat      I suspect she has a viral upper respiratory infection, likely the common cold.  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    I am going to send in a prescription of azithromycin and Medrol Dosepak.  These should be safe with lactation, but she will also talk with lactation specialist here at the hospital to confirm that they are safe.    I suspect the left hip pain is left piriformis syndrome with associated sciatica.  The steroids I am prescribing above should help with her pain.  I am also going to send referral to physical therapy.  I do not believe films are needed at this time.    Continue to monitor the response to treatment and further work-up based on symptoms.    We discussed COVID-19 vaccine and she is open to getting one.    Follow-up 1 year for annual physical or sooner if needed    Return in about 1 year (around 5/21/2022) for Annual physical.    Patient was given instructions and counseling regarding his/her condition or for health maintenance advice. Please see specific information pulled into the AVS if appropriate.     SHAHAB Rothmna MD   08:34 CDT  5/21/2021

## 2021-06-02 ENCOUNTER — TREATMENT (OUTPATIENT)
Dept: PHYSICAL THERAPY | Facility: CLINIC | Age: 36
End: 2021-06-02

## 2021-06-02 DIAGNOSIS — M79.18 LEFT BUTTOCK PAIN: ICD-10-CM

## 2021-06-02 DIAGNOSIS — M25.559 PAIN, HIP: Primary | ICD-10-CM

## 2021-06-02 PROCEDURE — 97162 PT EVAL MOD COMPLEX 30 MIN: CPT | Performed by: PHYSICAL THERAPIST

## 2021-06-02 NOTE — PROGRESS NOTES
"Physical Therapy Therapy Initial Evaluation and Plan of Care    Patient: Oneil Kothari               : 1985  Visit Date: 2021  Referring practitioner: NILE Rothman MD  Date of Initial Visit: 2021  Patient seen for 1 sessions    Visit Diagnoses:    ICD-10-CM ICD-9-CM   1. Pain, hip  M25.559 719.45   2. Left buttock pain  M79.18 729.1     Past Medical History:   Diagnosis Date   • Abnormal Pap smear of cervix    • Gestational hypertension      Past Surgical History:   Procedure Laterality Date   • CERVICAL BIOPSY  W/ LOOP ELECTRODE EXCISION     • WISDOM TOOTH EXTRACTION         SUBJECTIVE     Subjective Evaluation    History of Present Illness  Date of onset: 2021  Mechanism of injury: She has been having pain in the L \"buttock\" and down the outside of her L thigh to her knee for about a month and a half. She saw a chiropractor who said her pelvis was rotated and he adjusted her. She didn't have any low back pain after that but this pain hasn't gotten any better. She has had 3 children in the past 6 years with the youngest turning 1 this month. She had SI problems with each pregnancy and had to wear an SI belt which made it better. The belt has not made this any better.     Subjective comment: She took 3 ibuprofen on Saturday and it took care of the pain all day.   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    Pain  Current pain ratin  At best pain ratin  At worst pain ratin  Location: L lateral to sacrum and down outer thigh  Quality: radiating, tight, pulling, pressure and dull ache  Relieving factors: change in position  Aggravating factors: outstretched reach, prolonged positioning, ambulation and lifting (sitting)  Progression: no change    Social Support  Lives in: multiple-level home  Lives with: spouse and young children    Hand dominance: right    Patient Goals  Patient goals for therapy: decreased pain and return to sport/leisure activities         PT " "G-Codes  Outcome Measure Options: Lower Extremity Functional Scale (LEFS)  Total: 55    OBJECTIVE     Objective          Tenderness     Lumbar Spine  No tenderness in the left transverse process.     Left Hip   No tenderness in the PSIS.     Neurological Testing     Sensation     Hip   Left Hip   Intact: light touch    Strength/Myotome Testing     Left Hip   Normal muscle strength    Isolated Muscles   Gluteus mariah: 4+    Right Hip   Normal muscle strength    Tests     Lumbar     Left   Positive valsalva.   Negative crossed SLR, femoral stretch, passive SLR, quadrant and reverse leg-raising.     Left Pelvic Girdle/Sacrum   Negative: sacrum compression, sacral spring and thigh thrust.     Right Hip Flexibility Comments:   Hamstring tightness more noted than on LLE    Functional Assessment     Comments  The only motion that reproduced her pain was in standing, leaning forward as she would to place baby in crib. She lifts LLE up and only uses RLE. This is still painful, but not as painful as bending forward with both legs down.   Also noted in sitting. When she sits in anterior pelvic tilt and then goes to lean forward, it flares the pain.       Therapy Education/Self Care 67010   Details: Use both legs   Given symptom relief   Progress: New   Education provided to:  Patient   Level of understanding Verbalized   Timed Minutes      Dry Needle Location [20560 (1-2 muscles)/20561 (3 or more muscles)]   Location Depth (\") Comment   Inferior glute 1    L SI 1    Symptomatic point lateral to L sacrum 2 Deep ache felt                                     Total Timed Treatment:        mins  Total Time of Visit:            60   mins    ASSESSMENT/PLAN   GOALS:    Assessment & Plan     Assessment  Assessment details: She isn't showing weakness upon evaluation. In fact her pain was not really present when we started. I was able to find the \"spot\" along the lateral border of her sacrum on the L. Eccentric lumbar extension also " really irritated her pain. I'm not sure the exact cause, but possibly abnormal forces through her pelvis from the erector spinae or even multifidi are causing some chronic irritation. She has favored using the RLE for over a month, even after the chiropractor got her pelvis back in alignment. Since she also always carries her baby on her R side, I think she has continued the habit of avoiding use of the LLE and where she has had multiple pregnancies and SI instability, something is moving more than it should. She is going to see how she responds to the dry needling today and work on using the legs more equally. She may take ibuprofen regularly for a week or so while she work on that and see if she can come back off it without the same symptoms.     Plan  Planned modality interventions: dry needling, low level laser therapy and high voltage pulsed current (pain management)  Plan details: Plans to work on things we discussed today. Will return for needling if she feels it is beneficial.         PT SIGNATURE: Thu Schmitt, PT, DPT, CLT-SCOTTY       Initial Certification  Certification Period: 8/31/2021  I certify that the therapy services are furnished while this patient is under my care.  The services outlined above are required by this patient, and will be reviewed every 90 days.     PHYSICIAN:     NILE Rothman MD______________________________________DATE: _________     Please sign and return via fax to [phone].   Thank you so much for letting us work with Oneil. I appreciate your letting us work with your patients. If you have any questions or concerns, please don't hesitate to contact me.

## 2021-06-15 ENCOUNTER — TREATMENT (OUTPATIENT)
Dept: PHYSICAL THERAPY | Facility: CLINIC | Age: 36
End: 2021-06-15

## 2021-06-15 DIAGNOSIS — M25.559 PAIN, HIP: Primary | ICD-10-CM

## 2021-06-15 DIAGNOSIS — M79.18 LEFT BUTTOCK PAIN: ICD-10-CM

## 2021-06-15 PROCEDURE — 97140 MANUAL THERAPY 1/> REGIONS: CPT | Performed by: PHYSICAL THERAPIST

## 2021-06-15 PROCEDURE — 97535 SELF CARE MNGMENT TRAINING: CPT | Performed by: PHYSICAL THERAPIST

## 2021-06-15 NOTE — PROGRESS NOTES
"Physical Therapy Treatment Note    Patient: Oneil Kothari                                                                                     Visit Date: 6/15/2021  :     1985    Referring practitioner:    NILE Rothman MD  Date of Initial Visit:          Type: THERAPY  Noted: 2021    Patient seen for 2 sessions    Visit Diagnoses:    ICD-10-CM ICD-9-CM   1. Pain, hip  M25.559 719.45   2. Left buttock pain  M79.18 729.1     SUBJECTIVE     Subjective She hasn't taken ibuprofen today. She was hurting earlier but it's less now. She says the pain is more down the back of her thigh. She had her  do a SLR and this reproduced pain. Her pain is worst in the evening at 6-7/10    PAIN: 110         OBJECTIVE     Objective          Active Range of Motion   Cervical/Thoracic Spine     Thoracic   Flexion: WFL and with pain  Extension: WFL    Strength/Myotome Testing     Lumbar   Left   Normal strength    Right   Normal strength         Dry Needle Location [ (1-2 muscles)/ (3 or more muscles)]   Location Depth (\") Comment   Left L4,5 post cut 2    Left sup cluneal 3    Left inf gluteal 3    Left katia-sciatic 2 Medial and lat hams x 2 each   Left sural  2    Left saphenous n 2    Left tibial n 2                Manual Therapy     10599  Comments   Prone left lower lumbar PA mobs    Prone left lower lumbar STM    Taped olayinka lumbar with cover roll and leukotape            Timed Minutes 15        Therapy Education/Self Care 10122   Details: Anatomy of HNP  Precautions if she has a HNP  Teresa exercises incluing EIS, EIL and left SG   Given Home Exercise Program  symptom relief   Progress: New   Education provided to:  Patient   Level of understanding Verbalized, Demonstrated and Teach back level of understanding   Timed Minutes 10         Total Timed Treatment:     25   mins  Total Time of Visit:             40   mins         ASSESSMENT/PLAN     GOALS  Goals                                          " Progress Note due by 7/15/21   LTG by: 4 weeks Comments Date Status   No radicular symptoms for a week   NEW   Able to progress with flexibility exercises with no exacerbation of symptoms   NEW   ind with HEP for flexibility and core stability   NEW               Assessment/Plan     ASSESSMENT: Her symptoms now appear to be consistent with a disc injury. I taped her back to try to protect it more and help her relearn how to move without pulling on her back. Her positive Loree Palmer and SLR, directional preference of extension make me lean toward this. She is more encouraged now that she has a grasp of what the issue is and how to protect it.     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 Bring her back in if she has a flare and might need manual therapy. Focus more on extension/Teresa approach.     Signature: Hunter Sharpe, PT

## 2021-06-17 DIAGNOSIS — M54.16 LUMBAR RADICULOPATHY: Primary | ICD-10-CM

## 2021-06-28 ENCOUNTER — TREATMENT (OUTPATIENT)
Dept: PHYSICAL THERAPY | Facility: CLINIC | Age: 36
End: 2021-06-28

## 2021-06-28 ENCOUNTER — OFFICE VISIT (OUTPATIENT)
Dept: INTERNAL MEDICINE | Facility: CLINIC | Age: 36
End: 2021-06-28

## 2021-06-28 VITALS
TEMPERATURE: 97.5 F | DIASTOLIC BLOOD PRESSURE: 72 MMHG | HEART RATE: 86 BPM | RESPIRATION RATE: 15 BRPM | SYSTOLIC BLOOD PRESSURE: 124 MMHG | BODY MASS INDEX: 19.4 KG/M2 | HEIGHT: 68 IN | OXYGEN SATURATION: 99 % | WEIGHT: 128 LBS

## 2021-06-28 DIAGNOSIS — M54.16 LUMBAR RADICULOPATHY: Primary | ICD-10-CM

## 2021-06-28 DIAGNOSIS — M25.559 PAIN, HIP: Primary | ICD-10-CM

## 2021-06-28 DIAGNOSIS — M79.18 LEFT BUTTOCK PAIN: ICD-10-CM

## 2021-06-28 PROCEDURE — 99213 OFFICE O/P EST LOW 20 MIN: CPT | Performed by: INTERNAL MEDICINE

## 2021-06-28 PROCEDURE — 96372 THER/PROPH/DIAG INJ SC/IM: CPT | Performed by: INTERNAL MEDICINE

## 2021-06-28 PROCEDURE — 97140 MANUAL THERAPY 1/> REGIONS: CPT | Performed by: PHYSICAL THERAPIST

## 2021-06-28 PROCEDURE — 97014 ELECTRIC STIMULATION THERAPY: CPT | Performed by: PHYSICAL THERAPIST

## 2021-06-28 RX ORDER — HYDROCODONE BITARTRATE AND ACETAMINOPHEN 5; 325 MG/1; MG/1
1 TABLET ORAL EVERY 6 HOURS PRN
Qty: 15 TABLET | Refills: 0 | Status: SHIPPED | OUTPATIENT
Start: 2021-06-28 | End: 2021-09-21

## 2021-06-28 RX ORDER — METHYLPREDNISOLONE 4 MG/1
TABLET ORAL
Qty: 21 EACH | Refills: 0 | Status: SHIPPED | OUTPATIENT
Start: 2021-06-28 | End: 2021-09-21

## 2021-06-28 RX ORDER — METHYLPREDNISOLONE ACETATE 40 MG/ML
40 INJECTION, SUSPENSION INTRA-ARTICULAR; INTRALESIONAL; INTRAMUSCULAR; SOFT TISSUE ONCE
Status: COMPLETED | OUTPATIENT
Start: 2021-06-28 | End: 2021-06-28

## 2021-06-28 RX ADMIN — METHYLPREDNISOLONE ACETATE 40 MG: 40 INJECTION, SUSPENSION INTRA-ARTICULAR; INTRALESIONAL; INTRAMUSCULAR; SOFT TISSUE at 11:43

## 2021-06-28 NOTE — PROGRESS NOTES
"Physical Therapy Treatment Note    Patient: Oneil Kothari                                                                                     Visit Date: 2021  :     1985    Referring practitioner:    NILE Rothman MD  Date of Initial Visit:          Type: THERAPY  Noted: 2021    Patient seen for 3 sessions    Visit Diagnoses:    ICD-10-CM ICD-9-CM   1. Pain, hip  M25.559 719.45   2. Left buttock pain  M79.18 729.1     SUBJECTIVE     Subjective She has reached a point where she is really struggling with pain going down her leg. She started having more radicular symptoms but over the weekend, it became more unbearable. She says her left DTR  was diminished at Dr. Rothman's.     PAIN: 7/10 before, 0/10 after         OBJECTIVE     Objective      Dry Needle Location [ (1-2 muscles)/ (3 or more muscles)]   Location Depth (\") Comment   Left L4,5 post cut 2    Left inf gluteal 3    Left sural  2 x2   Left common fibular 2                     Manual Therapy     70142  Comments   Prone left lower lumbar PA mobs gentle   Prone left lower lumbar STM    Prone left LS man distraction Increased left leg pain   Supine LLE LAD sustained in loose packed position        Timed Minutes 15     Modalities Comments   premod estim left L5 to piriformis and left sural n needles Used dry needles for pathway, 10 min at 1-2 intensity (tolerance)       Minutes 10        Therapy Education/Self Care 30004   Details: Prone to rest  Cat/cow  May have  try LAD is helps   Given Home Exercise Program  symptom relief   Progress: Modified   Education provided to:  Patient   Level of understanding Verbalized, Demonstrated and Teach back level of understanding   Timed Minutes 10         Total Timed Treatment:     15   mins  Total Time of Visit:             35   mins         ASSESSMENT/PLAN     GOALS  Goals                                          Progress Note due by 7/15/21   LTG by: 4 weeks Comments Date Status "   No radicular symptoms for a week Radicular symptoms have worsened 6/28 worse   Able to progress with flexibility exercises with no exacerbation of symptoms Not ready for this 6/28 ongoing   ind with HEP for flexibility and core stability See above, not ready for stability 6/28 ongoing               Assessment/Plan     ASSESSMENT: Her symptoms are still consistent with a disc problem but we will find out Wednesday when she has her MRI. If she does have a large HNP, it wouldn't change much of what we do. If there is no HNP, I will feel more confident in mobilizing her back. She responded well today and walked out with no pain.     PLAN: schedule more for consistent PT to keep up with this so it doesn't get out of control and to be able to consistently centralize the pain.    Signature: Hunter Sharpe, PT

## 2021-06-28 NOTE — PROGRESS NOTES
Chief Complaint   Patient presents with   • Office Visit   • Back Pain     Inbearable          History:  Oneil Kothari is a 35 y.o. female who presents today for evaluation of the above problems.      She presents today for an acute visit for worsening lower back and left leg pain.  She has been working with physical therapy, and has gone to a chiropractor without any benefit.  The pain has significantly worsened since the last visit in May.  Steroids at that time did help but the pain returned shortly after finishing the steroids.  She has MRI scheduled on June 30 and appointment with neurosurgery later in July.  She has a very difficult time working due to the pain.    Ibuprofen 400 mg twice a day used to last her all day long but now 600 mg only last 4 hours.  Sitting down is intolerable.  The pain last night worsened and woke her up from sleep.  She had leftover Norco 5 mg from when she gave birth which helped her get back to sleep and helped the pain.  The pain is located in the lateral aspect of her left thigh and posterior leg down to her calf.  There is no weakness, but mild intermittent numbness.  Feels like a deep pain and at times is electrical.    She is still currently breast-feeding, but knows a lactation specialist and she will talk about medications with her.    HPI    ROS:  Review of Systems   Constitutional: Positive for activity change.   HENT: Negative for sore throat.    Cardiovascular: Negative.    Musculoskeletal: Positive for arthralgias, back pain and gait problem.   Neurological: Positive for numbness (intermittent). Negative for weakness.   Psychiatric/Behavioral: Positive for sleep disturbance.         Current Outpatient Medications:   •  HYDROcodone-acetaminophen (Norco) 5-325 MG per tablet, Take 1 tablet by mouth Every 6 (Six) Hours As Needed for Moderate Pain ., Disp: 15 tablet, Rfl: 0  •  methylPREDNISolone (MEDROL) 4 MG dose pack, Take as directed on package instructions., Disp: 21  each, Rfl: 0  No current facility-administered medications for this visit.    Lab Results   Component Value Date    GLUCOSE 88 01/30/2018    BUN 19 01/30/2018    CREATININE 0.86 01/30/2018    EGFRIFNONA 76 01/30/2018    BCR 22.1 01/30/2018    K 4.0 01/30/2018    CO2 28.0 01/30/2018    CALCIUM 9.5 01/30/2018    ALBUMIN 4.50 01/30/2018    AST 25 01/30/2018    ALT 29 01/30/2018       WBC   Date Value Ref Range Status   06/15/2020 7.59 3.40 - 10.80 10*3/mm3 Final   10/31/2019 6.6 3.4 - 10.8 x10E3/uL Final     RBC   Date Value Ref Range Status   06/15/2020 4.21 3.77 - 5.28 10*6/mm3 Final   10/31/2019 4.46 3.77 - 5.28 x10E6/uL Final     Hemoglobin   Date Value Ref Range Status   06/16/2020 8.8 (L) 12.0 - 15.9 g/dL Final     Hematocrit   Date Value Ref Range Status   06/16/2020 26.4 (L) 34.0 - 46.6 % Final     MCV   Date Value Ref Range Status   06/15/2020 88.4 79.0 - 97.0 fL Final     MCH   Date Value Ref Range Status   06/15/2020 29.5 26.6 - 33.0 pg Final     MCHC   Date Value Ref Range Status   06/15/2020 33.3 31.5 - 35.7 g/dL Final     RDW   Date Value Ref Range Status   06/15/2020 13.0 12.3 - 15.4 % Final     RDW-SD   Date Value Ref Range Status   06/15/2020 41.6 37.0 - 54.0 fl Final     MPV   Date Value Ref Range Status   06/15/2020 11.5 6.0 - 12.0 fL Final     Platelets   Date Value Ref Range Status   06/15/2020 202 140 - 450 10*3/mm3 Final     Neutrophil Rel %   Date Value Ref Range Status   10/31/2019 71 Not Estab. % Final     Neutrophil %   Date Value Ref Range Status   12/11/2018 72.4 39.0 - 78.0 % Final     Lymphocyte Rel %   Date Value Ref Range Status   10/31/2019 20 Not Estab. % Final     Lymphocyte %   Date Value Ref Range Status   12/11/2018 18.2 15.0 - 45.0 % Final     Monocyte Rel %   Date Value Ref Range Status   10/31/2019 7 Not Estab. % Final     Monocyte %   Date Value Ref Range Status   12/11/2018 6.9 4.0 - 12.0 % Final     Eosinophil Rel %   Date Value Ref Range Status   10/31/2019 1 Not Estab. %  "Final     Eosinophil %   Date Value Ref Range Status   12/11/2018 1.3 0.0 - 4.0 % Final     Basophil Rel %   Date Value Ref Range Status   10/31/2019 1 Not Estab. % Final     Basophil %   Date Value Ref Range Status   12/11/2018 0.8 0.0 - 2.0 % Final     Immature Grans %   Date Value Ref Range Status   12/11/2018 0.4 0.0 - 5.0 % Final     Neutrophils Absolute   Date Value Ref Range Status   10/31/2019 4.7 1.4 - 7.0 x10E3/uL Final     Neutrophils, Absolute   Date Value Ref Range Status   12/11/2018 5.74 1.87 - 8.40 10*3/mm3 Final     Lymphocytes Absolute   Date Value Ref Range Status   10/31/2019 1.3 0.7 - 3.1 x10E3/uL Final     Lymphocytes, Absolute   Date Value Ref Range Status   12/11/2018 1.44 0.72 - 4.86 10*3/mm3 Final     Monocytes Absolute   Date Value Ref Range Status   10/31/2019 0.4 0.1 - 0.9 x10E3/uL Final     Monocytes, Absolute   Date Value Ref Range Status   12/11/2018 0.55 0.19 - 1.30 10*3/mm3 Final     Eosinophils Absolute   Date Value Ref Range Status   10/31/2019 0.1 0.0 - 0.4 x10E3/uL Final     Eosinophils, Absolute   Date Value Ref Range Status   12/11/2018 0.10 0.00 - 0.70 10*3/mm3 Final     Basophils Absolute   Date Value Ref Range Status   10/31/2019 0.0 0.0 - 0.2 x10E3/uL Final     Basophils, Absolute   Date Value Ref Range Status   12/11/2018 0.06 0.00 - 0.20 10*3/mm3 Final     Immature Grans, Absolute   Date Value Ref Range Status   12/11/2018 0.03 0.00 - 0.03 10*3/mm3 Final     nRBC   Date Value Ref Range Status   12/11/2018 0.0 0.0 - 0.0 /100 WBC Final         OBJECTIVE:  Visit Vitals  /72 (BP Location: Left arm, Patient Position: Sitting, Cuff Size: Adult)   Pulse 86   Temp 97.5 °F (36.4 °C) (Oral)   Resp 15   Ht 172.7 cm (67.99\")   Wt 58.1 kg (128 lb)   SpO2 99%   BMI 19.47 kg/m²      Physical Exam  Vitals reviewed.   Constitutional:       General: She is not in acute distress.     Appearance: Normal appearance. She is well-developed. She is not ill-appearing or diaphoretic.      " Comments: Pleasant, but clearly in pain and uncomfortable   HENT:      Head: Normocephalic and atraumatic.   Eyes:      Pupils: Pupils are equal, round, and reactive to light.   Neck:      Thyroid: No thyromegaly.      Trachea: Phonation normal.   Pulmonary:      Effort: No respiratory distress.   Musculoskeletal:      Lumbar back: Decreased range of motion. Positive left straight leg raise test.        Back:    Skin:     Coloration: Skin is not pale.      Findings: No erythema.   Neurological:      Mental Status: She is alert.      Deep Tendon Reflexes:      Reflex Scores:       Patellar reflexes are 2+ on the right side and 1+ on the left side.     Comments: 5/5 Lower extremity strength   Psychiatric:         Behavior: Behavior normal.         Thought Content: Thought content normal.         Judgment: Judgment normal.         Assessment/Plan    Diagnoses and all orders for this visit:    1. Lumbar radiculopathy (Primary)  -     methylPREDNISolone (MEDROL) 4 MG dose pack; Take as directed on package instructions.  Dispense: 21 each; Refill: 0  -     HYDROcodone-acetaminophen (Norco) 5-325 MG per tablet; Take 1 tablet by mouth Every 6 (Six) Hours As Needed for Moderate Pain .  Dispense: 15 tablet; Refill: 0  -     methylPREDNISolone acetate (DEPO-medrol) injection 40 mg      Her pain has significantly worsened.  She will be getting her MRI on Wednesday.  Would like to give methylprednisolone IM injection today in the office and prescribe another steroid pack.  Given the worsening pain would like her to take as needed Norco 5 mg.  Advised her not to breast-feed while taking this medication and she will use it sparingly for severe pain.  We may need to get her in with neurosurgery sooner depending on the MRI results.  In the meantime, continue with physical therapy and other conservative measures.    No follow-ups on file.    Patient was given instructions and counseling regarding his/her condition or for health  maintenance advice. Please see specific information pulled into the AVS if appropriate.     SHAHAB Rothman MD   11:18 CDT  6/28/2021

## 2021-06-30 ENCOUNTER — OFFICE VISIT (OUTPATIENT)
Dept: NEUROSURGERY | Facility: CLINIC | Age: 36
End: 2021-06-30

## 2021-06-30 ENCOUNTER — HOSPITAL ENCOUNTER (OUTPATIENT)
Dept: GENERAL RADIOLOGY | Facility: HOSPITAL | Age: 36
Discharge: HOME | End: 2021-06-30

## 2021-06-30 ENCOUNTER — HOSPITAL ENCOUNTER (OUTPATIENT)
Dept: MRI IMAGING | Facility: HOSPITAL | Age: 36
Discharge: HOME | End: 2021-06-30

## 2021-06-30 VITALS
HEIGHT: 68 IN | WEIGHT: 125 LBS | SYSTOLIC BLOOD PRESSURE: 110 MMHG | BODY MASS INDEX: 18.94 KG/M2 | DIASTOLIC BLOOD PRESSURE: 68 MMHG

## 2021-06-30 DIAGNOSIS — Z78.9 NON-SMOKER: ICD-10-CM

## 2021-06-30 DIAGNOSIS — M51.26 LUMBAR DISC HERNIATION: Primary | ICD-10-CM

## 2021-06-30 DIAGNOSIS — M54.16 LUMBAR RADICULOPATHY: ICD-10-CM

## 2021-06-30 PROCEDURE — 72100 X-RAY EXAM L-S SPINE 2/3 VWS: CPT

## 2021-06-30 PROCEDURE — 99214 OFFICE O/P EST MOD 30 MIN: CPT | Performed by: NURSE PRACTITIONER

## 2021-06-30 PROCEDURE — 72148 MRI LUMBAR SPINE W/O DYE: CPT

## 2021-06-30 RX ORDER — KETOROLAC TROMETHAMINE 10 MG/1
10 TABLET, FILM COATED ORAL EVERY 6 HOURS PRN
Qty: 20 TABLET | Refills: 0 | Status: SHIPPED | OUTPATIENT
Start: 2021-06-30 | End: 2021-09-21

## 2021-06-30 NOTE — PROGRESS NOTES
Chief complaint:   Chief Complaint   Patient presents with   • Back Pain     Oneil has been referred here today for follow up for her back pain with left leg pain, her imaging was done here at [facility] and she is presently in physical therapy        Subjective     HPI: This is a 35-year-old female patient who was referred to us by Dr. Albert Rothman for back and left leg pain.  She is here to be evaluated today patient says that her pain started back in February and was initially back pain but after a few days it is start going down into her left lower extremity.  There is no accident or injury associated with this.  She says that her back pain is more of a dull ache and stiffness but her biggest pain complaint starts in her left buttocks and goes down her leg in a lateral radicular fashion until it gets past the knee and then goes to the posterior calf region but does not go to the ankle.  Denies any numbness or tingling.  She denies any bowel or bladder incontinence.  She has done 3 sessions of therapy but before they continued they wanted imaging to make sure that there was nothing else going on from a spinal standpoint.  She has attempted chiropractic care without any significant improvement.  She has done 2 rounds of Medrol Dosepak without any significant improvement.  She has been working with her primary care doctor with medication.  She has not had any injections in her back.  Rates her pain on a scale 0-10 at a 6.  She says it does interfere with actives of daily living.  She is right-hand dominant.  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  She is .  Denies any tobacco or illicit drug use.  She does drink alcohol occasionally.    Review of Systems   Constitutional: Positive for activity change.   Musculoskeletal: Positive for back pain.   Neurological: Negative for numbness.   All other systems reviewed and are negative.       Past Medical History:   Diagnosis  "Date   • Abnormal Pap smear of cervix    • Gestational hypertension    • Low back pain      Past Surgical History:   Procedure Laterality Date   • CERVICAL BIOPSY  W/ LOOP ELECTRODE EXCISION     • WISDOM TOOTH EXTRACTION       Family History   Problem Relation Age of Onset   • Thyroid disease Father    • Hypertension Mother    • No Known Problems Brother    • No Known Problems Sister    • No Known Problems Brother      Social History     Tobacco Use   • Smoking status: Never Smoker   • Smokeless tobacco: Never Used   Vaping Use   • Vaping Use: Never used   Substance Use Topics   • Alcohol use: No   • Drug use: No     (Not in a hospital admission)    Allergies:  Patient has no known allergies.    Objective      Vital Signs  /68   Ht 172.7 cm (68\")   Wt 56.7 kg (125 lb)   BMI 19.01 kg/m²     Physical Exam  Constitutional:       Appearance: Normal appearance. She is well-developed.   HENT:      Head: Normocephalic.   Eyes:      General: Lids are normal.      Conjunctiva/sclera: Conjunctivae normal.      Pupils: Pupils are equal, round, and reactive to light.   Cardiovascular:      Rate and Rhythm: Normal rate and regular rhythm.   Pulmonary:      Effort: Pulmonary effort is normal.      Breath sounds: Normal breath sounds.   Musculoskeletal:         General: Normal range of motion.      Cervical back: Normal range of motion.      Comments: Back pain   Skin:     General: Skin is warm.   Neurological:      Mental Status: She is alert and oriented to person, place, and time.      GCS: GCS eye subscore is 4. GCS verbal subscore is 5. GCS motor subscore is 6.      Cranial Nerves: No cranial nerve deficit.      Sensory: No sensory deficit.      Deep Tendon Reflexes: Reflexes are normal and symmetric. Reflexes normal.   Psychiatric:         Speech: Speech normal.         Behavior: Behavior normal.         Thought Content: Thought content normal.         Neurologic Exam     Mental Status   Oriented to person, place, " and time.   Speech: speech is normal     Cranial Nerves     CN III, IV, VI   Pupils are equal, round, and reactive to light.      Imaging review: RI of the lumbar spine that was done on June 30, 2021 shows the patient does have a disc herniation at L5-S1 off to the left causing pressure on the exiting nerve root.  Mild disc degeneration noted at L4-5 and L5-S1.  No fracture visualized.  No cord signal change.  No malalignment    L5-S1      Assessment/Plan: At this point I am going to start the patient on Toradol to see if this will help with her pain issues.  She can continue with Medrol Dosepak and the hydrocodone from her primary care doctor.  For first line conservative care of lumbar pain, I would like to send Ms. Kothari for a dedicated course of physician directed physical therapy consisting of 2-3 times a week for 4-6 weeks.  She is doing therapy here [facility]    Return for reassessment with me after 6-8 weeks after physical therapy.  Should she not make any progress from the therapy the patient would be a candidate for microdiscectomy versus epidural steroid injections.      I advised the patient to call and return sooner for new or worsening complaints of weakness, paresthesias, gait disturbances, or any additional concerns.  Treatment options discussed in detail with Oneil and the patient voiced understanding.  Ms. Kothari agrees with this plan of care.     Patient is a nonsmoker  The patient's Body mass index is 19.01 kg/m².. BMI is above normal parameters. Recommendations include: educational material and nutrition counseling    Diagnoses and all orders for this visit:    1. Lumbar disc herniation (Primary)    2. Lumbar radiculopathy    3. Non-smoker    4. Body mass index (BMI) of 19.0 to 19.9 in adult    Other orders  -     ketorolac (TORADOL) 10 MG tablet; Take 1 tablet by mouth Every 6 (Six) Hours As Needed for Moderate Pain .  Dispense: 20 tablet; Refill: 0          I discussed the  patients findings and my recommendations with patient    Fan Arevalo, APRN  06/30/21  16:14 CDT

## 2021-07-01 ENCOUNTER — TREATMENT (OUTPATIENT)
Dept: PHYSICAL THERAPY | Facility: CLINIC | Age: 36
End: 2021-07-01

## 2021-07-01 DIAGNOSIS — M79.18 LEFT BUTTOCK PAIN: ICD-10-CM

## 2021-07-01 DIAGNOSIS — M25.559 PAIN, HIP: Primary | ICD-10-CM

## 2021-07-01 PROCEDURE — 97140 MANUAL THERAPY 1/> REGIONS: CPT | Performed by: PHYSICAL THERAPIST

## 2021-07-01 NOTE — PROGRESS NOTES
Physical Therapy Treatment Note    Patient: Oneil Kothari                                                                                     Visit Date: 2021  :     1985    Referring practitioner:    NILE Rothman MD  Date of Initial Visit:          Type: THERAPY  Noted: 2021    Patient seen for 4 sessions    Visit Diagnoses:    ICD-10-CM ICD-9-CM   1. Pain, hip  M25.559 719.45   2. Left buttock pain  M79.18 729.1     SUBJECTIVE     Subjective She had an MRI which revealed a small disc HNP. She felt good when she left but as soon as she sat in her car, the pain returned. Prone is a good position for her.      PAIN: 6/10 when it hurts, 1/10 now         OBJECTIVE     Objective      Manual Therapy     53767  Comments   Prone left lower lumbar and lower thoracic PA mobs gentle   Prone left lower lumbar STM    Prone left LS man distraction Increased left leg pain   Supine LLE LAD sustained in loose packed position Used gait belt    Gentle left piriformis stretch Avoided flexing lumbar   Gentle neural flossing SLR with repetitive DF Felt some pull in lumbar but nothing after   Timed Minutes 45      Therapy Education/Self Care 75252   Details: Prone to CLAUDIO to rest every 2 hours  Try prone to CLAUDIO before getting OOB  Cat/cow  May have  try LAD is helps   Given Home Exercise Program  symptom relief   Progress: Modified   Education provided to:  Patient   Level of understanding Verbalized, Demonstrated and Teach back level of understanding   Timed Minutes 10         Total Timed Treatment:     45   mins  Total Time of Visit:             45   mins         ASSESSMENT/PLAN     GOALS  Goals                                          Progress Note due by 7/15/21   LTG by: 4 weeks Comments Date Status   No radicular symptoms for a week Radicular symptoms with sitting but not as bad as last week  ongoing   Able to progress with flexibility exercises with no exacerbation of symptoms Not ready for this  6/28 ongoing   ind with HEP for flexibility and core stability See above, not ready for stability 6/28 ongoing               Assessment/Plan     ASSESSMENT: Her MRI did reveal an HNP at left L5/S1 consistent with her symptoms. She is not ready to progress with her exercises but her pain was not as inflamed today. It will likely take several weeks to consistently centralize her pain and heal her disc. Her flat lumbar spine may have increased the stress to her lower lumbar.     PLAN: cont emphasis on mobs of her lumbar and gentle neural flossing. Progress again to more prone pressups if able to do without flare of pain.     Signature: Hunter Sharpe, PT

## 2021-07-06 ENCOUNTER — TREATMENT (OUTPATIENT)
Dept: PHYSICAL THERAPY | Facility: CLINIC | Age: 36
End: 2021-07-06

## 2021-07-06 DIAGNOSIS — M79.18 LEFT BUTTOCK PAIN: ICD-10-CM

## 2021-07-06 DIAGNOSIS — M25.559 PAIN, HIP: Primary | ICD-10-CM

## 2021-07-06 PROCEDURE — 97012 MECHANICAL TRACTION THERAPY: CPT | Performed by: PHYSICAL THERAPIST

## 2021-07-06 PROCEDURE — 97140 MANUAL THERAPY 1/> REGIONS: CPT | Performed by: PHYSICAL THERAPIST

## 2021-07-06 RX ORDER — DICLOFENAC SODIUM 75 MG/1
75 TABLET, DELAYED RELEASE ORAL 2 TIMES DAILY
Qty: 60 TABLET | Refills: 1 | Status: SHIPPED | OUTPATIENT
Start: 2021-07-06 | End: 2021-09-21

## 2021-07-06 NOTE — PROGRESS NOTES
Physical Therapy Treatment Note    Patient: Oneil Kothari                                                                                     Visit Date: 2021  :     1985    Referring practitioner:    NILE Rothman MD  Date of Initial Visit:          Type: THERAPY  Noted: 2021    Patient seen for 5 sessions    Visit Diagnoses:    ICD-10-CM ICD-9-CM   1. Pain, hip  M25.559 719.45   2. Left buttock pain  M79.18 729.1     SUBJECTIVE     Subjective She says she is only good with Tordol. She is struggling not lifting her babies at home. Her leg pain is constant.     PAIN: 6/10 when it hurts, 1/10 now         OBJECTIVE     Objective      Manual Therapy     [id number]  Comments   Prone left lower lumbar and lower thoracic PA mobs gentle   Prone left lower lumbar STM    Prone left LS man distraction Increased left leg pain               Timed Minutes 20      Mechanical Traction   [id number] Comments   Inversion table 75% full inversion, 2 min x 2 sets with rest between       Untimed Minutes 8       Therapy Education/Self Care 67960   Details: Prone to CLAUDIO to rest every 2 hours  Try prone to CLAUDIO before getting OOB  Cat/cow  May have  try LAD is helps   Given Home Exercise Program  symptom relief   Progress: Modified   Education provided to:  Patient   Level of understanding Verbalized, Demonstrated and Teach back level of understanding   Timed Minutes 10         Total Timed Treatment:     20   mins  Total Time of Visit:             28   mins         ASSESSMENT/PLAN     GOALS  Goals                                          Progress Note due by 7/15/21   LTG by: 4 weeks Comments Date Status   No radicular symptoms for a week Radicular symptoms with sitting but not as bad as last week  ongoing   Able to progress with flexibility exercises with no exacerbation of symptoms Not ready for this  ongoing   ind with HEP for flexibility and core stability See above, not ready for stability  ongoing                Assessment/Plan     ASSESSMENT: She says she hasn't been able to lie down every couple of hours to unload her back and promote healing but will try to do better, especially while at work. She is also struggling with protecting her back while at home. We tried traction today so hopefully that will be enough to open her back and draw the bulge back in.     PLAN: assess traction and continue if helps.     Signature: Hunter Sharpe, PT

## 2021-07-12 ENCOUNTER — TREATMENT (OUTPATIENT)
Dept: PHYSICAL THERAPY | Facility: CLINIC | Age: 36
End: 2021-07-12

## 2021-07-12 DIAGNOSIS — M79.18 LEFT BUTTOCK PAIN: ICD-10-CM

## 2021-07-12 DIAGNOSIS — M25.559 PAIN, HIP: Primary | ICD-10-CM

## 2021-07-12 PROCEDURE — 97140 MANUAL THERAPY 1/> REGIONS: CPT | Performed by: PHYSICAL THERAPIST

## 2021-07-12 NOTE — PROGRESS NOTES
Physical Therapy Treatment Note    Patient: Oneil Kothari                                                                                     Visit Date: 2021  :     1985    Referring practitioner:    NILE Rothman MD  Date of Initial Visit:          Type: THERAPY  Noted: 2021    Patient seen for 6 sessions    Visit Diagnoses:    ICD-10-CM ICD-9-CM   1. Pain, hip  M25.559 719.45   2. Left buttock pain  M79.18 729.1     SUBJECTIVE     Subjective She says she is about the same. She finished Tordal and started Vultarin. This isn't helping her pain as much. She is still having the radicular pain. She is doing better when she takes her breaks to rest her back. When she lies down, it takes about 15 minutes to get relief in her leg. She got her dad's inversion table and used it last night.     PAIN: 4-5/10 when it hurts, 1/10 now       OBJECTIVE     Objective      Manual Therapy     [id number]  Comments   Prone left lower lumbar and lower thoracic PA mobs gentle   Prone left lower lumbar STM    Prone left LS man distraction Increased left leg pain   Prone L5 PA mob on left tender   Prone left piriformis deep tissue release    Supine LAD left LE sustained    Passive left piriformis stretch    Supine SLR neural flossing with repetitive DF    Timed Minutes 45        Therapy Education/Self Care 53510   Details: Prone to CLAUDIO to rest every 2 hours  Try prone to CLAUDIO before getting OOB  Cat/cow  May have  try LAD is helps  Seated neural flossing with DF   Given Home Exercise Program  symptom relief   Progress: Modified   Education provided to:  Patient   Level of understanding Verbalized, Demonstrated and Teach back level of understanding   Timed Minutes          Total Timed Treatment:     45   mins  Total Time of Visit:             45   mins         ASSESSMENT/PLAN     GOALS  Goals                                          Progress Note due by 7/15/21   LTG by: 4 weeks Comments Date Status   No radicular  symptoms for a week Radicular symptoms with sitting but not as bad as last week 7/12 ongoing   Able to progress with flexibility exercises with no exacerbation of symptoms Not ready for this today but may try next visit 7/12 ongoing   ind with HEP for flexibility and core stability See above, not ready for stability 7/6 ongoing               Assessment/Plan     ASSESSMENT: She continues to have pain but is now being more diligent about protecting her back and resting more frequently instead of waiting for the pain. We are resisting the urge to be more aggressive with any stretching as this could set her back but she might do OK with gentle flexibility to get the pain out of her leg better.     PLAN: try gentle flexibility.     Signature: Hunter Sharpe, PT

## 2021-07-15 ENCOUNTER — TREATMENT (OUTPATIENT)
Dept: PHYSICAL THERAPY | Facility: CLINIC | Age: 36
End: 2021-07-15

## 2021-07-15 DIAGNOSIS — M25.559 PAIN, HIP: Primary | ICD-10-CM

## 2021-07-15 DIAGNOSIS — M54.16 LUMBAR RADICULOPATHY: ICD-10-CM

## 2021-07-15 DIAGNOSIS — M79.18 LEFT BUTTOCK PAIN: ICD-10-CM

## 2021-07-15 DIAGNOSIS — M51.26 LUMBAR DISC HERNIATION: Primary | ICD-10-CM

## 2021-07-15 PROCEDURE — 97140 MANUAL THERAPY 1/> REGIONS: CPT | Performed by: PHYSICAL THERAPIST

## 2021-07-15 RX ORDER — GABAPENTIN 100 MG/1
100 CAPSULE ORAL 3 TIMES DAILY
Qty: 90 CAPSULE | Refills: 2 | Status: SHIPPED | OUTPATIENT
Start: 2021-07-15 | End: 2021-09-21

## 2021-07-15 NOTE — PROGRESS NOTES
Physical Therapy Treatment Note    Patient: Oneil Kothari                                                                                     Visit Date: 7/15/2021  :     1985    Referring practitioner:    NILE Rothman MD  Date of Initial Visit:          Type: THERAPY  Noted: 2021    Patient seen for 7 sessions    Visit Diagnoses:    ICD-10-CM ICD-9-CM   1. Pain, hip  M25.559 719.45   2. Left buttock pain  M79.18 729.1     SUBJECTIVE     Subjective Her leg is still the same. The intensity is down but still radiating down her leg. She is taking care of her back more at work with setting an alarm to rest and get off her back. She took 4 breaks yesterday.     PAIN: 4/10 now       OBJECTIVE     Objective      Manual Therapy     [id number]  Comments   Prone left lower lumbar and lower thoracic PA mobs gentle   Prone left lower lumbar STM    Prone left LS man distraction Increased left leg pain   Prone L5 PA mob on left tender   Right sidelying left piriformis deep tissue release In FAIR position   Positional distraction left lower lumbar with rotation OP at L5/S1 Repetitive min to mod OP   Supine LLE LAD sustained        Timed Minutes 40        Therapy Education/Self Care 72680   Details: Prone to CLAUDIO to rest every 2 hours  Try prone to CLAUDIO before getting OOB  Cat/cow  May have  try LAD is helps  Add positional distraction with light OP   Given Home Exercise Program  symptom relief   Progress: Modified   Education provided to:  Patient   Level of understanding Verbalized, Demonstrated and Teach back level of understanding   Timed Minutes          Total Timed Treatment:     40   mins  Total Time of Visit:             40   mins         ASSESSMENT/PLAN     GOALS  Goals                                          Progress Note due by 7/15/21   LTG by: 4 weeks Comments Date Status   No radicular symptoms for a week Radicular symptoms with sitting but not as bad as last week 7/15 ongoing   Able to progress  with flexibility exercises with no exacerbation of symptoms Not ready for this today but may try next visit 7/12 ongoing   ind with HEP for flexibility and core stability See above, not ready for stability 7/6 ongoing               Assessment/Plan     ASSESSMENT: She is still struggling with radicular pain and has plateaued with what we have worked on at this point. Today, I introduced positional distraction to try to maneuver her disc to open the foramen and relieve pressure on the nerve. Hopefully the combination of this and the extension in lying will move the HNP off the nerve without flaring her pain.    PLAN: assess effectiveness of this new strategy and pursue if it doesn't flare her.     Signature: Hunter Sharpe, PT

## 2021-08-11 ENCOUNTER — TELEPHONE (OUTPATIENT)
Dept: NEUROSURGERY | Facility: CLINIC | Age: 36
End: 2021-08-11

## 2021-08-12 ENCOUNTER — OFFICE VISIT (OUTPATIENT)
Dept: NEUROSURGERY | Facility: CLINIC | Age: 36
End: 2021-08-12

## 2021-08-12 VITALS
SYSTOLIC BLOOD PRESSURE: 118 MMHG | HEIGHT: 68 IN | BODY MASS INDEX: 18.94 KG/M2 | WEIGHT: 125 LBS | DIASTOLIC BLOOD PRESSURE: 68 MMHG

## 2021-08-12 DIAGNOSIS — Z78.9 NON-SMOKER: ICD-10-CM

## 2021-08-12 DIAGNOSIS — M54.16 LUMBAR RADICULOPATHY: ICD-10-CM

## 2021-08-12 DIAGNOSIS — M51.26 LUMBAR DISC HERNIATION: Primary | ICD-10-CM

## 2021-08-12 PROCEDURE — 99213 OFFICE O/P EST LOW 20 MIN: CPT | Performed by: NURSE PRACTITIONER

## 2021-08-12 NOTE — PROGRESS NOTES
Chief complaint:   Chief Complaint   Patient presents with   • Back Pain     Oneil is returning for follow up for her back pain, she states that as long as she states on her medications she is able to tolerate the pain.  She tried the physical therapy that her PCP had ordered for her.         Subjective     HPI: This is a 35-year-old female patient who was referred to us by Dr. Albert Rothman for back and left leg pain.  She is here to be evaluated today patient says that her pain started back in February and was initially back pain but after a few days it is start going down into her left lower extremity.  There is no accident or injury associated with this.  She says that her back pain is more of a dull ache and stiffness but her biggest pain complaint starts in her left buttocks and goes down her leg in a lateral radicular fashion until it gets past the knee and then goes to the posterior calf region but does not go to the ankle.  Denies any numbness or tingling.  She denies any bowel or bladder incontinence.  She has done 3 sessions of therapy but before they continued they wanted imaging to make sure that there was nothing else going on from a spinal standpoint.  She has attempted chiropractic care without any significant improvement.  She has done 2 rounds of Medrol Dosepak without any significant improvement.  She has been working with her primary care doctor with medication.  She has not had any injections in her back.  Rates her pain on a scale 0-10 at a 6.  She says it does interfere with actives of daily living.  She is right-hand dominant.  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  She is .  Denies any tobacco or illicit drug use.  She does drink alcohol occasionally.    We did send the patient for dedicated course of physical therapy.  She is here to be evaluated today.  The patient has also been placed on gabapentin and diclofenac. She says as long she is taking  "the gabapentin and the diclofenac that her pain is under good control however she says that she has tried to come off of one or both of the medications and each time the pain came back. She did go through seven sessions of physical therapy but did not feel that she made any benefit in her pain from the therapy. Still is complaining of back pain but mostly pain going into her left lower extremity in a radicular fashion to her foot.    Review of Systems   Musculoskeletal: Positive for back pain.   Neurological: Positive for numbness.   Psychiatric/Behavioral: Negative.          Objective      Vital Signs  /68   Ht 172.7 cm (68\")   Wt 56.7 kg (125 lb)   BMI 19.01 kg/m²     Physical Exam  Constitutional:       Appearance: She is well-developed.   HENT:      Head: Normocephalic.   Eyes:      Extraocular Movements: EOM normal.      Pupils: Pupils are equal, round, and reactive to light.   Pulmonary:      Effort: Pulmonary effort is normal.   Musculoskeletal:         General: Normal range of motion.      Cervical back: Normal range of motion.      Comments: Back and left leg pain   Skin:     General: Skin is warm.   Neurological:      Mental Status: She is alert and oriented to person, place, and time.      GCS: GCS eye subscore is 4. GCS verbal subscore is 5. GCS motor subscore is 6.      Cranial Nerves: No cranial nerve deficit.      Sensory: No sensory deficit.      Gait: Gait is intact. Gait normal.      Deep Tendon Reflexes: Strength normal and reflexes are normal and symmetric.   Psychiatric:         Speech: Speech normal.         Behavior: Behavior normal.         Thought Content: Thought content normal.         Neurologic Exam     Mental Status   Oriented to person, place, and time.   Attention: normal. Concentration: normal.   Speech: speech is normal   Level of consciousness: alert  Normal comprehension.     Cranial Nerves     CN II   Visual fields full to confrontation.     CN III, IV, VI   Pupils are " equal, round, and reactive to light.  Extraocular motions are normal.     CN V   Facial sensation intact.     CN VII   Facial expression full, symmetric.     CN VIII   CN VIII normal.     CN IX, X   CN IX normal.   CN X normal.     CN XI   CN XI normal.     CN XII   CN XII normal.     Motor Exam   Muscle bulk: normal    Strength   Strength 5/5 throughout.     Sensory Exam   Light touch normal.     Gait, Coordination, and Reflexes     Gait  Gait: normal    Reflexes   Reflexes 2+ except as noted.       Imaging review:No new imaging*        Assessment/Plan: I am going to send the patient for an evaluation with Dr. CHARITY Lee to see about having injections done in her back. She can continue with the medication and then after she has an injection with him try and come off the medication and see if she has a lasting benefit. She was told to call us if any further problems or concerns. We will have her follow-up with Dr. Urbano at the next available appointment    Patient is a nonsmoker  The patient's Body mass index is 19.01 kg/m².. BMI is above normal parameters. Recommendations include: educational material and nutrition counseling    Diagnoses and all orders for this visit:    1. Lumbar disc herniation (Primary)  -     Ambulatory Referral to Pain Management    2. Lumbar radiculopathy  -     Ambulatory Referral to Pain Management    3. Non-smoker    4. Body mass index (BMI) of 19.0 to 19.9 in adult        I discussed the patients findings and my recommendations with patient  Fan JIAN Arevalo, RICKY  08/12/21  15:36 CDT

## 2021-09-21 ENCOUNTER — OFFICE VISIT (OUTPATIENT)
Dept: OBSTETRICS AND GYNECOLOGY | Facility: CLINIC | Age: 36
End: 2021-09-21

## 2021-09-21 VITALS
HEIGHT: 68 IN | BODY MASS INDEX: 18.79 KG/M2 | DIASTOLIC BLOOD PRESSURE: 82 MMHG | SYSTOLIC BLOOD PRESSURE: 124 MMHG | WEIGHT: 124 LBS

## 2021-09-21 DIAGNOSIS — Z97.5 IUD (INTRAUTERINE DEVICE) IN PLACE: ICD-10-CM

## 2021-09-21 DIAGNOSIS — Z01.419 ENCOUNTER FOR GYNECOLOGICAL EXAMINATION WITHOUT ABNORMAL FINDING: Primary | ICD-10-CM

## 2021-09-21 DIAGNOSIS — N90.89 SKIN TAG OF VULVA: ICD-10-CM

## 2021-09-21 PROCEDURE — 11200 RMVL SKIN TAGS UP TO&INC 15: CPT | Performed by: OBSTETRICS & GYNECOLOGY

## 2021-09-21 PROCEDURE — 99395 PREV VISIT EST AGE 18-39: CPT | Performed by: OBSTETRICS & GYNECOLOGY

## 2021-09-21 NOTE — PROGRESS NOTES
"CC: annual exam    SUBJECTIVE: Oneil Kothari is a 36 y.o. female , para 3, who comes to the office today for annual GYN examination. Her last Pap smear was 9/2020, and was normal and negative HPV. She has no history of cervical dysplasia. She currently has a Mirena IUD for contraception and is mostly amenorrheic. Her medical history is reviewed. She has a vulvar skin tag that she would like removed.    HPI      Social History     Tobacco Use   • Smoking status: Never Smoker   • Smokeless tobacco: Never Used   Vaping Use   • Vaping Use: Never used   Substance Use Topics   • Alcohol use: No   • Drug use: No        Review of Systems   Constitutional: Negative for fever.   Respiratory: Negative for cough.    Genitourinary: Negative for menstrual problem.   Hematological: Does not bruise/bleed easily.       Visit Vitals  /82 (BP Location: Left arm, Patient Position: Sitting)   Ht 172.7 cm (68\")   Wt 56.2 kg (124 lb)   BMI 18.85 kg/m²      Objective   Physical Exam  Vitals and nursing note reviewed. Exam conducted with a chaperone present.   Constitutional:       General: She is not in acute distress.     Appearance: She is well-developed.   HENT:      Head: Normocephalic and atraumatic.   Cardiovascular:      Rate and Rhythm: Normal rate and regular rhythm.      Heart sounds: No murmur heard.     Pulmonary:      Effort: Pulmonary effort is normal.      Breath sounds: Normal breath sounds.   Chest:      Breasts:         Right: No inverted nipple or mass.         Left: No inverted nipple or mass.   Abdominal:      General: There is no distension.      Palpations: Abdomen is soft.      Tenderness: There is no abdominal tenderness.   Genitourinary:     General: Normal vulva.      Exam position: Lithotomy position.      Labia:         Right: No tenderness or lesion.         Left: No tenderness or lesion.       Vagina: Normal. No vaginal discharge, tenderness or bleeding.      Cervix: No cervical motion tenderness, " discharge or friability.      Uterus: Normal.       Adnexa:         Right: No tenderness or fullness.          Left: No tenderness or fullness.        Comments: The IUD strings are present  Musculoskeletal:         General: Normal range of motion.      Cervical back: Normal range of motion and neck supple.   Skin:     General: Skin is warm and dry.   Neurological:      Mental Status: She is alert and oriented to person, place, and time.   Psychiatric:         Behavior: Behavior normal.         Judgment: Judgment normal.       There is a 2 to 3 mm skin tag just to the left of midline on the lower mons pubis above the clitoral james.  She has requested to have it removed.  The skin was prepped with Betadine and then the base of the skin tag infiltrated with 1% lidocaine.  The skin tag was then sharply excised with Metzenbaum scissors and the excision site made hemostatic with a silver nitrate stick.  She tolerated the procedure well.      Assessment/Plan   Diagnoses and all orders for this visit:    1. Encounter for gynecological examination without abnormal finding (Primary)    2. Mirena IUD in place    3. Skin tag of vulva      We have discussed current Pap smear screening guidelines.  She will return in one year. In the meantime if she develops questions or problems, she will notify the office.

## 2021-11-16 DIAGNOSIS — K42.9 UMBILICAL HERNIA WITHOUT OBSTRUCTION AND WITHOUT GANGRENE: Primary | ICD-10-CM

## 2021-12-07 ENCOUNTER — TREATMENT (OUTPATIENT)
Dept: PHYSICAL THERAPY | Facility: CLINIC | Age: 36
End: 2021-12-07

## 2021-12-07 DIAGNOSIS — L90.5 AXILLARY WEB SYNDROME: Primary | ICD-10-CM

## 2021-12-07 DIAGNOSIS — L76.82 AXILLARY WEB SYNDROME: Primary | ICD-10-CM

## 2021-12-07 PROCEDURE — 97140 MANUAL THERAPY 1/> REGIONS: CPT | Performed by: PHYSICAL THERAPIST

## 2021-12-07 PROCEDURE — 97161 PT EVAL LOW COMPLEX 20 MIN: CPT | Performed by: PHYSICAL THERAPIST

## 2021-12-07 NOTE — PROGRESS NOTES
Physical Therapy Initial Evaluation and Plan of Care    Patient: Oneil Kothari             : 1985  Today's Date: 12/15/2021  Referring practitioner: No ref. provider found  Date of Initial Visit: 2021  Patient seen for 1 sessions    Visit Diagnoses:    ICD-10-CM ICD-9-CM   1. Axillary web syndrome  L76.82 998.89    L90.5 709.2     Past Medical History:   Diagnosis Date   • Abnormal Pap smear of cervix    • Gestational hypertension    • Low back pain      Past Surgical History:   Procedure Laterality Date   • CERVICAL BIOPSY  W/ LOOP ELECTRODE EXCISION     • WISDOM TOOTH EXTRACTION             OBJECTIVE     Objective   Manual Therapy     92160  Comments    I did IASTM along the ventral wrist and forearm, then along the cords in the upper arm as well. Did some repetitive stretching to each joint with increasing tension given. I did some MLD along the inner upper arm into the L axilla. Bending of the cords using dycem as well along the forearm. Then I assisted her L shoulder into full flexion, applied force at the base of the largest part of the cording with the base of the palm of my hand and wrapped my other hand around the arm just proximal to her elbow. Just with the pressure put through the axilla, a few cords broke. With skin stretch given from the hand proximal to the elbow, multiple cord tears were palpable and audible. Then with over pressure given to maintain the skin stretch and force the shoulder into a bit more flexion, countless snaps were heard.                    Timed Minutes 30                     Therapy Education/Self Care 15910   Details: Use of the chip bag.    Given symptom relief   Progress: New   Education provided to:  Patient   Level of understanding Verbalized   Timed Minutes          Total Timed Treatment:     30   mins  Total Time of Visit:            50   mins    ASSESSMENT/PLAN     Goals                                           Progress Note due by 1/6/2022                                                      Recert due by 3/7/2022    Comments Date Status   No visible or palpable cording remaining in the LUE.      Full ROM and normal use of LUE without pain or tightness.                                                               Assessment & Plan     Assessment    Assessment details: Oneil had breast augmentation 3 weeks ago and has developed cording. It is quite robust in the axilla and she does have cords that go all the way down the length of the arm. She has been working on breaking them and has been able to do so but they keep coming back. The axillary cord looks to be a large number of smaller cords that are intertwined together causing the tightness to be quite severe. It fluctuates.   Prognosis: good    Plan  Therapy options: will be seen for skilled therapy services  Planned modality interventions: low level laser therapy and dry needling  Planned therapy interventions: manual therapy, soft tissue mobilization, stretching, home exercise program, functional ROM exercises and flexibility  Frequency: 2x week  Duration in weeks: 4  Treatment plan discussed with: patient  Plan details: Will see her 1-2X/wk until cording resolves.           SIGNATURE: Thu Schmitt, PT, DPT, CLT-SCOTTY, License #: [id number]  Electronically Signed on 12/15/2021    Initial Certification  Certification Period: 12/15/2021 through 3/14/2022  I certify that the therapy services are furnished while this patient is under my care.  The services outlined above are required by this patient, and will be reviewed every 90 days.     PHYSICIAN:   __________________________________DATE: ________    Please sign and return via fax to [phone].   Thank you so much for letting us work with Oneil. I appreciate your letting us work with your patients. If you have any questions or concerns, please don't hesitate to contact me.          [place] [address]  [phone]

## 2021-12-10 ENCOUNTER — TREATMENT (OUTPATIENT)
Dept: PHYSICAL THERAPY | Facility: CLINIC | Age: 36
End: 2021-12-10

## 2021-12-10 DIAGNOSIS — L76.82 AXILLARY WEB SYNDROME: Primary | ICD-10-CM

## 2021-12-10 DIAGNOSIS — L90.5 AXILLARY WEB SYNDROME: Primary | ICD-10-CM

## 2021-12-10 PROCEDURE — 97014 ELECTRIC STIMULATION THERAPY: CPT | Performed by: PHYSICAL THERAPIST

## 2021-12-10 PROCEDURE — 97140 MANUAL THERAPY 1/> REGIONS: CPT | Performed by: PHYSICAL THERAPIST

## 2021-12-10 NOTE — PROGRESS NOTES
Physical Therapy Treatment Note    Patient: Oneil Kothari                                                                     Visit Date: 12/10/2021  :     1985    Referring practitioner:    NILE Rothman MD  Date of Initial Visit:          Type: THERAPY  Noted: 2021    Patient seen for 2 sessions    Visit Diagnoses:    ICD-10-CM ICD-9-CM   1. Axillary web syndrome  L76.82 998.89    L90.5 709.2     SUBJECTIVE     Subjective She states her arm tightens up after the cords have been stretched.  She can feel the cords from her axilla to the wrist, a little at times in the L thumb    PAIN: 0/10         OBJECTIVE     Objective     Manual Therapy     [id number]  Comments   Gently stretched the cords with Dycem on the L and R, spending more time on the L.    MLD:  Short neck, L axilla, L groin, L AI, and L UE    Used Kinesio tape over L axilla  One large I strip proximal axilla and proximal arm, 4 small I strips overlapping each other in a star pattern.           Timed Minutes 50        Modalities Comments   cold laser/electrical stimulation Inflammation setting, 5 min L forearm, 5 min bicep       Minutes 10       Objective   Therapy Education/Self Care 95938   Details: Use of the chip bag, continue to work on stretching   Given symptom relief   Progress: Reinforced   Education provided to:  Patient   Level of understanding Verbalized   Timed Minutes              Total Timed Treatment:     60   mins  Total Time of Visit:            60   mins     ASSESSMENT/PLAN            Goals                                          Progress Note due by 2022                                                      Recert due by 3/7/2022     Comments Date Status   No visible or palpable cording remaining in the LUE.  Cording still present 12/10/21 Ongoing   Full ROM and normal use of LUE without pain or tightness.  She has full ROM, but has tightness and pain  12/10/21 Ongoing                                                                                                    Assessment/Plan     ASSESSMENT: Oneil did experience increase tightness after the initial eval, she has minor bruising on the L arm from stretching the cords.  Today I worked on using the LaserStim combo on the cording L forearm and L bicep area to see if this would help the inflammation from the cording. I also did MLD to help move the fluid and flush out toxins in the L arm.  The tape will help to left the tissue in the axilla.  We tried several different techniques today, will see how patient tolerated the treatment.  Will most likely be more aggressive with stretching the cording next treatment.      PLAN: Assess effectiveness of today's treatment, will work more on the cording and will tape again if this helped over the weekend.     SIGNATURE: Megan Pittman PTA, TAWANA-SCOTTY, License #: [id number]  Electronically Signed on 12/10/2021        [address] [place] [address]. [address]  [phone]   2 to 4 times a month

## 2021-12-13 ENCOUNTER — TREATMENT (OUTPATIENT)
Dept: PHYSICAL THERAPY | Facility: CLINIC | Age: 36
End: 2021-12-13

## 2021-12-13 DIAGNOSIS — L76.82 AXILLARY WEB SYNDROME: Primary | ICD-10-CM

## 2021-12-13 DIAGNOSIS — L90.5 AXILLARY WEB SYNDROME: Primary | ICD-10-CM

## 2021-12-13 PROCEDURE — 97140 MANUAL THERAPY 1/> REGIONS: CPT | Performed by: PHYSICAL THERAPIST

## 2021-12-13 NOTE — PROGRESS NOTES
Physical Therapy Treatment Note    Patient: Oneil Kothari                                                                     Visit Date: 2021  :     1985    Referring practitioner:    NILE Rothman MD  Date of Initial Visit:          Type: THERAPY  Noted: 2021    Patient seen for 3 sessions    Visit Diagnoses:    ICD-10-CM ICD-9-CM   1. Axillary web syndrome  L76.82 998.89    L90.5 709.2     SUBJECTIVE     Subjective   Mild pulling L proximal to the elbow.  She does have tightness in the distal arm.  She did pop a cord in the forearm yesterday.  She feels like the cording in the L forearm starting at the cubital fossa is worse.     PAIN: 4-5/10 with stretching.         OBJECTIVE     Objective     Manual Therapy     03595  Comments   Gently stretched the cords with Dycem on the L and R, spending more time on the L.    MLD:  Short neck, B axilla, B groin, B AI,  L UE    Used Kinesio tape over L axilla and L forearm. One large I strip proximal axilla and proximal arm, two sets of  3-4 small I strips overlapping each other in a star pattern at the axilla and one set at the cubital fossa   Check into a sports long sleeve compression shirt        Timed Minutes 60            Objective   Therapy Education/Self Care 46675   Details: Remove Kinesio tape before next visit.  Work on stretching the L arm in a doorway   Given symptom relief   Progress: Reinforced   Education provided to:  Patient   Level of understanding Verbalized   Timed Minutes              Total Timed Treatment:     60   mins  Total Time of Visit:            60   mins     ASSESSMENT/PLAN            Goals                                          Progress Note due by 2022                                                      Recert due by 3/7/2022     Comments Date Status   No visible or palpable cording remaining in the LUE.  Cording still present 12/10/21 Ongoing    Full ROM and normal use of LUE without pain or tightness.  She has full ROM, but has tightness and pain.  She has more tightness in distal arm today 12/13/21 Ongoing                                                                                                    Assessment/Plan     ASSESSMENT: Oneil was able to pop one of the cords yesterday in the distal arm.  She did not have as much tightness in the upper arm so today I used Kinesio tape on both the upper arm and the forearm.  Oneil is having more tightness in the forearm today.  The cording in less thick today in the axilla and it loosening.  Today when doing the MLD after stretching a cord did snap in the upper arm, she also had another snap of a cord later in the treatment.      PLAN: Assess effectiveness of today's treatment, will work more on the cording and will tape next treatment.     SIGNATURE: Megan Pittman PTA, FARZANEH, License #: [id number]  Electronically Signed on 12/13/2021        [address] [place] [address]. [address]  [phone]

## 2021-12-15 ENCOUNTER — HOSPITAL ENCOUNTER (OUTPATIENT)
Facility: HOSPITAL | Age: 36
Setting detail: HOSPITAL OUTPATIENT SURGERY
End: 2021-12-15
Attending: SPECIALIST | Admitting: SPECIALIST

## 2021-12-17 ENCOUNTER — TREATMENT (OUTPATIENT)
Dept: PHYSICAL THERAPY | Facility: CLINIC | Age: 36
End: 2021-12-17

## 2021-12-17 DIAGNOSIS — M25.559 PAIN, HIP: ICD-10-CM

## 2021-12-17 DIAGNOSIS — L76.82 AXILLARY WEB SYNDROME: Primary | ICD-10-CM

## 2021-12-17 DIAGNOSIS — L90.5 AXILLARY WEB SYNDROME: Primary | ICD-10-CM

## 2021-12-17 PROCEDURE — 97014 ELECTRIC STIMULATION THERAPY: CPT | Performed by: PHYSICAL THERAPIST

## 2021-12-17 PROCEDURE — 97140 MANUAL THERAPY 1/> REGIONS: CPT | Performed by: PHYSICAL THERAPIST

## 2021-12-17 NOTE — PROGRESS NOTES
Physical Therapy Treatment Note    Patient: Oneil Kothari                                                                     Visit Date: 2021  :     1985    Referring practitioner:    NILE Rothman MD  Date of Initial Visit:          Type: THERAPY  Noted: 2021    Patient seen for 4 sessions    Visit Diagnoses:    ICD-10-CM ICD-9-CM   1. Axillary web syndrome  L76.82 998.89    L90.5 709.2   2. Pain, hip  M25.559 719.45     SUBJECTIVE     Subjective   She states overall she is better and is not having pain in the L upper arm.  She still has tightness in the L forearm to the thumb.  She tightens up quickly after her treatments.  She did not see a bit difference in the cording with taping the distal arm.     PAIN: 2/10 with stretching.  After treatment patient feels just a stretch at end range       OBJECTIVE     Objective     Modalities Comments   cold laser/electrical stimulation chronic inflammation mode L forearm and L upper arm.        Minutes 10       Manual Therapy     [id number]  Comments   Gently stretched the cords with Dycem on the L UE .Extra time working on more distal cording.  She can have her  and her friend work on this area as well.    MLD:  Short neck, B axilla, B groin, B AI,  L UE    .            Timed Minutes 50                   Therapy Education/Self Care 55805   Details: Have family/friends work on stretching distal cording.  Continue with stretching.   Given symptom relief   Progress: Reinforced   Education provided to:  Patient   Level of understanding Verbalized   Timed Minutes              Total Timed Treatment:     60   mins  Total Time of Visit:            60   mins     ASSESSMENT/PLAN            Goals                                          Progress Note due by 2022                                                      Recert due by 3/7/2022     Comments Date Status   No visible or palpable  cording remaining in the LUE.  Cording is spreading out and loosening 12/17/21 Ongoing   Full ROM and normal use of LUE without pain or tightness.  She has full ROM, but has tightness and pain.  She has more tightness in distal arm today 12/13/21 Ongoing                                                                                                    Assessment/Plan     ASSESSMENT: Oneil's cording L axilla is less tight and is starting to spread out as it loosens.  She could not tell any difference with the Kinesio tape to the distal arm so I did not use it today.  I did use the LaserStim to the L upper and lower arm.  I did not snap any of the cords today during treatment.  I am please with patient's progress so far since the arm is not as tight and the cording is less tight.  Oneil will work on stretching more throughout the day.  She does feel like the arm tightness quickly after her treatments.     PLAN: Assess effectiveness of today's treatment, will continue to work on cording as well as stretching the L shoulder.    SIGNATURE: Megan Pittman PTA, [employer], License #: [id number]  Electronically Signed on 12/17/2021        [address] [place] [address]. [address]  [phone]

## 2021-12-21 ENCOUNTER — TREATMENT (OUTPATIENT)
Dept: PHYSICAL THERAPY | Facility: CLINIC | Age: 36
End: 2021-12-21

## 2021-12-21 DIAGNOSIS — L76.82 AXILLARY WEB SYNDROME: Primary | ICD-10-CM

## 2021-12-21 DIAGNOSIS — L90.5 AXILLARY WEB SYNDROME: Primary | ICD-10-CM

## 2021-12-21 PROCEDURE — 97140 MANUAL THERAPY 1/> REGIONS: CPT | Performed by: PHYSICAL THERAPIST

## 2021-12-21 PROCEDURE — 97014 ELECTRIC STIMULATION THERAPY: CPT | Performed by: PHYSICAL THERAPIST

## 2021-12-21 NOTE — PROGRESS NOTES
Physical Therapy Treatment Note    Patient: Oneil Kothari                                                                     Visit Date: 2021  :     1985    Referring practitioner:    No ref. provider found  Date of Initial Visit:          Type: THERAPY  Noted: 2021    Patient seen for 5 sessions    Visit Diagnoses:    ICD-10-CM ICD-9-CM   1. Axillary web syndrome  L76.82 998.89    L90.5 709.2     SUBJECTIVE     Subjective   Oneil states she could tell a difference in her arm Saturday, the arm is staying stretched out more and the cording is less and spread out.  She is still having distal symptoms from the cording, she is pleased with her progress so far.     PAIN: 1/10 with stretching.         OBJECTIVE     Objective     Modalities Comments   cold laser/electrical stimulation chronic inflammation mode L forearm and L upper arm.        Minutes 10       Manual Therapy     [id number]  Comments   Gently stretched the cords with Dycem on the L UE with an S stretch.  Also did the S stretch without the Dycem.  .Extra time working on more distal cording.     MLD:  Short neck, deep abdominals with diaphragmatic breathing, B axilla, B groin, B AI,  L UE    .            Timed Minutes 50                 Therapy Education/Self Care 10805   Details: Have family/friends work on stretching distal cording.  Continue with stretching.   Given symptom relief   Progress: Reinforced   Education provided to:  Patient   Level of understanding Verbalized   Timed Minutes              Total Timed Treatment:     60   mins  Total Time of Visit:            60   mins     ASSESSMENT/PLAN            Goals                                          Progress Note due by 2022                                                      Recert due by 3/7/2022     Comments Date Status   No visible or palpable cording remaining in the LUE.  Cording continues to spread out and  loosen 12/21/21 Progressing   Full ROM and normal use of LUE without pain or tightness.  She is having less tightness with full ROM.  12/21/21 Progressing                           Assessment/Plan     ASSESSMENT: Oneil's cording L axilla continues to spread out and loosen even more than last treatment.  The upper arm has visibly less cores and palpably less cords.  She still has discomfort with stretching at end range shoulder flexion at the wrist, especially with wrist extension.  Oneil is pleased with her progress so far, she did see her surgeon he told patient the cords would resolve.  Patient will return in two weeks for a follow up visit.  Will determine if patient will need further visits.     PLAN: Assess effectiveness of today's treatment, will continue to work on cording as well as stretching the L shoulder.  Assess needs for further visits.     SIGNATURE: Megan Pittman PTA, [employer], License #: [id number]  Electronically Signed on 12/21/2021        [address] [place] [address]. [address]  [phone]

## 2021-12-22 ENCOUNTER — TRANSCRIBE ORDERS (OUTPATIENT)
Dept: LAB | Facility: HOSPITAL | Age: 36
End: 2021-12-22

## 2021-12-22 DIAGNOSIS — Z11.59 SCREENING FOR VIRAL DISEASE: Primary | ICD-10-CM

## 2021-12-28 ENCOUNTER — LAB (OUTPATIENT)
Dept: LAB | Facility: HOSPITAL | Age: 36
End: 2021-12-28

## 2021-12-28 ENCOUNTER — PRE-ADMISSION TESTING (OUTPATIENT)
Dept: PREADMISSION TESTING | Facility: HOSPITAL | Age: 36
End: 2021-12-28

## 2021-12-28 VITALS
RESPIRATION RATE: 18 BRPM | BODY MASS INDEX: 19.59 KG/M2 | WEIGHT: 132.28 LBS | HEART RATE: 66 BPM | HEIGHT: 69 IN | DIASTOLIC BLOOD PRESSURE: 68 MMHG | OXYGEN SATURATION: 100 % | SYSTOLIC BLOOD PRESSURE: 115 MMHG

## 2021-12-28 LAB
ALBUMIN SERPL-MCNC: 4.7 G/DL (ref 3.5–5.2)
ALBUMIN/GLOB SERPL: 1.9 G/DL
ALP SERPL-CCNC: 60 U/L (ref 39–117)
ALT SERPL W P-5'-P-CCNC: 11 U/L (ref 1–33)
ANION GAP SERPL CALCULATED.3IONS-SCNC: 7 MMOL/L (ref 5–15)
AST SERPL-CCNC: 21 U/L (ref 1–32)
BASOPHILS # BLD AUTO: 0.03 10*3/MM3 (ref 0–0.2)
BASOPHILS NFR BLD AUTO: 0.9 % (ref 0–1.5)
BILIRUB SERPL-MCNC: 0.3 MG/DL (ref 0–1.2)
BUN SERPL-MCNC: 17 MG/DL (ref 6–20)
BUN/CREAT SERPL: 22.7 (ref 7–25)
CALCIUM SPEC-SCNC: 9.6 MG/DL (ref 8.6–10.5)
CHLORIDE SERPL-SCNC: 106 MMOL/L (ref 98–107)
CO2 SERPL-SCNC: 29 MMOL/L (ref 22–29)
CREAT SERPL-MCNC: 0.75 MG/DL (ref 0.57–1)
DEPRECATED RDW RBC AUTO: 44.9 FL (ref 37–54)
EOSINOPHIL # BLD AUTO: 0.06 10*3/MM3 (ref 0–0.4)
EOSINOPHIL NFR BLD AUTO: 1.8 % (ref 0.3–6.2)
ERYTHROCYTE [DISTWIDTH] IN BLOOD BY AUTOMATED COUNT: 13.8 % (ref 12.3–15.4)
GFR SERPL CREATININE-BSD FRML MDRD: 87 ML/MIN/1.73
GLOBULIN UR ELPH-MCNC: 2.5 GM/DL
GLUCOSE SERPL-MCNC: 74 MG/DL (ref 65–99)
HCT VFR BLD AUTO: 42.4 % (ref 34–46.6)
HGB BLD-MCNC: 13.4 G/DL (ref 12–15.9)
IMM GRANULOCYTES # BLD AUTO: 0 10*3/MM3 (ref 0–0.05)
IMM GRANULOCYTES NFR BLD AUTO: 0 % (ref 0–0.5)
LYMPHOCYTES # BLD AUTO: 1.41 10*3/MM3 (ref 0.7–3.1)
LYMPHOCYTES NFR BLD AUTO: 41.6 % (ref 19.6–45.3)
MCH RBC QN AUTO: 28 PG (ref 26.6–33)
MCHC RBC AUTO-ENTMCNC: 31.6 G/DL (ref 31.5–35.7)
MCV RBC AUTO: 88.7 FL (ref 79–97)
MONOCYTES # BLD AUTO: 0.3 10*3/MM3 (ref 0.1–0.9)
MONOCYTES NFR BLD AUTO: 8.8 % (ref 5–12)
NEUTROPHILS NFR BLD AUTO: 1.59 10*3/MM3 (ref 1.7–7)
NEUTROPHILS NFR BLD AUTO: 46.9 % (ref 42.7–76)
NRBC BLD AUTO-RTO: 0 /100 WBC (ref 0–0.2)
PLATELET # BLD AUTO: 232 10*3/MM3 (ref 140–450)
PMV BLD AUTO: 9.6 FL (ref 6–12)
POTASSIUM SERPL-SCNC: 3.9 MMOL/L (ref 3.5–5.2)
PROT SERPL-MCNC: 7.2 G/DL (ref 6–8.5)
RBC # BLD AUTO: 4.78 10*6/MM3 (ref 3.77–5.28)
SARS-COV-2 ORF1AB RESP QL NAA+PROBE: NOT DETECTED
SODIUM SERPL-SCNC: 142 MMOL/L (ref 136–145)
WBC NRBC COR # BLD: 3.39 10*3/MM3 (ref 3.4–10.8)

## 2021-12-28 PROCEDURE — 85025 COMPLETE CBC W/AUTO DIFF WBC: CPT

## 2021-12-28 PROCEDURE — 80053 COMPREHEN METABOLIC PANEL: CPT

## 2021-12-28 PROCEDURE — U0004 COV-19 TEST NON-CDC HGH THRU: HCPCS | Performed by: SPECIALIST

## 2021-12-28 PROCEDURE — C9803 HOPD COVID-19 SPEC COLLECT: HCPCS | Performed by: SPECIALIST

## 2021-12-28 PROCEDURE — 36415 COLL VENOUS BLD VENIPUNCTURE: CPT

## 2021-12-28 NOTE — DISCHARGE INSTRUCTIONS
DAY OF SURGERY INSTRUCTIONS          ARRIVAL TIME: AS DIRECTED BY OFFICE    YOU MAY TAKE THE FOLLOWING MEDICATION(S) THE MORNING OF SURGERY WITH A SIP OF WATER: none       ALL OTHER HOME MEDICATION CHECK WITH YOUR PHYSICIAN                            MANAGING PAIN AFTER SURGERY    We know you are probably wondering what your pain will be like after surgery.  Following surgery it is unrealistic to expect you will not have pain.   Pain is how our bodies let us know that something is wrong or cautions us to be careful.  That said, our goal is to make your pain tolerable.    Methods we may use to treat your pain include (oral or IV medications, PCAs, epidurals, nerve blocks, etc.)   While some procedures require IV pain medications for a short time after surgery, transitioning to pain medications by mouth allows for better management of pain.   Your nurse will encourage you to take oral pain medications whenever possible.  IV medications work almost immediately, but only last a short while.  Taking medications by mouth allows for a more constant level of medication in your blood stream for a longer period of time.      Once your pain is out of control it is harder to get back under control.  It is important you are aware when your next dose of pain medication is due.  If you are admitted, your nurse may write the time of your next dose on the white board in your room to help you remember.      We are interested in your pain and encourage you to inform us about aggravating factors during your visit.   Many times a simple repositioning every few hours can make a big difference.    If your physician says it is okay, do not let your pain prevent you from getting out of bed. Be sure to call your nurse for assistance prior to getting up so you do not fall.      Before surgery, please decide your tolerable pain goal.  These faces help describe the pain ratings we use on a 0-10 scale.   Be prepared to tell us your goal and  whether or not you take pain or anxiety medications at home.          BEFORE YOU COME TO THE HOSPITAL  (Pre-op instructions)  • Do not eat, drink, smoke or chew gum after midnight the night before surgery.  This also includes no mints.  • Morning of surgery take only the medicines you have been instructed with a sip of water unless otherwise instructed  by your physician.  • Do not shave, wear makeup or dark nail polish.  • Remove all jewelry including rings.  • Leave anything you consider valuable at home.  • Leave your suitcase in the car until after your surgery.  • Bring the following with you if applicable:  o Picture ID and insurance, Medicare or Medicaid cards  o Co-pay/deductible required by insurance (cash, check, credit card)  o Copy of advance directive, living will or power-of- documents if not brought to pre-work  o CPAP or BIPAP mask and tubing  o Relaxation aids ( book, magazine), etc.  o Hearing aids                        ON THE DAY OF SURGERY  · On the day of surgery check in at registration located at the main entrance of the hospital. Only one family member or friend are allowed per patient.  ? You will be registered and given a beeper with instructions where to wait in the main lobby.  ? When your beeper lights up and vibrates a member of the Outpatient Surgery staff will meet you at the double doors under the stair steps and escort you to your preoperative room.   · You may have cloth compression devices placed on your legs. These help to prevent blood clots and reduce swelling in your legs.  · An IV may be inserted into one of your veins.  · In the operating room, you may be given one or more of the following:  ? A medicine to help you relax (sedative).  ? A medicine to numb the area (local anesthetic).  ? A medicine to make you fall asleep (general anesthetic).  ? A medicine that is injected into an area of your body to numb everything below the injection site (regional  "anesthetic).  · Your surgical site will be marked or identified.  · You may be given an antibiotic through your IV to help prevent infection.  Contact a health care provider if you:  · Develop a fever of more than 100.4°F (38°C) or other feelings of illness during the 48 hours before your surgery.  · Have symptoms that get worse.  Have questions or concerns about your surgery    General Anesthesia/Surgery, Adult  General anesthesia is the use of medicines to make a person \"go to sleep\" (unconscious) for a medical procedure. General anesthesia must be used for certain procedures, and is often recommended for procedures that:  · Last a long time.  · Require you to be still or in an unusual position.  · Are major and can cause blood loss.  The medicines used for general anesthesia are called general anesthetics. As well as making you unconscious for a certain amount of time, these medicines:  · Prevent pain.  · Control your blood pressure.  · Relax your muscles.  Tell a health care provider about:  · Any allergies you have.  · All medicines you are taking, including vitamins, herbs, eye drops, creams, and over-the-counter medicines.  · Any problems you or family members have had with anesthetic medicines.  · Types of anesthetics you have had in the past.  · Any blood disorders you have.  · Any surgeries you have had.  · Any medical conditions you have.  · Any recent upper respiratory, chest, or ear infections.  · Any history of:  ? Heart or lung conditions, such as heart failure, sleep apnea, asthma, or chronic obstructive pulmonary disease (COPD).  ?  service.  ? Depression or anxiety.  · Any tobacco or drug use, including marijuana or alcohol use.  · Whether you are pregnant or may be pregnant.  What are the risks?  Generally, this is a safe procedure. However, problems may occur, including:  · Allergic reaction.  · Lung and heart problems.  · Inhaling food or liquid from the stomach into the lungs " (aspiration).  · Nerve injury.  · Air in the bloodstream, which can lead to stroke.  · Extreme agitation or confusion (delirium) when you wake up from the anesthetic.  · Waking up during your procedure and being unable to move. This is rare.  These problems are more likely to develop if you are having a major surgery or if you have an advanced or serious medical condition. You can prevent some of these complications by answering all of your health care provider's questions thoroughly and by following all instructions before your procedure.  General anesthesia can cause side effects, including:  · Nausea or vomiting.  · A sore throat from the breathing tube.  · Hoarseness.  · Wheezing or coughing.  · Shaking chills.  · Tiredness.  · Body aches.  · Anxiety.  · Sleepiness or drowsiness.  · Confusion or agitation.  RISKS AND COMPLICATIONS OF SURGERY  Your health care provider will discuss possible risks and complications with you before surgery. Common risks and complications include:    · Problems due to the use of anesthetics.  · Blood loss and replacement (does not apply to minor surgical procedures).  · Temporary increase in pain due to surgery.  · Uncorrected pain or problems that the surgery was meant to correct.  · Infection.  · New damage.    What happens before the procedure?    Medicines  Ask your health care provider about:  · Changing or stopping your regular medicines. This is especially important if you are taking diabetes medicines or blood thinners.  · Taking medicines such as aspirin and ibuprofen. These medicines can thin your blood. Do not take these medicines unless your health care provider tells you to take them.  · Taking over-the-counter medicines, vitamins, herbs, and supplements. Do not take these during the week before your procedure unless your health care provider approves them.  General instructions  · Starting 3-6 weeks before the procedure, do not use any products that contain nicotine or  tobacco, such as cigarettes and e-cigarettes. If you need help quitting, ask your health care provider.  · If you brush your teeth on the morning of the procedure, make sure to spit out all of the toothpaste.  · Tell your health care provider if you become ill or develop a cold, cough, or fever.  · If instructed by your health care provider, bring your sleep apnea device with you on the day of your surgery (if applicable).  · Ask your health care provider if you will be going home the same day, the following day, or after a longer hospital stay.  ? Plan to have someone take you home from the hospital or clinic.  ? Plan to have a responsible adult care for you for at least 24 hours after you leave the hospital or clinic. This is important.  What happens during the procedure?  · You will be given anesthetics through both of the following:  ? A mask placed over your nose and mouth.  ? An IV in one of your veins.  · You may receive a medicine to help you relax (sedative).  · After you are unconscious, a breathing tube may be inserted down your throat to help you breathe. This will be removed before you wake up.  · An anesthesia specialist will stay with you throughout your procedure. He or she will:  ? Keep you comfortable and safe by continuing to give you medicines and adjusting the amount of medicine that you get.  ? Monitor your blood pressure, pulse, and oxygen levels to make sure that the anesthetics do not cause any problems.  The procedure may vary among health care providers and hospitals.  What happens after the procedure?  · Your blood pressure, temperature, heart rate, breathing rate, and blood oxygen level will be monitored until the medicines you were given have worn off.  · You will wake up in a recovery area. You may wake up slowly.  · If you feel anxious or agitated, you may be given medicine to help you calm down.  · If you will be going home the same day, your health care provider may check to make  sure you can walk, drink, and urinate.  · Your health care provider will treat any pain or side effects you have before you go home.  · Do not drive for 24 hours if you were given a sedative.  Summary  · General anesthesia is used to keep you still and prevent pain during a procedure.  · It is important to tell your healthcare provider about your medical history and any surgeries you have had, and previous experience with anesthesia.  · Follow your healthcare provider’s instructions about when to stop eating, drinking, or taking certain medicines before your procedure.  · Plan to have someone take you home from the hospital or clinic.  This information is not intended to replace advice given to you by your health care provider. Make sure you discuss any questions you have with your health care provider.  Document Released: 03/26/2009 Document Revised: 08/03/2018 Document Reviewed: 08/03/2018  [employer] Interactive Patient Education © 2019 [employer]       Fall Prevention in Hospitals, Adult  As a hospital patient, your condition and the treatments you receive can increase your risk for falls. Some additional risk factors for falls in a hospital include:  · Being in an unfamiliar environment.  · Being on bed rest.  · Your surgery.  · Taking certain medicines.  · Your tubing requirements, such as intravenous (IV) therapy or catheters.  It is important that you learn how to decrease fall risks while at the hospital. Below are important tips that can help prevent falls.  SAFETY TIPS FOR PREVENTING FALLS  Talk about your risk of falling.  · Ask your health care provider why you are at risk for falling. Is it your medicine, illness, tubing placement, or something else?  · Make a plan with your health care provider to keep you safe from falls.  · Ask your health care provider or pharmacist about side effects of your medicines. Some medicines can make you dizzy or affect your coordination.  Ask for help.  · Ask for help  before getting out of bed. You may need to press your call button.  · Ask for assistance in getting safely to the toilet.  · Ask for a walker or cane to be put at your bedside. Ask that most of the side rails on your bed be placed up before your health care provider leaves the room.  · Ask family or friends to sit with you.  · Ask for things that are out of your reach, such as your glasses, hearing aids, telephone, bedside table, or call button.  Follow these tips to avoid falling:  · Stay lying or seated, rather than standing, while waiting for help.  · Wear rubber-soled slippers or shoes whenever you walk in the hospital.  · Avoid quick, sudden movements.  ¨ Change positions slowly.  ¨ Sit on the side of your bed before standing.  ¨ Stand up slowly and wait before you start to walk.  · Let your health care provider know if there is a spill on the floor.  · Pay careful attention to the medical equipment, electrical cords, and tubes around you.  · When you need help, use your call button by your bed or in the bathroom. Wait for one of your health care providers to help you.  · If you feel dizzy or unsure of your footing, return to bed and wait for assistance.  · Avoid being distracted by the TV, telephone, or another person in your room.  · Do not lean or support yourself on rolling objects, such as IV poles or bedside tables.     This information is not intended to replace advice given to you by your health care provider. Make sure you discuss any questions you have with your health care provider.     Document Released: 12/15/2001 Document Revised: 01/08/2016 Document Reviewed: 08/25/2013  [employer] Interactive Patient Education ©2016 [employer]       [facility]  CHG 4% Patient Instruction Sheet    Chlorhexidine Before Surgery  Chlorhexidine gluconate (CHG) is a germ-killing (antiseptic) solution that is used to clean the skin. It gets rid of the bacteria that normally live on the skin. Cleaning your  skin with CHG before surgery helps lower the risk for infection after surgery.    How to use CHG solution  · You will take 2 showers, one shower the night before surgery, the second shower the morning of surgery before coming to the hospital.  · Use CHG only as told by your health care provider, and follow the instructions on the label.  · Use CHG solution while taking a shower. Follow these steps when using CHG solution (unless your health care provider gives you different instructions):  1. Start the shower.  2. Use your normal soap and shampoo to wash your face and hair.  3. Turn off the shower or move out of the shower stream.  4. Pour the CHG onto a clean washcloth. Do not use any type of brush or rough-edged sponge.  5. Starting at your neck, lather your body down to your toes. Make sure you:  6. Pay special attention to the part of your body where you will be having surgery. Scrub this area for at least 1 minute.  7. Use the full amount of CHG as directed. Usually, this is one half bottle for each shower.  8. Do not use CHG on your head or face. If the solution gets into your ears or eyes, rinse them well with water.  9. Avoid your genital area.  10. Avoid any areas of skin that have broken skin, cuts, or scrapes.  11. Scrub your back and under your arms. Make sure to wash skin folds.  12. Let the lather sit on your skin for 1-2 minutes or as long as told by your health care  provider.  13. Thoroughly rinse your entire body in the shower. Make sure that all body creases and crevices are rinsed well.  14. Dry off with a clean towel. Do not put any substances on your body afterward, such as powder, lotion, or perfume.  15. Put on clean clothes or pajamas.  16. If it is the night before your surgery, sleep in clean sheets.    What are the risks?  Risks of using CHG include:  · A skin reaction.  · Hearing loss, if CHG gets in your ears.  · Eye injury, if CHG gets in your eyes and is not rinsed out.  · The CHG  product catching fire.  Make sure that you avoid smoking and flames after applying CHG to your skin.  Do not use CHG:  · If you have a chlorhexidine allergy or have previously reacted to chlorhexidine.  · On babies younger than 2 months of age.      On the day of surgery, when you are taken to your room in Outpatient Surgery you will be given a CHG prepackaged cloth to wipe the site for your surgery.  How to use CHG prepackaged cloths  · Follow the instructions on the label.  · Use the CHG cloth on clean, dry skin. Follow these steps when using a CHG cloth (unless your health care provider gives you different instructions):  1. Using the CHG cloth, vigorously scrub the part of your body where you will be having surgery. Scrub using a back-and-forth motion for 3 minutes. The area on your body should be completely wet with CHG when you are finished scrubbing.  2. Do not rinse. Discard the cloth and let the area air-dry for 1 minute. Do not put any substances on your body afterward, such as powder, lotion, or perfume.  Contact a health care provider if:  · Your skin gets irritated after scrubbing.  · You have questions about using your solution or cloth.  Get help right away if:  · Your eyes become very red or swollen.  · Your eyes itch badly.  · Your skin itches badly and is red or swollen.  · Your hearing changes.  · You have trouble seeing.  · You have swelling or tingling in your mouth or throat.  · You have trouble breathing.  · You swallow any chlorhexidine.  Summary  · Chlorhexidine gluconate (CHG) is a germ-killing (antiseptic) solution that is used to clean the skin. Cleaning your skin with CHG before surgery helps lower the risk for infection after surgery.  · You may be given CHG to use at home. It may be in a bottle or in a prepackaged cloth to use on your skin. Carefully follow your health care provider's instructions and the instructions on the product label.  · Do not use CHG if you have a chlorhexidine  allergy.  · Contact your health care provider if your skin gets irritated after scrubbing.  This information is not intended to replace advice given to you by your health care provider. Make sure you discuss any questions you have with your health care provider.  Document Released: 09/11/2013 Document Revised: 11/15/2018 Document Reviewed: 11/15/2018  [employer] Interactive Patient Education © 2019 [employer]          PATIENT/FAMILY/RESPONSIBLE PARTY VERBALIZES UNDERSTANDING OF ABOVE EDUCATION.  COPY OF PAIN SCALE GIVEN AND REVIEWED WITH VERBALIZED UNDERSTANDING.

## 2022-02-25 ENCOUNTER — PRE-ADMISSION TESTING (OUTPATIENT)
Dept: PREADMISSION TESTING | Facility: HOSPITAL | Age: 37
End: 2022-02-25

## 2022-02-25 ENCOUNTER — TRANSCRIBE ORDERS (OUTPATIENT)
Dept: LAB | Facility: HOSPITAL | Age: 37
End: 2022-02-25

## 2022-02-25 VITALS
OXYGEN SATURATION: 100 % | WEIGHT: 134.48 LBS | SYSTOLIC BLOOD PRESSURE: 104 MMHG | HEIGHT: 69 IN | BODY MASS INDEX: 19.92 KG/M2 | HEART RATE: 63 BPM | DIASTOLIC BLOOD PRESSURE: 61 MMHG | RESPIRATION RATE: 16 BRPM

## 2022-02-25 DIAGNOSIS — Z11.59 SCREENING FOR VIRAL DISEASE: Primary | ICD-10-CM

## 2022-02-25 LAB
ALBUMIN SERPL-MCNC: 4.6 G/DL (ref 3.5–5.2)
ALBUMIN/GLOB SERPL: 2 G/DL
ALP SERPL-CCNC: 57 U/L (ref 39–117)
ALT SERPL W P-5'-P-CCNC: 13 U/L (ref 1–33)
ANION GAP SERPL CALCULATED.3IONS-SCNC: 7 MMOL/L (ref 5–15)
AST SERPL-CCNC: 19 U/L (ref 1–32)
BASOPHILS # BLD AUTO: 0.05 10*3/MM3 (ref 0–0.2)
BASOPHILS NFR BLD AUTO: 0.7 % (ref 0–1.5)
BILIRUB SERPL-MCNC: 0.4 MG/DL (ref 0–1.2)
BUN SERPL-MCNC: 20 MG/DL (ref 6–20)
BUN/CREAT SERPL: 27.8 (ref 7–25)
CALCIUM SPEC-SCNC: 8.9 MG/DL (ref 8.6–10.5)
CHLORIDE SERPL-SCNC: 103 MMOL/L (ref 98–107)
CO2 SERPL-SCNC: 27 MMOL/L (ref 22–29)
CREAT SERPL-MCNC: 0.72 MG/DL (ref 0.57–1)
DEPRECATED RDW RBC AUTO: 46.8 FL (ref 37–54)
EOSINOPHIL # BLD AUTO: 0.09 10*3/MM3 (ref 0–0.4)
EOSINOPHIL NFR BLD AUTO: 1.2 % (ref 0.3–6.2)
ERYTHROCYTE [DISTWIDTH] IN BLOOD BY AUTOMATED COUNT: 14.1 % (ref 12.3–15.4)
GFR SERPL CREATININE-BSD FRML MDRD: 92 ML/MIN/1.73
GLOBULIN UR ELPH-MCNC: 2.3 GM/DL
GLUCOSE SERPL-MCNC: 86 MG/DL (ref 65–99)
HCT VFR BLD AUTO: 41.4 % (ref 34–46.6)
HGB BLD-MCNC: 13.3 G/DL (ref 12–15.9)
IMM GRANULOCYTES # BLD AUTO: 0.01 10*3/MM3 (ref 0–0.05)
IMM GRANULOCYTES NFR BLD AUTO: 0.1 % (ref 0–0.5)
LYMPHOCYTES # BLD AUTO: 1.4 10*3/MM3 (ref 0.7–3.1)
LYMPHOCYTES NFR BLD AUTO: 19.4 % (ref 19.6–45.3)
MCH RBC QN AUTO: 29 PG (ref 26.6–33)
MCHC RBC AUTO-ENTMCNC: 32.1 G/DL (ref 31.5–35.7)
MCV RBC AUTO: 90.4 FL (ref 79–97)
MONOCYTES # BLD AUTO: 0.48 10*3/MM3 (ref 0.1–0.9)
MONOCYTES NFR BLD AUTO: 6.7 % (ref 5–12)
NEUTROPHILS NFR BLD AUTO: 5.18 10*3/MM3 (ref 1.7–7)
NEUTROPHILS NFR BLD AUTO: 71.9 % (ref 42.7–76)
NRBC BLD AUTO-RTO: 0 /100 WBC (ref 0–0.2)
PLATELET # BLD AUTO: 236 10*3/MM3 (ref 140–450)
PMV BLD AUTO: 10.1 FL (ref 6–12)
POTASSIUM SERPL-SCNC: 4.2 MMOL/L (ref 3.5–5.2)
PROT SERPL-MCNC: 6.9 G/DL (ref 6–8.5)
RBC # BLD AUTO: 4.58 10*6/MM3 (ref 3.77–5.28)
SODIUM SERPL-SCNC: 137 MMOL/L (ref 136–145)
WBC NRBC COR # BLD: 7.21 10*3/MM3 (ref 3.4–10.8)

## 2022-02-25 PROCEDURE — 80053 COMPREHEN METABOLIC PANEL: CPT

## 2022-02-25 PROCEDURE — 85025 COMPLETE CBC W/AUTO DIFF WBC: CPT

## 2022-02-25 PROCEDURE — 36415 COLL VENOUS BLD VENIPUNCTURE: CPT

## 2022-03-02 ENCOUNTER — LAB (OUTPATIENT)
Dept: LAB | Facility: HOSPITAL | Age: 37
End: 2022-03-02

## 2022-03-02 LAB — SARS-COV-2 ORF1AB RESP QL NAA+PROBE: NOT DETECTED

## 2022-03-02 PROCEDURE — U0004 COV-19 TEST NON-CDC HGH THRU: HCPCS | Performed by: SPECIALIST

## 2022-03-02 PROCEDURE — C9803 HOPD COVID-19 SPEC COLLECT: HCPCS | Performed by: SPECIALIST

## 2022-03-04 ENCOUNTER — HOSPITAL ENCOUNTER (OUTPATIENT)
Facility: HOSPITAL | Age: 37
Setting detail: HOSPITAL OUTPATIENT SURGERY
Discharge: HOME | End: 2022-03-04
Attending: SPECIALIST | Admitting: SPECIALIST

## 2022-03-04 ENCOUNTER — ANESTHESIA (OUTPATIENT)
Dept: PERIOP | Facility: HOSPITAL | Age: 37
End: 2022-03-04

## 2022-03-04 ENCOUNTER — ANESTHESIA EVENT (OUTPATIENT)
Dept: PERIOP | Facility: HOSPITAL | Age: 37
End: 2022-03-04

## 2022-03-04 VITALS
RESPIRATION RATE: 16 BRPM | SYSTOLIC BLOOD PRESSURE: 117 MMHG | OXYGEN SATURATION: 99 % | BODY MASS INDEX: 19.92 KG/M2 | DIASTOLIC BLOOD PRESSURE: 72 MMHG | HEIGHT: 69 IN | HEART RATE: 64 BPM | TEMPERATURE: 98.7 F | WEIGHT: 134.48 LBS

## 2022-03-04 DIAGNOSIS — K42.9 UMBILICAL HERNIA WITHOUT OBSTRUCTION AND WITHOUT GANGRENE: Primary | ICD-10-CM

## 2022-03-04 LAB — B-HCG UR QL: NEGATIVE

## 2022-03-04 PROCEDURE — 25010000002 ONDANSETRON PER 1 MG: Performed by: NURSE ANESTHETIST, CERTIFIED REGISTERED

## 2022-03-04 PROCEDURE — 81025 URINE PREGNANCY TEST: CPT | Performed by: SPECIALIST

## 2022-03-04 PROCEDURE — 25010000002 VANCOMYCIN 1 G RECONSTITUTED SOLUTION 1 EACH VIAL: Performed by: SPECIALIST

## 2022-03-04 PROCEDURE — 25010000002 PROPOFOL 10 MG/ML EMULSION: Performed by: NURSE ANESTHETIST, CERTIFIED REGISTERED

## 2022-03-04 PROCEDURE — 25010000002 MIDAZOLAM PER 1 MG: Performed by: ANESTHESIOLOGY

## 2022-03-04 PROCEDURE — 25010000002 CEFAZOLIN PER 500 MG: Performed by: NURSE ANESTHETIST, CERTIFIED REGISTERED

## 2022-03-04 PROCEDURE — 25010000002 DEXAMETHASONE PER 1 MG: Performed by: NURSE ANESTHETIST, CERTIFIED REGISTERED

## 2022-03-04 RX ORDER — LIDOCAINE HYDROCHLORIDE 40 MG/ML
SOLUTION TOPICAL AS NEEDED
Status: DISCONTINUED | OUTPATIENT
Start: 2022-03-04 | End: 2022-03-04 | Stop reason: SURG

## 2022-03-04 RX ORDER — FLUMAZENIL 0.1 MG/ML
0.2 INJECTION INTRAVENOUS AS NEEDED
Status: DISCONTINUED | OUTPATIENT
Start: 2022-03-04 | End: 2022-03-04 | Stop reason: HOSPADM

## 2022-03-04 RX ORDER — FENTANYL CITRATE 50 UG/ML
25 INJECTION, SOLUTION INTRAMUSCULAR; INTRAVENOUS
Status: DISCONTINUED | OUTPATIENT
Start: 2022-03-04 | End: 2022-03-04 | Stop reason: HOSPADM

## 2022-03-04 RX ORDER — IBUPROFEN 600 MG/1
600 TABLET ORAL ONCE AS NEEDED
Status: COMPLETED | OUTPATIENT
Start: 2022-03-04 | End: 2022-03-04

## 2022-03-04 RX ORDER — OXYCODONE AND ACETAMINOPHEN 7.5; 325 MG/1; MG/1
2 TABLET ORAL EVERY 4 HOURS PRN
Status: DISCONTINUED | OUTPATIENT
Start: 2022-03-04 | End: 2022-03-04 | Stop reason: HOSPADM

## 2022-03-04 RX ORDER — SODIUM CHLORIDE, SODIUM LACTATE, POTASSIUM CHLORIDE, CALCIUM CHLORIDE 600; 310; 30; 20 MG/100ML; MG/100ML; MG/100ML; MG/100ML
100 INJECTION, SOLUTION INTRAVENOUS CONTINUOUS
Status: DISCONTINUED | OUTPATIENT
Start: 2022-03-04 | End: 2022-03-04 | Stop reason: HOSPADM

## 2022-03-04 RX ORDER — LIDOCAINE HYDROCHLORIDE 10 MG/ML
0.5 INJECTION, SOLUTION EPIDURAL; INFILTRATION; INTRACAUDAL; PERINEURAL ONCE AS NEEDED
Status: DISCONTINUED | OUTPATIENT
Start: 2022-03-04 | End: 2022-03-04 | Stop reason: HOSPADM

## 2022-03-04 RX ORDER — LIDOCAINE HYDROCHLORIDE 20 MG/ML
INJECTION, SOLUTION EPIDURAL; INFILTRATION; INTRACAUDAL; PERINEURAL AS NEEDED
Status: DISCONTINUED | OUTPATIENT
Start: 2022-03-04 | End: 2022-03-04 | Stop reason: SURG

## 2022-03-04 RX ORDER — ONDANSETRON 2 MG/ML
4 INJECTION INTRAMUSCULAR; INTRAVENOUS ONCE AS NEEDED
Status: DISCONTINUED | OUTPATIENT
Start: 2022-03-04 | End: 2022-03-04 | Stop reason: HOSPADM

## 2022-03-04 RX ORDER — SODIUM CHLORIDE 0.9 % (FLUSH) 0.9 %
3 SYRINGE (ML) INJECTION AS NEEDED
Status: DISCONTINUED | OUTPATIENT
Start: 2022-03-04 | End: 2022-03-04 | Stop reason: HOSPADM

## 2022-03-04 RX ORDER — SCOLOPAMINE TRANSDERMAL SYSTEM 1 MG/1
1 PATCH, EXTENDED RELEASE TRANSDERMAL CONTINUOUS
Status: DISCONTINUED | OUTPATIENT
Start: 2022-03-04 | End: 2022-03-04 | Stop reason: HOSPADM

## 2022-03-04 RX ORDER — OXYCODONE HYDROCHLORIDE AND ACETAMINOPHEN 5; 325 MG/1; MG/1
1 TABLET ORAL ONCE AS NEEDED
Status: DISCONTINUED | OUTPATIENT
Start: 2022-03-04 | End: 2022-03-04 | Stop reason: SDUPTHER

## 2022-03-04 RX ORDER — OXYCODONE AND ACETAMINOPHEN 10; 325 MG/1; MG/1
1 TABLET ORAL ONCE AS NEEDED
Status: DISCONTINUED | OUTPATIENT
Start: 2022-03-04 | End: 2022-03-04 | Stop reason: HOSPADM

## 2022-03-04 RX ORDER — ACETAMINOPHEN 500 MG
1000 TABLET ORAL ONCE
Status: COMPLETED | OUTPATIENT
Start: 2022-03-04 | End: 2022-03-04

## 2022-03-04 RX ORDER — DROPERIDOL 2.5 MG/ML
0.62 INJECTION, SOLUTION INTRAMUSCULAR; INTRAVENOUS ONCE AS NEEDED
Status: DISCONTINUED | OUTPATIENT
Start: 2022-03-04 | End: 2022-03-04 | Stop reason: HOSPADM

## 2022-03-04 RX ORDER — CEFAZOLIN SODIUM 1 G/3ML
INJECTION, POWDER, FOR SOLUTION INTRAMUSCULAR; INTRAVENOUS AS NEEDED
Status: DISCONTINUED | OUTPATIENT
Start: 2022-03-04 | End: 2022-03-04 | Stop reason: SURG

## 2022-03-04 RX ORDER — DEXMEDETOMIDINE HYDROCHLORIDE 100 UG/ML
INJECTION, SOLUTION INTRAVENOUS AS NEEDED
Status: DISCONTINUED | OUTPATIENT
Start: 2022-03-04 | End: 2022-03-04 | Stop reason: SURG

## 2022-03-04 RX ORDER — PROPOFOL 10 MG/ML
VIAL (ML) INTRAVENOUS AS NEEDED
Status: DISCONTINUED | OUTPATIENT
Start: 2022-03-04 | End: 2022-03-04 | Stop reason: SURG

## 2022-03-04 RX ORDER — EPHEDRINE SULFATE 50 MG/ML
INJECTION, SOLUTION INTRAVENOUS AS NEEDED
Status: DISCONTINUED | OUTPATIENT
Start: 2022-03-04 | End: 2022-03-04 | Stop reason: SURG

## 2022-03-04 RX ORDER — MIDAZOLAM HYDROCHLORIDE 1 MG/ML
1 INJECTION INTRAMUSCULAR; INTRAVENOUS
Status: COMPLETED | OUTPATIENT
Start: 2022-03-04 | End: 2022-03-04

## 2022-03-04 RX ORDER — BUPIVACAINE HYDROCHLORIDE AND EPINEPHRINE 2.5; 5 MG/ML; UG/ML
INJECTION, SOLUTION INFILTRATION; PERINEURAL AS NEEDED
Status: DISCONTINUED | OUTPATIENT
Start: 2022-03-04 | End: 2022-03-04 | Stop reason: HOSPADM

## 2022-03-04 RX ORDER — OXYCODONE HYDROCHLORIDE AND ACETAMINOPHEN 5; 325 MG/1; MG/1
1 TABLET ORAL EVERY 4 HOURS PRN
Qty: 30 TABLET | Refills: 0 | Status: SHIPPED | OUTPATIENT
Start: 2022-03-04 | End: 2022-05-23

## 2022-03-04 RX ORDER — SODIUM CHLORIDE 0.9 % (FLUSH) 0.9 %
3-10 SYRINGE (ML) INJECTION AS NEEDED
Status: DISCONTINUED | OUTPATIENT
Start: 2022-03-04 | End: 2022-03-04 | Stop reason: HOSPADM

## 2022-03-04 RX ORDER — ONDANSETRON 2 MG/ML
INJECTION INTRAMUSCULAR; INTRAVENOUS AS NEEDED
Status: DISCONTINUED | OUTPATIENT
Start: 2022-03-04 | End: 2022-03-04 | Stop reason: SURG

## 2022-03-04 RX ORDER — NEOSTIGMINE METHYLSULFATE 5 MG/5 ML
SYRINGE (ML) INTRAVENOUS AS NEEDED
Status: DISCONTINUED | OUTPATIENT
Start: 2022-03-04 | End: 2022-03-04 | Stop reason: SURG

## 2022-03-04 RX ORDER — DEXAMETHASONE SODIUM PHOSPHATE 4 MG/ML
INJECTION, SOLUTION INTRA-ARTICULAR; INTRALESIONAL; INTRAMUSCULAR; INTRAVENOUS; SOFT TISSUE AS NEEDED
Status: DISCONTINUED | OUTPATIENT
Start: 2022-03-04 | End: 2022-03-04 | Stop reason: SURG

## 2022-03-04 RX ORDER — SODIUM CHLORIDE, SODIUM LACTATE, POTASSIUM CHLORIDE, CALCIUM CHLORIDE 600; 310; 30; 20 MG/100ML; MG/100ML; MG/100ML; MG/100ML
1000 INJECTION, SOLUTION INTRAVENOUS CONTINUOUS
Status: DISCONTINUED | OUTPATIENT
Start: 2022-03-04 | End: 2022-03-04 | Stop reason: HOSPADM

## 2022-03-04 RX ORDER — NALOXONE HCL 0.4 MG/ML
0.4 VIAL (ML) INJECTION AS NEEDED
Status: DISCONTINUED | OUTPATIENT
Start: 2022-03-04 | End: 2022-03-04 | Stop reason: HOSPADM

## 2022-03-04 RX ORDER — LABETALOL HYDROCHLORIDE 5 MG/ML
5 INJECTION, SOLUTION INTRAVENOUS
Status: DISCONTINUED | OUTPATIENT
Start: 2022-03-04 | End: 2022-03-04 | Stop reason: HOSPADM

## 2022-03-04 RX ORDER — SUFENTANIL CITRATE 50 UG/ML
INJECTION EPIDURAL; INTRAVENOUS AS NEEDED
Status: DISCONTINUED | OUTPATIENT
Start: 2022-03-04 | End: 2022-03-04 | Stop reason: SURG

## 2022-03-04 RX ORDER — SODIUM CHLORIDE 0.9 % (FLUSH) 0.9 %
3 SYRINGE (ML) INJECTION EVERY 12 HOURS SCHEDULED
Status: DISCONTINUED | OUTPATIENT
Start: 2022-03-04 | End: 2022-03-04 | Stop reason: HOSPADM

## 2022-03-04 RX ORDER — ROCURONIUM BROMIDE 10 MG/ML
INJECTION, SOLUTION INTRAVENOUS AS NEEDED
Status: DISCONTINUED | OUTPATIENT
Start: 2022-03-04 | End: 2022-03-04 | Stop reason: SURG

## 2022-03-04 RX ADMIN — EPHEDRINE SULFATE 10 MG: 50 INJECTION INTRAVENOUS at 07:22

## 2022-03-04 RX ADMIN — SODIUM CHLORIDE, POTASSIUM CHLORIDE, SODIUM LACTATE AND CALCIUM CHLORIDE 1000 ML: 600; 310; 30; 20 INJECTION, SOLUTION INTRAVENOUS at 05:56

## 2022-03-04 RX ADMIN — LIDOCAINE HYDROCHLORIDE 1 EACH: 40 SOLUTION TOPICAL at 07:07

## 2022-03-04 RX ADMIN — MIDAZOLAM 1 MG: 1 INJECTION INTRAMUSCULAR; INTRAVENOUS at 06:38

## 2022-03-04 RX ADMIN — CEFAZOLIN 1 G: 330 INJECTION, POWDER, FOR SOLUTION INTRAMUSCULAR; INTRAVENOUS at 07:15

## 2022-03-04 RX ADMIN — SCOPALAMINE 1 PATCH: 1 PATCH, EXTENDED RELEASE TRANSDERMAL at 06:38

## 2022-03-04 RX ADMIN — IBUPROFEN 600 MG: 600 TABLET ORAL at 08:18

## 2022-03-04 RX ADMIN — ACETAMINOPHEN 1000 MG: 500 TABLET, FILM COATED ORAL at 06:38

## 2022-03-04 RX ADMIN — MIDAZOLAM 1 MG: 1 INJECTION INTRAMUSCULAR; INTRAVENOUS at 07:00

## 2022-03-04 RX ADMIN — SUFENTANIL CITRATE 10 MCG: 50 INJECTION EPIDURAL; INTRAVENOUS at 07:07

## 2022-03-04 RX ADMIN — ROCURONIUM BROMIDE 30 MG: 10 INJECTION INTRAVENOUS at 07:07

## 2022-03-04 RX ADMIN — ONDANSETRON 4 MG: 2 INJECTION INTRAMUSCULAR; INTRAVENOUS at 07:38

## 2022-03-04 RX ADMIN — EPHEDRINE SULFATE 10 MG: 50 INJECTION INTRAVENOUS at 07:36

## 2022-03-04 RX ADMIN — Medication 4 MG: at 07:38

## 2022-03-04 RX ADMIN — PROPOFOL 150 MG: 10 INJECTION, EMULSION INTRAVENOUS at 07:07

## 2022-03-04 RX ADMIN — DEXMEDETOMIDINE 50 MCG: 100 INJECTION, SOLUTION, CONCENTRATE INTRAVENOUS at 07:07

## 2022-03-04 RX ADMIN — GLYCOPYRROLATE 0.4 MG: 0.2 INJECTION, SOLUTION INTRAMUSCULAR; INTRAVENOUS at 07:38

## 2022-03-04 RX ADMIN — LIDOCAINE HYDROCHLORIDE 100 MG: 20 INJECTION, SOLUTION EPIDURAL; INFILTRATION; INTRACAUDAL; PERINEURAL at 07:07

## 2022-03-04 RX ADMIN — DEXAMETHASONE SODIUM PHOSPHATE 4 MG: 4 INJECTION, SOLUTION INTRA-ARTICULAR; INTRALESIONAL; INTRAMUSCULAR; INTRAVENOUS; SOFT TISSUE at 07:38

## 2022-03-04 NOTE — ANESTHESIA PREPROCEDURE EVALUATION
Anesthesia Evaluation     Patient summary reviewed   no history of anesthetic complications:  NPO Solid Status: > 6 hours  NPO Liquid Status: > 6 hours           Airway   Mallampati: I  Dental - normal exam     Pulmonary    (-) not a smoker  Cardiovascular   Exercise tolerance: good (4-7 METS)    (+) hypertension,       Neuro/Psych  (-) seizures, CVA  GI/Hepatic/Renal/Endo    (-) liver disease, no renal disease, diabetes    Musculoskeletal     Abdominal    Substance History      OB/GYN          Other                        Anesthesia Plan    ASA 2     general     intravenous induction     Anesthetic plan, all risks, benefits, and alternatives have been provided, discussed and informed consent has been obtained with: patient.        CODE STATUS:

## 2022-03-04 NOTE — ANESTHESIA POSTPROCEDURE EVALUATION
"Patient: Oneil Kothari    Procedure Summary     Date: 03/04/22 Room / Location:  [facility] OR 09 /  [facility]    Anesthesia Start: 0705 Anesthesia Stop: 0748    Procedure: OPEN PRIMARY REPAIR OF UMBILICAL HERNIA (N/A Abdomen) Diagnosis: (UMBILICAL HERNIA)    Surgeons: Lisa Jauregui MD Provider: Gabino Brooks CRNA    Anesthesia Type: general ASA Status: 2          Anesthesia Type: general    Vitals  Vitals Value Taken Time   /78 03/04/22 0758   Temp 98.7 °F (37.1 °C) 03/04/22 0757   Pulse 68 03/04/22 0759   Resp 14 03/04/22 0757   SpO2 97 % 03/04/22 0759   Vitals shown include unvalidated device data.        Post Anesthesia Care and Evaluation    Patient location during evaluation: PACU  Patient participation: complete - patient participated  Level of consciousness: awake and alert  Pain management: adequate  Airway patency: patent  Anesthetic complications: No anesthetic complications    Cardiovascular status: acceptable  Respiratory status: acceptable  Hydration status: acceptable    Comments: Blood pressure 117/72, pulse 64, temperature 98.7 °F (37.1 °C), temperature source Temporal, resp. rate 16, height 175 cm (68.9\"), weight 61 kg (134 lb 7.7 oz), last menstrual period 02/18/2022, SpO2 99 %, not currently breastfeeding.    Pt discharged from PACU based on aracelis score >8  No anesthesia care post op    "

## 2022-03-04 NOTE — ANESTHESIA PROCEDURE NOTES
Airway  Urgency: elective    Airway not difficult    General Information and Staff    Patient location during procedure: OR  CRNA: Gabino Brooks CRNA    Indications and Patient Condition  Indications for airway management: airway protection    Preoxygenated: yes  MILS maintained throughout  Mask difficulty assessment: 1 - vent by mask    Final Airway Details  Final airway type: endotracheal airway      Successful airway: ETT  Cuffed: yes   Successful intubation technique: direct laryngoscopy  Endotracheal tube insertion site: oral  Blade: Ramirez  Blade size: 2  ETT size (mm): 7.0  Cormack-Lehane Classification: grade IIa - partial view of glottis  Placement verified by: chest auscultation   Cuff volume (mL): 10  Measured from: lips  ETT/EBT  to lips (cm): 20  Number of attempts at approach: 1

## 2022-03-04 NOTE — OP NOTE
Preoperative diagnosis:  Umbilical hernia  Postoperative diagnosis:  Same  Procedure:  Open primary repair umbilical hernia  Surgeon:  Lisa Jauregui MD  Anesthesia:  Get loc  Ebl:  Minimal  Ivf:  See anesthesia  Indications: the patient is a 36-year-old female who presents for umbilical hernia.  The risks, benefits, complications, and possible alternatives were discussed with the patient who agreed to proceed.  Description of procedure: the patient was laid supine. The abdomen was prepped and draped. A curvilinear incision was created at the inferior umbilical ring.  bovie was used to dissect to the fascia.  The umbilical stalk was encircled with hemostat.  The sac was amputated from the fascia.  The defect was less than 1cm.  The fascial edges were grasped with Kocher clamps and reapproximated with 0 prolene in figure of eight fashion x multiple.  The overlying skin was tacked to the fascia with 3-0 vicryl.  The skin was closed with 3-0 and 4-0 vicryl.  Dry dressings were applied. The sponge, needle, and instrument counts were correct.  Complications:  None  Disposition good to pacu  Findings:  1cm defect

## 2022-03-04 NOTE — DISCHARGE INSTRUCTIONS

## 2022-03-04 NOTE — H&P
Lisa Jauregui MD [facility] History and Physical     Referring Provider: Lisa Jauregui MD    Patient Care Team:  NILE Rothman MD as PCP - General (Internal Medicine)    Chief complaint hernia    Subjective .     History of present illness:  The patient is a 36 y.o. female who presents after developing a small umbilical hernia after her two recent pregnancies..    Review of Systems    Review of Systems - General ROS: negative  ENT ROS: negative  Respiratory ROS: no cough, shortness of breath, or wheezing  Cardiovascular ROS: no chest pain or dyspnea on exertion  Gastrointestinal ROS: no abdominal pain, change in bowel habits, or black or bloody stools  Genitourinary ROS: no dysuria, trouble voiding, or hematuria  Dermatological ROS: negative   Breast ROS: negative for breast lumps  Hematological and Lymphatic ROS: negative  Musculoskeletal ROS: negative   Neurological ROS: no TIA or stroke symptoms    Psychological ROS: negative  Endocrine ROS: negative    History  Past Medical History:   Diagnosis Date   • Abnormal Pap smear of cervix    • Gestational hypertension    • Low back pain    • Lumbar herniated disc     RESOLVED   ,   Past Surgical History:   Procedure Laterality Date   • BREAST AUGMENTATION Bilateral    • CERVICAL BIOPSY  W/ LOOP ELECTRODE EXCISION     • WISDOM TOOTH EXTRACTION     ,   Family History   Problem Relation Age of Onset   • Thyroid disease Father    • Hypertension Mother    • No Known Problems Brother    • No Known Problems Sister    • No Known Problems Brother    ,   Social History     Tobacco Use   • Smoking status: Never Smoker   • Smokeless tobacco: Never Used   Vaping Use   • Vaping Use: Never used   Substance Use Topics   • Alcohol use: Yes     Comment: rare    • Drug use: No   ,   Medications Prior to Admission   Medication Sig Dispense Refill Last Dose   • spironolactone (ALDACTONE) 50 MG tablet Take 1 tablet by mouth 2 (Two) Times a Day for acne 60 tablet 2 3/3/2022 at Unknown  time   • Levonorgestrel (MIRENA, 52 MG, IU) by Intrauterine route.       and Allergies:  Patient has no known allergies.    Current Facility-Administered Medications:   •  ceFAZolin (ANCEF) 1,000 mg in sodium chloride 0.9 % 50 mL IVPB, 1,000 mg, Intravenous, Once, Lisa Jauregui MD  •  lactated ringers infusion 1,000 mL, 1,000 mL, Intravenous, Continuous, Lisa Jauregui MD, Last Rate: 25 mL/hr at 03/04/22 0556, 1,000 mL at 03/04/22 0556  •  lactated ringers infusion, 100 mL/hr, Intravenous, Continuous, Ananda Carlos MD  •  lidocaine PF 1% (XYLOCAINE) injection 0.5 mL, 0.5 mL, Intradermal, Once PRN, Lisa Jauregui MD  •  lidocaine PF 1% (XYLOCAINE) injection 0.5 mL, 0.5 mL, Injection, Once PRN, Ananda Carlos MD  •  scopolamine patch 1 mg/72 hr, 1 patch, Transdermal, Continuous, Ananda Carlos MD, 1 patch at 03/04/22 0638  •  sodium chloride 0.9 % flush 3 mL, 3 mL, Intravenous, PRN, Lisa Jauregui MD  •  sodium chloride 0.9 % flush 3 mL, 3 mL, Intravenous, Q12H, Ananda Carlos MD  •  sodium chloride 0.9 % flush 3-10 mL, 3-10 mL, Intravenous, PRN, Ananda Carlos MD    Objective     Vital Signs   Temp:  [97.8 °F (36.6 °C)] 97.8 °F (36.6 °C)  Heart Rate:  [60] 60  Resp:  [16] 16  BP: (122)/(80) 122/80    Physical Exam:  General appearance - alert, well appearing, and in no distress  Mental status - alert, oriented to person, place, and time  Neck - supple, no significant adenopathy  Chest - clear to auscultation, no wheezes, rales or rhonchi, symmetric air entry  Heart - normal rate, regular rhythm, normal S1, S2, no murmurs, rubs, clicks or gallops  Abdomen - soft, nontender, nondistended, no masses or organomegaly  Neurological - alert, oriented, normal speech, no focal findings or movement disorder noted  Musculoskeletal - no joint tenderness, deformity or swelling  Extremities - peripheral pulses normal, no pedal edema, no clubbing or cyanosis    Results Review:     Lab Results (last 24 hours)      Procedure Component Value Units Date/Time    Pregnancy, Urine - Urine, Clean Catch [[id number]]  (Normal) Collected: 03/04/22 0622    Specimen: Urine, Clean Catch Updated: 03/04/22 0623     HCG, Urine QL Negative        Imaging Results (Last 24 Hours)     ** No results found for the last 24 hours. **            Assessment/Plan       Umbilical hernia    She will undergo open repair with possible placement of mesh.  The risks, benefits, complications, and possible alternatives were discussed with the patient who agreed to proceed.      Lisa Jauregui MD  03/04/22  07:04 CST

## 2022-03-23 ENCOUNTER — DOCUMENTATION (OUTPATIENT)
Dept: PHYSICAL THERAPY | Facility: CLINIC | Age: 37
End: 2022-03-23

## 2022-03-23 DIAGNOSIS — L90.5 AXILLARY WEB SYNDROME: Primary | ICD-10-CM

## 2022-03-23 DIAGNOSIS — L76.82 AXILLARY WEB SYNDROME: Primary | ICD-10-CM

## 2022-03-23 NOTE — PROGRESS NOTES
Physical Therapy Discharge Summary    Patient: Oneil Kothari                                                                                     Today's Date: 3/23/2022  :     1985    Date of Initial Visit:          No linked episodes    Patient seen for 5 sessions    Visit Diagnoses:    ICD-10-CM ICD-9-CM   1. Axillary web syndrome  L76.82 998.89    L90.5 709.2       GOALS:  Goals                                          Progress Note due by 2022                                                      Recert due by 3/7/2022     Comments Date Status   No visible or palpable cording remaining in the LUE.  Cording continues to spread out and loosen 21 Not Met   Full ROM and normal use of LUE without pain or tightness.  She is having less tightness with full ROM.  21 Not Met                                DISCHARGE SUMMARY   Discharge date 3/23/2022   Dates of this episode 21 through 21   Number of visits on this episode 5   Reason for discharge patient did not return to therapy   Outcomes achieved Refer to the goals table for specifics on goals   Discharge plan Continue with current home exercise program as instructed   Summary of care Patient had made good improvements and was going to continue to work on the cording at home.   Discharge instruction Will d/c patient to her home maintenance program.

## 2022-05-23 ENCOUNTER — OFFICE VISIT (OUTPATIENT)
Dept: INTERNAL MEDICINE | Facility: CLINIC | Age: 37
End: 2022-05-23

## 2022-05-23 VITALS
HEART RATE: 69 BPM | WEIGHT: 133 LBS | TEMPERATURE: 98.2 F | BODY MASS INDEX: 19.7 KG/M2 | HEIGHT: 69 IN | RESPIRATION RATE: 16 BRPM | OXYGEN SATURATION: 98 % | SYSTOLIC BLOOD PRESSURE: 120 MMHG | DIASTOLIC BLOOD PRESSURE: 80 MMHG

## 2022-05-23 DIAGNOSIS — Z13.6 HYPERTENSION SCREEN: ICD-10-CM

## 2022-05-23 DIAGNOSIS — Z11.59 NEED FOR HEPATITIS C SCREENING TEST: ICD-10-CM

## 2022-05-23 DIAGNOSIS — Z00.00 ENCOUNTER FOR PREVENTIVE CARE: Primary | ICD-10-CM

## 2022-05-23 DIAGNOSIS — Z13.31 DEPRESSION SCREEN: ICD-10-CM

## 2022-05-23 PROCEDURE — 99395 PREV VISIT EST AGE 18-39: CPT | Performed by: INTERNAL MEDICINE

## 2022-05-23 NOTE — PROGRESS NOTES
Chief Complaint   Patient presents with   • Annual Exam       History:  Oneil Kothari is a 36 y.o. female who presents today for evaluation of the above problems.      She presents today for annual physical.  Overall she is feeling very well.  Denies any new issues or complaints.  Her back is good overall.    She did have umbilical hernia repair in March by Dr. Jauregui and is doing well from this standpoint.    She denies any new family history    She is up-to-date on her dental exam.  Has not had eye exam in a long time, but her vision is good without correction    She exercises regularly, 4-5 times per week.  Diet is good    Every now and then she gets a short intermittent flutter in her chest that goes away within a second or 2.    She has had 2 Moderna COVID-19 vaccines.  Not interested in booster at this time.    HPI    Social History     Socioeconomic History   • Marital status:    Tobacco Use   • Smoking status: Never Smoker   • Smokeless tobacco: Never Used   Vaping Use   • Vaping Use: Never used   Substance and Sexual Activity   • Alcohol use: Yes     Comment: rare    • Drug use: No   • Sexual activity: Yes     Partners: Male     Birth control/protection: I.U.D.     Comment: mirena inserted 08/1720       PHQ-9 Depression Screening  Little interest or pleasure in doing things? 0-->not at all   Feeling down, depressed, or hopeless? 0-->not at all   Trouble falling or staying asleep, or sleeping too much?     Feeling tired or having little energy?     Poor appetite or overeating?     Feeling bad about yourself - or that you are a failure or have let yourself or your family down?     Trouble concentrating on things, such as reading the newspaper or watching television?     Moving or speaking so slowly that other people could have noticed? Or the opposite - being so fidgety or restless that you have been moving around a lot more than usual?     Thoughts that you would be better off dead, or of hurting  yourself in some way?     PHQ-9 Total Score 0   If you checked off any problems, how difficult have these problems made it for you to do your work, take care of things at home, or get along with other people?         PHQ-9 Total Score: 0    ROS:  Review of Systems   Constitutional: Negative for activity change, appetite change, fatigue, fever and unexpected weight change.   HENT: Negative.    Eyes: Negative.    Respiratory: Negative for cough, chest tightness and shortness of breath.    Cardiovascular: Negative for chest pain, palpitations and leg swelling.   Gastrointestinal: Negative for abdominal pain, constipation, diarrhea, nausea and vomiting.   Endocrine: Negative for cold intolerance and heat intolerance.   Genitourinary: Negative.    Musculoskeletal: Negative.  Negative for back pain, neck pain and neck stiffness.   Skin: Negative for rash and wound.   Neurological: Negative for dizziness, syncope, weakness, light-headedness and headaches.   Hematological: Negative for adenopathy. Does not bruise/bleed easily.   Psychiatric/Behavioral: Negative for agitation, behavioral problems and confusion.         Current Outpatient Medications:   •  Levonorgestrel (MIRENA, 52 MG, IU), by Intrauterine route., Disp: , Rfl:   •  spironolactone (ALDACTONE) 100 MG tablet, Take 1 tablet by mouth 2 (Two) Times a Day., Disp: 60 tablet, Rfl: 3  •  spironolactone (ALDACTONE) 50 MG tablet, Take 1 tablet by mouth 2 (Two) Times a Day for acne, Disp: 60 tablet, Rfl: 2    Lab Results   Component Value Date    GLUCOSE 86 02/25/2022    BUN 20 02/25/2022    CREATININE 0.72 02/25/2022    EGFRIFNONA 92 02/25/2022    BCR 27.8 (H) 02/25/2022    K 4.2 02/25/2022    CO2 27.0 02/25/2022    CALCIUM 8.9 02/25/2022    ALBUMIN 4.60 02/25/2022    AST 19 02/25/2022    ALT 13 02/25/2022       WBC   Date Value Ref Range Status   02/25/2022 7.21 3.40 - 10.80 10*3/mm3 Final   10/31/2019 6.6 3.4 - 10.8 x10E3/uL Final     RBC   Date Value Ref Range Status    02/25/2022 4.58 3.77 - 5.28 10*6/mm3 Final   10/31/2019 4.46 3.77 - 5.28 x10E6/uL Final     Hemoglobin   Date Value Ref Range Status   02/25/2022 13.3 12.0 - 15.9 g/dL Final     Hematocrit   Date Value Ref Range Status   02/25/2022 41.4 34.0 - 46.6 % Final     MCV   Date Value Ref Range Status   02/25/2022 90.4 79.0 - 97.0 fL Final     MCH   Date Value Ref Range Status   02/25/2022 29.0 26.6 - 33.0 pg Final     MCHC   Date Value Ref Range Status   02/25/2022 32.1 31.5 - 35.7 g/dL Final     RDW   Date Value Ref Range Status   02/25/2022 14.1 12.3 - 15.4 % Final     RDW-SD   Date Value Ref Range Status   02/25/2022 46.8 37.0 - 54.0 fl Final     MPV   Date Value Ref Range Status   02/25/2022 10.1 6.0 - 12.0 fL Final     Platelets   Date Value Ref Range Status   02/25/2022 236 140 - 450 10*3/mm3 Final     Neutrophil %   Date Value Ref Range Status   02/25/2022 71.9 42.7 - 76.0 % Final     Lymphocyte %   Date Value Ref Range Status   02/25/2022 19.4 (L) 19.6 - 45.3 % Final     Monocyte %   Date Value Ref Range Status   02/25/2022 6.7 5.0 - 12.0 % Final     Eosinophil %   Date Value Ref Range Status   02/25/2022 1.2 0.3 - 6.2 % Final     Basophil %   Date Value Ref Range Status   02/25/2022 0.7 0.0 - 1.5 % Final     Immature Grans %   Date Value Ref Range Status   02/25/2022 0.1 0.0 - 0.5 % Final     Neutrophils, Absolute   Date Value Ref Range Status   02/25/2022 5.18 1.70 - 7.00 10*3/mm3 Final     Lymphocytes, Absolute   Date Value Ref Range Status   02/25/2022 1.40 0.70 - 3.10 10*3/mm3 Final     Monocytes, Absolute   Date Value Ref Range Status   02/25/2022 0.48 0.10 - 0.90 10*3/mm3 Final     Eosinophils, Absolute   Date Value Ref Range Status   02/25/2022 0.09 0.00 - 0.40 10*3/mm3 Final     Basophils, Absolute   Date Value Ref Range Status   02/25/2022 0.05 0.00 - 0.20 10*3/mm3 Final     Immature Grans, Absolute   Date Value Ref Range Status   02/25/2022 0.01 0.00 - 0.05 10*3/mm3 Final     nRBC   Date Value Ref  "Range Status   02/25/2022 0.0 0.0 - 0.2 /100 WBC Final         OBJECTIVE:  Visit Vitals  /80 (BP Location: Left arm, Patient Position: Sitting, Cuff Size: Adult)   Pulse 69   Temp 98.2 °F (36.8 °C) (Oral)   Resp 16   Ht 175 cm (68.9\")   Wt 60.3 kg (133 lb)   SpO2 98%   BMI 19.70 kg/m²      Physical Exam  Vitals and nursing note reviewed.   Constitutional:       General: She is not in acute distress.     Appearance: Normal appearance. She is well-developed and normal weight. She is not ill-appearing or diaphoretic.      Comments: Very pleasant   HENT:      Head: Normocephalic and atraumatic.      Right Ear: Tympanic membrane, ear canal and external ear normal. There is no impacted cerumen.      Left Ear: Tympanic membrane, ear canal and external ear normal. There is no impacted cerumen.   Eyes:      Pupils: Pupils are equal, round, and reactive to light.   Neck:      Thyroid: No thyromegaly.      Trachea: Phonation normal.   Cardiovascular:      Rate and Rhythm: Normal rate and regular rhythm.      Heart sounds: No murmur heard.  Pulmonary:      Effort: No respiratory distress.   Abdominal:      General: Abdomen is flat.      Palpations: Abdomen is soft.      Tenderness: There is no abdominal tenderness.   Musculoskeletal:      Right lower leg: No edema.      Left lower leg: No edema.   Skin:     Coloration: Skin is not pale.      Findings: No erythema.   Neurological:      Mental Status: She is alert.      GCS: GCS eye subscore is 4. GCS verbal subscore is 5. GCS motor subscore is 6.      Deep Tendon Reflexes:      Reflex Scores:       Patellar reflexes are 1+ on the right side and 1+ on the left side.  Psychiatric:         Behavior: Behavior normal.         Thought Content: Thought content normal.         Judgment: Judgment normal.         Assessment/Plan    Diagnoses and all orders for this visit:    1. Encounter for preventive care (Primary)  -     Hemoglobin A1c; Future  -     Lipid Panel; Future  -     " Hepatitis C Antibody; Future    2. Need for hepatitis C screening test  -     Hepatitis C Antibody; Future    3. Hypertension screen    4. Depression screen    She did have a CBC and CMP recently and we do not need to repeat these.  I will check A1c lipid panel and hepatitis C antibody.  She is doing very well and would expect these values to be within normal limits.  Hypertension screen is negative with a blood pressure 120/80.  Depression screen was negative with a PHQ 2 of 0.  Continue the same.    Follow-up in 1 year for annual physical or sooner if indicated.    Counseling/Anticipatory Guidance Discussed: nutrition, physical activity, healthy weight, dental health, mental health and immunizations    BMI is within normal parameters. No other follow-up for BMI required.      Return in about 1 year (around 5/23/2023) for Annual physical.    Patient was given instructions and counseling regarding his/her condition or for health maintenance advice. Please see specific information pulled into the AVS if appropriate.     SHAHAB Rothman MD  15:08 CDT  5/24/2022

## 2022-07-13 ENCOUNTER — LAB (OUTPATIENT)
Dept: LAB | Facility: HOSPITAL | Age: 37
End: 2022-07-13

## 2022-07-13 DIAGNOSIS — Z00.00 ENCOUNTER FOR PREVENTIVE CARE: ICD-10-CM

## 2022-07-13 DIAGNOSIS — Z11.59 NEED FOR HEPATITIS C SCREENING TEST: ICD-10-CM

## 2022-07-13 LAB
CHOLEST SERPL-MCNC: 143 MG/DL (ref 0–200)
HBA1C MFR BLD: 5.3 % (ref 4.8–5.6)
HCV AB SER DONR QL: NORMAL
HDLC SERPL-MCNC: 53 MG/DL (ref 40–60)
LDLC SERPL CALC-MCNC: 73 MG/DL (ref 0–100)
LDLC/HDLC SERPL: 1.35 {RATIO}
TRIGL SERPL-MCNC: 91 MG/DL (ref 0–150)
VLDLC SERPL-MCNC: 17 MG/DL (ref 5–40)

## 2022-07-13 PROCEDURE — 36415 COLL VENOUS BLD VENIPUNCTURE: CPT

## 2022-07-13 PROCEDURE — 80061 LIPID PANEL: CPT

## 2022-07-13 PROCEDURE — 83036 HEMOGLOBIN GLYCOSYLATED A1C: CPT

## 2022-07-13 PROCEDURE — 86803 HEPATITIS C AB TEST: CPT

## 2022-09-22 ENCOUNTER — OFFICE VISIT (OUTPATIENT)
Dept: OBSTETRICS AND GYNECOLOGY | Facility: CLINIC | Age: 37
End: 2022-09-22

## 2022-09-22 VITALS
BODY MASS INDEX: 19.99 KG/M2 | HEIGHT: 69 IN | WEIGHT: 135 LBS | SYSTOLIC BLOOD PRESSURE: 102 MMHG | DIASTOLIC BLOOD PRESSURE: 72 MMHG

## 2022-09-22 DIAGNOSIS — Z01.419 ENCOUNTER FOR GYNECOLOGICAL EXAMINATION WITHOUT ABNORMAL FINDING: Primary | ICD-10-CM

## 2022-09-22 DIAGNOSIS — Z97.5 IUD (INTRAUTERINE DEVICE) IN PLACE: ICD-10-CM

## 2022-09-22 PROCEDURE — 99395 PREV VISIT EST AGE 18-39: CPT | Performed by: OBSTETRICS & GYNECOLOGY

## 2022-09-22 NOTE — PROGRESS NOTES
"CC: annual exam    SUBJECTIVE: Oneil Kothari is a 37 y.o. female , para 3, who comes to the office today for annual GYN examination. Her last Pap smear was 9/2020, and was normal. She has no history of cervical dysplasia. She has a Mirena IUD for contraception and is mostly amenorrheic. Her medical history is reviewed. She complains of frequent yeast infections after intercourse, abour once a month.    HPI      Social History     Tobacco Use   • Smoking status: Never Smoker   • Smokeless tobacco: Never Used   Vaping Use   • Vaping Use: Never used   Substance Use Topics   • Alcohol use: Yes     Comment: rare    • Drug use: No        Review of Systems   Constitutional: Negative for fever.   Respiratory: Negative for cough.    Genitourinary: Negative for menstrual problem.   Hematological: Does not bruise/bleed easily.       Visit Vitals  /72 (BP Location: Left arm, Patient Position: Sitting)   Ht 175 cm (68.9\")   Wt 61.2 kg (135 lb)   BMI 19.99 kg/m²      Objective   Physical Exam  Vitals and nursing note reviewed. Exam conducted with a chaperone present.   Constitutional:       General: She is not in acute distress.     Appearance: She is well-developed.   HENT:      Head: Normocephalic and atraumatic.   Cardiovascular:      Rate and Rhythm: Normal rate and regular rhythm.      Heart sounds: No murmur heard.  Pulmonary:      Effort: Pulmonary effort is normal.      Breath sounds: Normal breath sounds.   Chest:   Breasts:      Right: No inverted nipple or mass.      Left: No inverted nipple or mass.       Abdominal:      General: There is no distension.      Palpations: Abdomen is soft.      Tenderness: There is no abdominal tenderness.   Genitourinary:     General: Normal vulva.      Exam position: Lithotomy position.      Pubic Area: No rash.       Labia:         Right: No tenderness or lesion.         Left: No tenderness or lesion.       Vagina: Normal. No vaginal discharge, tenderness or bleeding.      " Cervix: No cervical motion tenderness, discharge or friability.      Uterus: Normal.       Adnexa:         Right: No tenderness or fullness.          Left: No tenderness or fullness.        Comments: IUD strings present  Musculoskeletal:         General: Normal range of motion.      Cervical back: Normal range of motion and neck supple.   Skin:     General: Skin is warm and dry.   Neurological:      Mental Status: She is alert and oriented to person, place, and time.   Psychiatric:         Behavior: Behavior normal.         Judgment: Judgment normal.       Assessment/Plan   Diagnoses and all orders for this visit:    1. Encounter for gynecological examination without abnormal finding (Primary)    2. Mirena IUD in place      We have discussed current Pap smear screening guidelines.  She will return in one year. In the meantime if she develops questions or problems, she will notify the office.

## 2022-11-04 ENCOUNTER — OFFICE VISIT (OUTPATIENT)
Dept: OBSTETRICS AND GYNECOLOGY | Facility: CLINIC | Age: 37
End: 2022-11-04

## 2022-11-04 VITALS
SYSTOLIC BLOOD PRESSURE: 104 MMHG | DIASTOLIC BLOOD PRESSURE: 72 MMHG | WEIGHT: 137 LBS | HEIGHT: 69 IN | BODY MASS INDEX: 20.29 KG/M2

## 2022-11-04 DIAGNOSIS — N89.8 VAGINAL DISCHARGE: Primary | ICD-10-CM

## 2022-11-04 PROCEDURE — 87798 DETECT AGENT NOS DNA AMP: CPT | Performed by: NURSE PRACTITIONER

## 2022-11-04 PROCEDURE — 87481 CANDIDA DNA AMP PROBE: CPT | Performed by: NURSE PRACTITIONER

## 2022-11-04 PROCEDURE — 87661 TRICHOMONAS VAGINALIS AMPLIF: CPT | Performed by: NURSE PRACTITIONER

## 2022-11-04 PROCEDURE — 87512 GARDNER VAG DNA QUANT: CPT | Performed by: NURSE PRACTITIONER

## 2022-11-04 PROCEDURE — 99213 OFFICE O/P EST LOW 20 MIN: CPT | Performed by: NURSE PRACTITIONER

## 2022-11-04 PROCEDURE — 87563 M. GENITALIUM AMP PROBE: CPT | Performed by: NURSE PRACTITIONER

## 2022-11-04 RX ORDER — FLUCONAZOLE 150 MG/1
150 TABLET ORAL EVERY OTHER DAY
Qty: 2 TABLET | Refills: 0 | Status: SHIPPED | OUTPATIENT
Start: 2022-11-04 | End: 2022-11-08

## 2022-11-04 RX ORDER — NYSTATIN 100000 U/G
OINTMENT TOPICAL 2 TIMES DAILY
Qty: 15 G | Refills: 0 | Status: SHIPPED | OUTPATIENT
Start: 2022-11-04

## 2022-11-04 NOTE — PROGRESS NOTES
"Chief Complaint  Vaginal Discharge (Pt is here with c/o vaginal discharge and itching.  Pt states it will clear up and then come back and has been going on for several months.  It seems to be right around when her period would be or right after. )    Subjective          Oneil Kothari presents to [facility]  History of Present Illness    Review of Systems   Constitutional: Negative for activity change, appetite change, fatigue and fever.   Respiratory: Negative for apnea and shortness of breath.    Cardiovascular: Negative for chest pain and palpitations.   Gastrointestinal: Negative for abdominal distention, abdominal pain, constipation, diarrhea, nausea and vomiting.   Endocrine: Negative for cold intolerance and heat intolerance.   Genitourinary: Positive for vaginal discharge (and itching). Negative for difficulty urinating, frequency, pelvic pain and vaginal pain.        Regular tampon, light bleeding    Odor this last time  Discharge, chunky at times     Neurological: Negative for headaches.   Psychiatric/Behavioral: Negative for agitation and sleep disturbance.         Objective   Vital Signs:   /72   Ht 175.3 cm (69\")   Wt 62.1 kg (137 lb)   BMI 20.23 kg/m²     Physical Exam  Exam conducted with a chaperone present.   Constitutional:       Appearance: She is well-developed.   HENT:      Head: Normocephalic.   Neck:      Trachea: No tracheal deviation.   Cardiovascular:      Rate and Rhythm: Normal rate.   Pulmonary:      Breath sounds: Normal breath sounds. No wheezing.   Abdominal:      Palpations: Abdomen is soft.      Tenderness: There is no abdominal tenderness.   Genitourinary:     Exam position: Lithotomy position.      Labia:         Right: No rash, tenderness or lesion.         Left: No rash, tenderness or lesion.       Vagina: Vaginal discharge present. No erythema or tenderness.      Cervix: No cervical motion tenderness or discharge.      Uterus: Not deviated, not " enlarged and not tender.       Adnexa:         Right: No mass, tenderness or fullness.          Left: No mass, tenderness or fullness.        Rectum: Normal.   Skin:     General: Skin is warm and dry.      Findings: No rash.   Neurological:      Mental Status: She is alert and oriented to person, place, and time.   Psychiatric:         Speech: Speech normal.         Behavior: Behavior normal.                  Assessment and Plan      Discussed pt vaginal discharge.   Specimen collected for BV panel.   Pt to use otc women's probiotic.     Diagnoses and all orders for this visit:    1. Vaginal discharge (Primary)  -     Gynecologic Fluid, Supplemental Testing  -     Trichomonas vaginalis, PCR - ThinPrep Vial, Cervix    Other orders  -     nystatin (MYCOSTATIN) 065623 UNIT/GM ointment; Apply topically to the appropriate area as directed with traimcinolone 2 (Two) Times a Day.  Dispense: 15 g; Refill: 0  -     fluconazole (Diflucan) 150 MG tablet; Take 1 tablet by mouth Every Other Day for 2 doses.  Dispense: 2 tablet; Refill: 0          BMI is within normal parameters. No other follow-up for BMI required.       Follow Up   Return for Annual physical.    Patient was given instructions and counseling regarding her condition or for health maintenance advice. Please see specific information pulled into the AVS if appropriate.

## 2022-11-09 LAB
LAB AP CASE REPORT: NORMAL
Lab: NORMAL
PATH INTERP SPEC-IMP: NORMAL
TRICHOMONAS VAGINALIS PCR: NOT DETECTED

## 2022-11-14 NOTE — PATIENT INSTRUCTIONS

## 2023-06-08 ENCOUNTER — OFFICE VISIT (OUTPATIENT)
Dept: INTERNAL MEDICINE | Facility: CLINIC | Age: 38
End: 2023-06-08
Payer: COMMERCIAL

## 2023-06-08 VITALS
HEART RATE: 84 BPM | WEIGHT: 138.7 LBS | DIASTOLIC BLOOD PRESSURE: 80 MMHG | TEMPERATURE: 98.1 F | SYSTOLIC BLOOD PRESSURE: 112 MMHG | BODY MASS INDEX: 20.54 KG/M2 | OXYGEN SATURATION: 99 % | HEIGHT: 69 IN

## 2023-06-08 DIAGNOSIS — Z13.6 HYPERTENSION SCREEN: ICD-10-CM

## 2023-06-08 DIAGNOSIS — Z13.31 DEPRESSION SCREEN: ICD-10-CM

## 2023-06-08 DIAGNOSIS — Z00.00 ENCOUNTER FOR PREVENTIVE CARE: Primary | ICD-10-CM

## 2023-06-08 PROBLEM — M54.16 LUMBAR RADICULOPATHY: Status: RESOLVED | Noted: 2021-06-30 | Resolved: 2023-06-08

## 2023-06-08 PROBLEM — M51.26 LUMBAR DISC HERNIATION: Status: RESOLVED | Noted: 2021-06-30 | Resolved: 2023-06-08

## 2023-06-08 PROCEDURE — 99395 PREV VISIT EST AGE 18-39: CPT | Performed by: INTERNAL MEDICINE

## 2023-06-08 NOTE — PROGRESS NOTES
Chief Complaint   Patient presents with    Annual Exam       History:  Oneil Kothari is a 37 y.o. female who presents today for evaluation of the above problems.      Oneil presents today for annual physical.  She is doing well without any new issues or complaints.    She denies any new family history, social history or surgical history    She denies any tobacco use, recreational drug use and rarely drinks alcohol    Oneil is up-to-date on her dental exam.  Not up-to-date on her eye exam, but does report good vision.    She sometimes gets heart fluttering lasting a few seconds a few times per week when laying down, but is not too bothersome.  She denies any chest pains or shortness of breath.    She is still exercising and her diet is healthy.    HPI    Social History     Socioeconomic History    Marital status:    Tobacco Use    Smoking status: Never    Smokeless tobacco: Never   Vaping Use    Vaping Use: Never used   Substance and Sexual Activity    Alcohol use: Yes     Comment: rare     Drug use: No    Sexual activity: Yes     Partners: Male     Birth control/protection: I.U.D.     Comment: mirena inserted 08/1720       PHQ-9 Depression Screening  Little interest or pleasure in doing things? 0-->not at all   Feeling down, depressed, or hopeless? 0-->not at all   Trouble falling or staying asleep, or sleeping too much?     Feeling tired or having little energy?     Poor appetite or overeating?     Feeling bad about yourself - or that you are a failure or have let yourself or your family down?     Trouble concentrating on things, such as reading the newspaper or watching television?     Moving or speaking so slowly that other people could have noticed? Or the opposite - being so fidgety or restless that you have been moving around a lot more than usual?     Thoughts that you would be better off dead, or of hurting yourself in some way?     PHQ-9 Total Score 0   If you checked off any problems, how  difficult have these problems made it for you to do your work, take care of things at home, or get along with other people?         PHQ-9 Total Score: 0    ROS:  Review of Systems   Constitutional:  Negative for activity change, appetite change, fatigue, fever and unexpected weight change.   HENT: Negative.     Eyes:  Negative for pain and visual disturbance.   Respiratory:  Negative for cough, chest tightness and shortness of breath.    Cardiovascular:  Positive for palpitations (mild). Negative for chest pain and leg swelling.   Gastrointestinal:  Negative for abdominal pain, constipation, diarrhea, nausea and vomiting.   Endocrine: Negative for cold intolerance and heat intolerance.   Genitourinary: Negative.    Musculoskeletal: Negative.  Negative for back pain, neck pain and neck stiffness.   Skin:  Negative for rash and wound.   Neurological:  Negative for dizziness, syncope, weakness, light-headedness and headaches.   Hematological:  Negative for adenopathy. Does not bruise/bleed easily.   Psychiatric/Behavioral:  Negative for agitation, behavioral problems and confusion.        Current Outpatient Medications:     Levonorgestrel (MIRENA, 52 MG, IU), by Intrauterine route., Disp: , Rfl:     spironolactone (ALDACTONE) 100 MG tablet, Take one tablet by mouth twice daily, Disp: 60 tablet, Rfl: 3    Lab Results   Component Value Date    GLUCOSE 86 02/25/2022    BUN 20 02/25/2022    CREATININE 0.72 02/25/2022    BCR 27.8 (H) 02/25/2022    K 4.2 02/25/2022    CO2 27.0 02/25/2022    CALCIUM 8.9 02/25/2022    ALBUMIN 4.60 02/25/2022    BILITOT 0.4 02/25/2022    AST 19 02/25/2022    ALT 13 02/25/2022       WBC   Date Value Ref Range Status   02/25/2022 7.21 3.40 - 10.80 10*3/mm3 Final   10/31/2019 6.6 3.4 - 10.8 x10E3/uL Final     RBC   Date Value Ref Range Status   02/25/2022 4.58 3.77 - 5.28 10*6/mm3 Final   10/31/2019 4.46 3.77 - 5.28 x10E6/uL Final     Hemoglobin   Date Value Ref Range Status   02/25/2022 13.3 12.0  - 15.9 g/dL Final     Hematocrit   Date Value Ref Range Status   02/25/2022 41.4 34.0 - 46.6 % Final     MCV   Date Value Ref Range Status   02/25/2022 90.4 79.0 - 97.0 fL Final     MCH   Date Value Ref Range Status   02/25/2022 29.0 26.6 - 33.0 pg Final     MCHC   Date Value Ref Range Status   02/25/2022 32.1 31.5 - 35.7 g/dL Final     RDW   Date Value Ref Range Status   02/25/2022 14.1 12.3 - 15.4 % Final     RDW-SD   Date Value Ref Range Status   02/25/2022 46.8 37.0 - 54.0 fl Final     MPV   Date Value Ref Range Status   02/25/2022 10.1 6.0 - 12.0 fL Final     Platelets   Date Value Ref Range Status   02/25/2022 236 140 - 450 10*3/mm3 Final     Neutrophil %   Date Value Ref Range Status   02/25/2022 71.9 42.7 - 76.0 % Final     Lymphocyte %   Date Value Ref Range Status   02/25/2022 19.4 (L) 19.6 - 45.3 % Final     Monocyte %   Date Value Ref Range Status   02/25/2022 6.7 5.0 - 12.0 % Final     Eosinophil %   Date Value Ref Range Status   02/25/2022 1.2 0.3 - 6.2 % Final     Basophil %   Date Value Ref Range Status   02/25/2022 0.7 0.0 - 1.5 % Final     Immature Grans %   Date Value Ref Range Status   02/25/2022 0.1 0.0 - 0.5 % Final     Neutrophils, Absolute   Date Value Ref Range Status   02/25/2022 5.18 1.70 - 7.00 10*3/mm3 Final     Lymphocytes, Absolute   Date Value Ref Range Status   02/25/2022 1.40 0.70 - 3.10 10*3/mm3 Final     Monocytes, Absolute   Date Value Ref Range Status   02/25/2022 0.48 0.10 - 0.90 10*3/mm3 Final     Eosinophils, Absolute   Date Value Ref Range Status   02/25/2022 0.09 0.00 - 0.40 10*3/mm3 Final     Basophils, Absolute   Date Value Ref Range Status   02/25/2022 0.05 0.00 - 0.20 10*3/mm3 Final     Immature Grans, Absolute   Date Value Ref Range Status   02/25/2022 0.01 0.00 - 0.05 10*3/mm3 Final     nRBC   Date Value Ref Range Status   02/25/2022 0.0 0.0 - 0.2 /100 WBC Final         OBJECTIVE:  Visit Vitals  /80 (BP Location: Left arm, Patient Position: Sitting, Cuff Size:  "Adult)   Pulse 84   Temp 98.1 °F (36.7 °C) (Temporal)   Ht 175.3 cm (69\")   Wt 62.9 kg (138 lb 11.2 oz)   SpO2 99%   BMI 20.48 kg/m²      Physical Exam  Vitals reviewed.   Constitutional:       General: She is not in acute distress.     Appearance: Normal appearance. She is well-developed.   HENT:      Head: Normocephalic and atraumatic.      Right Ear: Tympanic membrane, ear canal and external ear normal. There is no impacted cerumen.      Left Ear: Ear canal and external ear normal. A middle ear effusion (Mild) is present. There is no impacted cerumen.      Mouth/Throat:      Pharynx: No oropharyngeal exudate.   Eyes:      General: No scleral icterus.     Conjunctiva/sclera: Conjunctivae normal.      Pupils: Pupils are equal, round, and reactive to light.   Neck:      Thyroid: No thyromegaly.      Vascular: No JVD.   Cardiovascular:      Rate and Rhythm: Normal rate and regular rhythm.      Heart sounds: Normal heart sounds. No murmur heard.    No friction rub. No gallop.   Pulmonary:      Effort: Pulmonary effort is normal. No respiratory distress.      Breath sounds: Normal breath sounds. No stridor. No wheezing, rhonchi or rales.   Abdominal:      General: Bowel sounds are normal. There is no distension.      Palpations: Abdomen is soft.      Tenderness: There is no abdominal tenderness.   Skin:     General: Skin is warm and dry.      Coloration: Skin is not jaundiced.   Neurological:      Mental Status: She is alert.      Cranial Nerves: No cranial nerve deficit.   Psychiatric:         Mood and Affect: Mood normal.         Behavior: Behavior normal.         Thought Content: Thought content normal.         Judgment: Judgment normal.       Assessment/Plan    Diagnoses and all orders for this visit:    1. Encounter for preventive care (Primary)  -     CBC (No Diff); Future  -     Comprehensive Metabolic Panel; Future  -     Hemoglobin A1c; Future  -     Lipid Panel; Future    2. Depression screen    3. " Hypertension screen      Overall, Oneil is doing very well.  We will check some general labs as above today.  Her depression screen was negative with a PHQ 2 of 0.  Hypertension screen was negative but they blood pressure 112/80.    I believe her minor palpitations to be normal at this time.  However, she will let me know if symptoms worsen and we can obtain EKG or a Holter monitor if necessary.    She declines COVID-19 booster today.    We discussed screening tests including mammogram and colorectal cancer screening.  Typically, I start mammograms at the age of 40 without family history.  However, gynecology may start earlier and she will talk with them.  Colon cancer screening with colonoscopy or Cologuard at the age of 45 unless she develops symptoms.    Follow-up in 1 year or sooner if indicated    Counseling/Anticipatory Guidance Discussed: nutrition, physical activity, healthy weight, misuse of tobacco, alcohol and drugs, dental health, mental health, and immunizations    BMI is within normal parameters. No other follow-up for BMI required.      Return in about 1 year (around 6/8/2024) for Annual physical.    Patient was given instructions and counseling regarding his/her condition or for health maintenance advice. Please see specific information pulled into the AVS if appropriate.     SHAHAB Rothman MD  08:07 CDT  6/8/2023

## 2023-06-15 ENCOUNTER — LAB (OUTPATIENT)
Dept: LAB | Facility: HOSPITAL | Age: 38
End: 2023-06-15
Payer: COMMERCIAL

## 2023-06-15 DIAGNOSIS — Z00.00 ENCOUNTER FOR PREVENTIVE CARE: ICD-10-CM

## 2023-06-15 LAB
ALBUMIN SERPL-MCNC: 4.6 G/DL (ref 3.5–5.2)
ALBUMIN/GLOB SERPL: 1.8 G/DL
ALP SERPL-CCNC: 41 U/L (ref 39–117)
ALT SERPL W P-5'-P-CCNC: 8 U/L (ref 1–33)
ANION GAP SERPL CALCULATED.3IONS-SCNC: 14.2 MMOL/L (ref 5–15)
AST SERPL-CCNC: 19 U/L (ref 1–32)
BILIRUB SERPL-MCNC: 0.5 MG/DL (ref 0–1.2)
BUN SERPL-MCNC: 15 MG/DL (ref 6–20)
BUN/CREAT SERPL: 15.6 (ref 7–25)
CALCIUM SPEC-SCNC: 9.5 MG/DL (ref 8.6–10.5)
CHLORIDE SERPL-SCNC: 104 MMOL/L (ref 98–107)
CHOLEST SERPL-MCNC: 160 MG/DL (ref 0–200)
CO2 SERPL-SCNC: 21.8 MMOL/L (ref 22–29)
CREAT SERPL-MCNC: 0.96 MG/DL (ref 0.57–1)
DEPRECATED RDW RBC AUTO: 41.2 FL (ref 37–54)
EGFRCR SERPLBLD CKD-EPI 2021: 78.3 ML/MIN/1.73
ERYTHROCYTE [DISTWIDTH] IN BLOOD BY AUTOMATED COUNT: 12.5 % (ref 12.3–15.4)
GLOBULIN UR ELPH-MCNC: 2.5 GM/DL
GLUCOSE SERPL-MCNC: 83 MG/DL (ref 65–99)
HBA1C MFR BLD: 5.3 % (ref 4.8–5.6)
HCT VFR BLD AUTO: 44.7 % (ref 34–46.6)
HDLC SERPL-MCNC: 54 MG/DL (ref 40–60)
HGB BLD-MCNC: 14.9 G/DL (ref 12–15.9)
LDLC SERPL CALC-MCNC: 94 MG/DL (ref 0–100)
LDLC/HDLC SERPL: 1.74 {RATIO}
MCH RBC QN AUTO: 30.4 PG (ref 26.6–33)
MCHC RBC AUTO-ENTMCNC: 33.3 G/DL (ref 31.5–35.7)
MCV RBC AUTO: 91.2 FL (ref 79–97)
PLATELET # BLD AUTO: 217 10*3/MM3 (ref 140–450)
PMV BLD AUTO: 10 FL (ref 6–12)
POTASSIUM SERPL-SCNC: 4.6 MMOL/L (ref 3.5–5.2)
PROT SERPL-MCNC: 7.1 G/DL (ref 6–8.5)
RBC # BLD AUTO: 4.9 10*6/MM3 (ref 3.77–5.28)
SODIUM SERPL-SCNC: 140 MMOL/L (ref 136–145)
TRIGL SERPL-MCNC: 59 MG/DL (ref 0–150)
VLDLC SERPL-MCNC: 12 MG/DL (ref 5–40)
WBC NRBC COR # BLD: 5.44 10*3/MM3 (ref 3.4–10.8)

## 2023-06-15 PROCEDURE — 85027 COMPLETE CBC AUTOMATED: CPT

## 2023-06-15 PROCEDURE — 36415 COLL VENOUS BLD VENIPUNCTURE: CPT

## 2023-06-15 PROCEDURE — 80053 COMPREHEN METABOLIC PANEL: CPT

## 2023-06-15 PROCEDURE — 80061 LIPID PANEL: CPT

## 2023-06-15 PROCEDURE — 83036 HEMOGLOBIN GLYCOSYLATED A1C: CPT

## 2023-08-23 RX ORDER — DICLOFENAC SODIUM 75 MG/1
75 TABLET, DELAYED RELEASE ORAL 2 TIMES DAILY
Qty: 60 TABLET | Refills: 3 | Status: SHIPPED | OUTPATIENT
Start: 2023-08-23

## 2023-09-27 ENCOUNTER — OFFICE VISIT (OUTPATIENT)
Dept: OBSTETRICS AND GYNECOLOGY | Facility: CLINIC | Age: 38
End: 2023-09-27
Payer: COMMERCIAL

## 2023-09-27 VITALS
SYSTOLIC BLOOD PRESSURE: 104 MMHG | HEIGHT: 69 IN | WEIGHT: 137 LBS | DIASTOLIC BLOOD PRESSURE: 68 MMHG | BODY MASS INDEX: 20.29 KG/M2

## 2023-09-27 DIAGNOSIS — Z01.419 ENCOUNTER FOR GYNECOLOGICAL EXAMINATION: Primary | ICD-10-CM

## 2023-09-27 DIAGNOSIS — Z12.31 ENCOUNTER FOR MAMMOGRAM TO ESTABLISH BASELINE MAMMOGRAM: ICD-10-CM

## 2023-09-27 PROCEDURE — 87624 HPV HI-RISK TYP POOLED RSLT: CPT | Performed by: NURSE PRACTITIONER

## 2023-09-27 PROCEDURE — G0123 SCREEN CERV/VAG THIN LAYER: HCPCS | Performed by: NURSE PRACTITIONER

## 2023-09-27 NOTE — PROGRESS NOTES
"Chief Complaint  Annual Exam (Pt is here for an annual exam/Last pap 9/21/20 WNL/Pt has no complaints )    Subjective          Oneil Kothari presents to [facility]  History of Present Illness    The patient is present for her annual examination.    She has no new questions or concerns.    The patient denies constipation or diarrhea.    She denies urinary incontinence.  The patient confirms occasional urinary frequency.  She takes Spironolactone 2 tablets daily.    Her menstrual cycles on Mirena are light and occur every 6 to 8 weeks.  The patient has had the Mirena in place since 06/2020.    Review of Systems   Constitutional:  Negative for activity change, appetite change, fatigue and fever.   HENT:  Negative for congestion, sore throat and trouble swallowing.    Eyes:  Negative for pain, discharge and visual disturbance.   Respiratory:  Negative for apnea, shortness of breath and wheezing.    Cardiovascular:  Negative for chest pain, palpitations and leg swelling.   Gastrointestinal:  Negative for abdominal pain, constipation and diarrhea.   Genitourinary:  Negative for frequency, pelvic pain, urgency and vaginal discharge.        Mirena   Musculoskeletal:  Negative for back pain and gait problem.   Skin:  Negative for color change and rash.   Neurological:  Negative for dizziness, weakness and numbness.   Psychiatric/Behavioral:  Negative for confusion and sleep disturbance.       Objective   Vital Signs:   /68   Ht 175.3 cm (69\")   Wt 62.1 kg (137 lb)   BMI 20.23 kg/m²     Physical Exam  Vitals and nursing note reviewed. Exam conducted with a chaperone present.   Constitutional:       General: She is not in acute distress.     Appearance: She is well-developed. She is not diaphoretic.   HENT:      Head: Normocephalic.      Right Ear: External ear normal.      Left Ear: External ear normal.      Nose: Nose normal.   Eyes:      General: No scleral icterus.        Right eye: No " discharge.         Left eye: No discharge.      Conjunctiva/sclera: Conjunctivae normal.      Pupils: Pupils are equal, round, and reactive to light.   Neck:      Thyroid: No thyromegaly.      Vascular: No carotid bruit.      Trachea: No tracheal deviation.   Cardiovascular:      Rate and Rhythm: Normal rate and regular rhythm.      Heart sounds: Normal heart sounds. No murmur heard.  Pulmonary:      Effort: Pulmonary effort is normal. No respiratory distress.      Breath sounds: Normal breath sounds. No wheezing.   Chest:   Breasts:     Breasts are symmetrical.      Right: Normal. No swelling, bleeding, inverted nipple, mass, nipple discharge, skin change or tenderness.      Left: Normal. No swelling, bleeding, inverted nipple, mass, nipple discharge, skin change or tenderness.   Abdominal:      General: There is no distension.      Palpations: Abdomen is soft. There is no mass.      Tenderness: There is no abdominal tenderness. There is no right CVA tenderness, left CVA tenderness or guarding.      Hernia: No hernia is present. There is no hernia in the left inguinal area or right inguinal area.   Genitourinary:     General: Normal vulva.      Exam position: Lithotomy position.      Labia:         Right: No rash, tenderness, lesion or injury.         Left: No rash, tenderness, lesion or injury.       Vagina: Normal. No signs of injury and foreign body. No vaginal discharge, erythema, tenderness or bleeding.      Cervix: Normal.      Uterus: Normal. Not enlarged, not fixed and not tender.       Adnexa: Right adnexa normal and left adnexa normal.        Right: No mass, tenderness or fullness.          Left: No mass, tenderness or fullness.        Rectum: Normal. No mass.      Comments:   BSU normal  Urethral meatus  Normal  Perineum  Normal  Musculoskeletal:         General: No tenderness. Normal range of motion.      Cervical back: Normal range of motion and neck supple.   Lymphadenopathy:      Head:      Right  side of head: No submental, submandibular, tonsillar, preauricular, posterior auricular or occipital adenopathy.      Left side of head: No submental, submandibular, tonsillar, preauricular, posterior auricular or occipital adenopathy.      Cervical: No cervical adenopathy.      Right cervical: No superficial, deep or posterior cervical adenopathy.     Left cervical: No superficial, deep or posterior cervical adenopathy.      Upper Body:      Right upper body: No supraclavicular, axillary or pectoral adenopathy.      Left upper body: No supraclavicular, axillary or pectoral adenopathy.      Lower Body: No right inguinal adenopathy. No left inguinal adenopathy.   Skin:     General: Skin is warm and dry.      Findings: No bruising, erythema or rash.   Neurological:      Mental Status: She is alert and oriented to person, place, and time.      Coordination: Coordination normal.   Psychiatric:         Mood and Affect: Mood normal.         Behavior: Behavior normal.         Thought Content: Thought content normal.         Judgment: Judgment normal.       Result Review :                     Assessment and Plan      Well woman GYN exam.   Pap smear done per ASCCP guidelines.   Will have lab work at PCP.     Educated the patient spironolactone may cause an increase in urinary frequency.  Educated the patient coffee, tea, sodas, and spicy foods can irritate the bladder lining.  Advised the patient to increase water intake.  Recommended Kegel exercises daily.  Instructed the patient to use proper body mechanics when pushing, pulling. or lifting.  Advised pt pelvic floor PT is available.     Encouraged SBE, pt is aware how to do self breast exam and the importance of same.   Discussed weight management and importance of maintaining a healthy weight.   Discussed Vitamin D intake and the importance of adequate vitamin D for both Bone Health and a healthy immune system.    Discussed Daily exercise and the importance of same, in  regards to a healthy heart as well as helping to maintain her weight and improving her mental health.     Discussed STD prevention and testing.   Pt declines Chlamydia/Gonorrhea/Trichomonas, RPR, Hep panel and HIV testing.     M[facility]ogram will [facility] scheduled at [facility].     Diagnoses and all orders for this visit:    1. Encounter for gynecological examination (Primary)  -     Liquid-based Pap Smear, Screening    2. Encounter for mammogram to establish baseline mammogram  -     Mammo Screening Digital Tomosynthesis Bilateral With CAD; Future          BMI is within normal parameters. No other follow-up for BMI required.       Follow Up   Return for Annual physical.    Patient was given instructions and counseling regarding her condition or for health maintenance advice. Please see specific information pulled into the AVS if appropriate.     Transcribed from ambient dictation for RICKY Young by Miriam Woody.  09/27/23   09:49 CDT    Patient or patient representative verbalized consent to the visit recording.  I have personally performed the services described in this document as transcribed by the above individual, and it is both accurate and complete.  RICKY Young  9/27/2023  13:13 CDT

## 2023-09-27 NOTE — PATIENT INSTRUCTIONS

## 2023-09-29 LAB
GEN CATEG CVX/VAG CYTO-IMP: NORMAL
HPV I/H RISK 4 DNA CVX QL PROBE+SIG AMP: NOT DETECTED
LAB AP CASE REPORT: NORMAL
LAB AP GYN ADDITIONAL INFORMATION: NORMAL
LAB AP GYN OTHER FINDINGS: NORMAL
Lab: NORMAL
PATH INTERP SPEC-IMP: NORMAL
STAT OF ADQ CVX/VAG CYTO-IMP: NORMAL

## 2023-10-02 ENCOUNTER — TELEPHONE (OUTPATIENT)
Dept: OBSTETRICS AND GYNECOLOGY | Facility: CLINIC | Age: 38
End: 2023-10-02
Payer: COMMERCIAL

## 2023-10-02 RX ORDER — METRONIDAZOLE 500 MG/1
500 TABLET ORAL 2 TIMES DAILY
Qty: 14 TABLET | Refills: 0 | Status: SHIPPED | OUTPATIENT
Start: 2023-10-02 | End: 2023-10-09

## 2023-10-02 NOTE — TELEPHONE ENCOUNTER
----- Message from RICKY Young sent at 10/1/2023 10:07 PM CDT -----  Pap and HPV negative.     Pap note indicates possible BV. If pt is having symptoms (vaginal discharge, irritation and/or odor) then we can either send in Flagyl 500 mg PO BID x 7 days  OR the current specimen can be sent for a BV panel.     [employer] message sent.

## 2023-10-07 LAB
NCCN CRITERIA FLAG: NORMAL
TYRER CUZICK SCORE: 15.6

## 2023-10-12 ENCOUNTER — HOSPITAL ENCOUNTER (OUTPATIENT)
Dept: MAMMOGRAPHY | Facility: HOSPITAL | Age: 38
Discharge: HOME | End: 2023-10-12
Admitting: NURSE PRACTITIONER
Payer: COMMERCIAL

## 2023-10-12 DIAGNOSIS — Z12.31 ENCOUNTER FOR MAMMOGRAM TO ESTABLISH BASELINE MAMMOGRAM: ICD-10-CM

## 2023-10-12 PROCEDURE — 77063 BREAST TOMOSYNTHESIS BI: CPT

## 2023-10-12 PROCEDURE — 77067 SCR MAMMO BI INCL CAD: CPT

## 2024-02-12 DIAGNOSIS — H10.9 BACTERIAL CONJUNCTIVITIS OF LEFT EYE: Primary | ICD-10-CM

## 2024-02-12 DIAGNOSIS — H10.9 BACTERIAL CONJUNCTIVITIS OF RIGHT EYE: ICD-10-CM

## 2024-02-12 RX ORDER — POLYMYXIN B SULFATE AND TRIMETHOPRIM 1; 10000 MG/ML; [USP'U]/ML
1 SOLUTION OPHTHALMIC EVERY 4 HOURS
Qty: 10 ML | Refills: 0 | Status: SHIPPED | OUTPATIENT
Start: 2024-02-12 | End: 2024-03-17

## 2024-02-28 ENCOUNTER — PATIENT MESSAGE (OUTPATIENT)
Dept: INTERNAL MEDICINE | Facility: CLINIC | Age: 39
End: 2024-02-28
Payer: COMMERCIAL

## 2024-02-28 DIAGNOSIS — H10.9 BACTERIAL CONJUNCTIVITIS OF RIGHT EYE: ICD-10-CM

## 2024-02-28 RX ORDER — POLYMYXIN B SULFATE AND TRIMETHOPRIM 1; 10000 MG/ML; [USP'U]/ML
1 SOLUTION OPHTHALMIC EVERY 4 HOURS
Qty: 10 ML | Refills: 0 | Status: SHIPPED | OUTPATIENT
Start: 2024-02-28 | End: 2024-03-06

## 2024-02-28 NOTE — TELEPHONE ENCOUNTER
We can restart her eyedrops.  You mind sending them to me?  If this does not resolve her symptoms she may need to see her eye doctor.  Thanks

## 2024-06-11 ENCOUNTER — OFFICE VISIT (OUTPATIENT)
Dept: INTERNAL MEDICINE | Facility: CLINIC | Age: 39
End: 2024-06-11
Payer: COMMERCIAL

## 2024-06-11 VITALS
WEIGHT: 140.5 LBS | BODY MASS INDEX: 20.81 KG/M2 | HEART RATE: 75 BPM | TEMPERATURE: 97.2 F | OXYGEN SATURATION: 100 % | DIASTOLIC BLOOD PRESSURE: 70 MMHG | HEIGHT: 69 IN | SYSTOLIC BLOOD PRESSURE: 120 MMHG

## 2024-06-11 DIAGNOSIS — Z00.00 ENCOUNTER FOR PREVENTIVE CARE: Primary | ICD-10-CM

## 2024-06-11 DIAGNOSIS — Z13.31 DEPRESSION SCREEN: ICD-10-CM

## 2024-06-11 DIAGNOSIS — R07.89 ATYPICAL CHEST PAIN: ICD-10-CM

## 2024-06-11 DIAGNOSIS — Z13.6 HYPERTENSION SCREEN: ICD-10-CM

## 2024-06-11 PROCEDURE — 99395 PREV VISIT EST AGE 18-39: CPT | Performed by: INTERNAL MEDICINE

## 2024-06-11 NOTE — PROGRESS NOTES
Chief Complaint   Patient presents with    Annual Exam       History:  Oneil Kothari is a 38 y.o. female who presents today for evaluation of the above problems.      Oneil presents today for annual physical.  She is doing well overall.    She denies any new family history, social history or surgical history    She denies any tobacco use, recreational drug use and rarely drinks alcohol    Oneil is up-to-date on her dental exam.  Not up-to-date on her eye exam     She is still exercising and her diet is healthy.    She does endorse left sided chest pain piercing through the shoulder to the scapula on the left for the last few months.  She does not have the pain when she wakes up throughout the day and it is present today and lasts the whole day.  This does not occur with exertion and she is still able to exercise and workout without difficulty.  She thought it may have been the muscle so she went to dry needling with physical therapy.  This did not improve her symptoms.  The pain is not pleuritic and does not occur if she moves certain ways.  She goes to sleep and then when she wakes up the pain is resolved.  She is starting to think the pain is related to stress.    HPI    Social History     Socioeconomic History    Marital status:    Tobacco Use    Smoking status: Never    Smokeless tobacco: Never   Vaping Use    Vaping status: Never Used   Substance and Sexual Activity    Alcohol use: Yes     Comment: rare     Drug use: No    Sexual activity: Yes     Partners: Male     Birth control/protection: I.U.D.     Comment: mirena inserted 08/1720       PHQ-9 Depression Screening  Little interest or pleasure in doing things? 0-->not at all   Feeling down, depressed, or hopeless? 0-->not at all   Trouble falling or staying asleep, or sleeping too much?     Feeling tired or having little energy?     Poor appetite or overeating?     Feeling bad about yourself - or that you are a failure or have let yourself or  your family down?     Trouble concentrating on things, such as reading the newspaper or watching television?     Moving or speaking so slowly that other people could have noticed? Or the opposite - being so fidgety or restless that you have been moving around a lot more than usual?     Thoughts that you would be better off dead, or of hurting yourself in some way?     PHQ-9 Total Score 0   If you checked off any problems, how difficult have these problems made it for you to do your work, take care of things at home, or get along with other people?         PHQ-9 Total Score: 0    ROS:  Review of Systems   Constitutional:  Negative for activity change, appetite change, fatigue, fever and unexpected weight change.   HENT: Negative.     Eyes:  Negative for pain and visual disturbance.   Respiratory:  Negative for cough, chest tightness and shortness of breath.    Cardiovascular:  Positive for palpitations (mild). Negative for chest pain and leg swelling.   Gastrointestinal:  Negative for abdominal pain, constipation, diarrhea, nausea and vomiting.   Endocrine: Negative for cold intolerance and heat intolerance.   Genitourinary: Negative.    Musculoskeletal: Negative.  Negative for back pain, neck pain and neck stiffness.   Skin:  Negative for rash and wound.   Neurological:  Negative for dizziness, syncope, weakness, light-headedness and headaches.   Hematological:  Negative for adenopathy. Does not bruise/bleed easily.   Psychiatric/Behavioral:  Negative for agitation, behavioral problems and confusion.          Current Outpatient Medications:     Levonorgestrel (MIRENA, 52 MG, IU), by Intrauterine route., Disp: , Rfl:     spironolactone (ALDACTONE) 100 MG tablet, Take one tablet by mouth twice daily, Disp: 60 tablet, Rfl: 3    spironolactone (ALDACTONE) 100 MG tablet, Take 1 tablet by mouth 2 (Two) Times a Day., Disp: 60 tablet, Rfl: 3    Lab Results   Component Value Date    GLUCOSE 83 06/15/2023    BUN 15 06/15/2023     CREATININE 0.96 06/15/2023    EGFR 78.3 06/15/2023    BCR 15.6 06/15/2023    K 4.6 06/15/2023    CO2 21.8 (L) 06/15/2023    CALCIUM 9.5 06/15/2023    ALBUMIN 4.6 06/15/2023    BILITOT 0.5 06/15/2023    AST 19 06/15/2023    ALT 8 06/15/2023       WBC   Date Value Ref Range Status   06/15/2023 5.44 3.40 - 10.80 10*3/mm3 Final   10/31/2019 6.6 3.4 - 10.8 x10E3/uL Final     RBC   Date Value Ref Range Status   06/15/2023 4.90 3.77 - 5.28 10*6/mm3 Final   10/31/2019 4.46 3.77 - 5.28 x10E6/uL Final     Hemoglobin   Date Value Ref Range Status   06/15/2023 14.9 12.0 - 15.9 g/dL Final     Hematocrit   Date Value Ref Range Status   06/15/2023 44.7 34.0 - 46.6 % Final     MCV   Date Value Ref Range Status   06/15/2023 91.2 79.0 - 97.0 fL Final     MCH   Date Value Ref Range Status   06/15/2023 30.4 26.6 - 33.0 pg Final     MCHC   Date Value Ref Range Status   06/15/2023 33.3 31.5 - 35.7 g/dL Final     RDW   Date Value Ref Range Status   06/15/2023 12.5 12.3 - 15.4 % Final     RDW-SD   Date Value Ref Range Status   06/15/2023 41.2 37.0 - 54.0 fl Final     MPV   Date Value Ref Range Status   06/15/2023 10.0 6.0 - 12.0 fL Final     Platelets   Date Value Ref Range Status   06/15/2023 217 140 - 450 10*3/mm3 Final     Neutrophil %   Date Value Ref Range Status   02/25/2022 71.9 42.7 - 76.0 % Final     Lymphocyte %   Date Value Ref Range Status   02/25/2022 19.4 (L) 19.6 - 45.3 % Final     Monocyte %   Date Value Ref Range Status   02/25/2022 6.7 5.0 - 12.0 % Final     Eosinophil %   Date Value Ref Range Status   02/25/2022 1.2 0.3 - 6.2 % Final     Basophil %   Date Value Ref Range Status   02/25/2022 0.7 0.0 - 1.5 % Final     Immature Grans %   Date Value Ref Range Status   02/25/2022 0.1 0.0 - 0.5 % Final     Neutrophils, Absolute   Date Value Ref Range Status   02/25/2022 5.18 1.70 - 7.00 10*3/mm3 Final     Lymphocytes, Absolute   Date Value Ref Range Status   02/25/2022 1.40 0.70 - 3.10 10*3/mm3 Final     Monocytes, Absolute  "  Date Value Ref Range Status   02/25/2022 0.48 0.10 - 0.90 10*3/mm3 Final     Eosinophils, Absolute   Date Value Ref Range Status   02/25/2022 0.09 0.00 - 0.40 10*3/mm3 Final     Basophils, Absolute   Date Value Ref Range Status   02/25/2022 0.05 0.00 - 0.20 10*3/mm3 Final     Immature Grans, Absolute   Date Value Ref Range Status   02/25/2022 0.01 0.00 - 0.05 10*3/mm3 Final     nRBC   Date Value Ref Range Status   02/25/2022 0.0 0.0 - 0.2 /100 WBC Final         OBJECTIVE:  Visit Vitals  /70 (BP Location: Left arm, Patient Position: Sitting, Cuff Size: Adult)   Pulse 75   Temp 97.2 °F (36.2 °C) (Temporal)   Ht 175.3 cm (69\")   Wt 63.7 kg (140 lb 8 oz)   SpO2 100%   BMI 20.75 kg/m²      Physical Exam  Vitals reviewed.   Constitutional:       General: She is not in acute distress.     Appearance: Normal appearance. She is well-developed and normal weight. She is not ill-appearing.   HENT:      Head: Normocephalic and atraumatic.      Right Ear: Tympanic membrane, ear canal and external ear normal. There is no impacted cerumen.      Left Ear: Ear canal and external ear normal. A middle ear effusion (Mild) is present. There is no impacted cerumen.      Mouth/Throat:      Pharynx: No oropharyngeal exudate.   Eyes:      General: No scleral icterus.     Conjunctiva/sclera: Conjunctivae normal.      Pupils: Pupils are equal, round, and reactive to light.   Neck:      Thyroid: No thyromegaly.      Vascular: No JVD.   Cardiovascular:      Rate and Rhythm: Normal rate and regular rhythm.      Heart sounds: Normal heart sounds. No murmur heard.     No friction rub. No gallop.   Pulmonary:      Effort: Pulmonary effort is normal. No respiratory distress.      Breath sounds: Normal breath sounds. No stridor. No wheezing, rhonchi or rales.   Chest:      Chest wall: No tenderness.   Abdominal:      General: Bowel sounds are normal. There is no distension.      Palpations: Abdomen is soft.      Tenderness: There is no " abdominal tenderness.   Musculoskeletal:         General: No swelling. Normal range of motion.   Skin:     General: Skin is warm and dry.      Coloration: Skin is not jaundiced.   Neurological:      Mental Status: She is alert.      Cranial Nerves: No cranial nerve deficit.   Psychiatric:         Mood and Affect: Mood normal.         Behavior: Behavior normal.         Thought Content: Thought content normal.         Judgment: Judgment normal.         Assessment/Plan    Diagnoses and all orders for this visit:    1. Encounter for preventive care (Primary)  -     CBC (No Diff); Future  -     Comprehensive Metabolic Panel; Future  -     Hemoglobin A1c; Future  -     Lipid Panel; Future    2. Depression screen    3. Hypertension screen    4. Atypical chest pain        Overall, Oneil is doing very well.  We will check some general labs..  Her depression screen was negative with a PHQ 2 of 0.  Hypertension screen was negative but they blood pressure 120/70.    She has been having chest pain.  This sounds to be atypical.  This could be related to stress.  I have asked her to check her blood pressure while she is experiencing symptoms.  If she starts to develop symptoms with exertion she will let me know as she will likely need further workup.  If symptoms persist she might benefit from either stress test or chest x-ray in this case.    Follow-up in 1 year for annual physical or sooner if indicated    Counseling/Anticipatory Guidance Discussed: nutrition, physical activity, healthy weight, misuse of tobacco, alcohol and drugs, dental health, mental health, and immunizations    BMI is within normal parameters. No other follow-up for BMI required.      Return in about 1 year (around 6/11/2025) for Annual physical.    Patient was given instructions and counseling regarding his/her condition or for health maintenance advice. Please see specific information pulled into the AVS if appropriate.     SHAHAB Rothman MD  08:07  CDT  6/11/2024

## 2024-07-01 ENCOUNTER — LAB (OUTPATIENT)
Dept: LAB | Facility: HOSPITAL | Age: 39
End: 2024-07-01
Payer: COMMERCIAL

## 2024-07-01 DIAGNOSIS — Z00.00 ENCOUNTER FOR PREVENTIVE CARE: ICD-10-CM

## 2024-07-01 LAB
ALBUMIN SERPL-MCNC: 4.6 G/DL (ref 3.5–5.2)
ALBUMIN/GLOB SERPL: 2 G/DL
ALP SERPL-CCNC: 44 U/L (ref 39–117)
ALT SERPL W P-5'-P-CCNC: 7 U/L (ref 1–33)
ANION GAP SERPL CALCULATED.3IONS-SCNC: 7 MMOL/L (ref 5–15)
AST SERPL-CCNC: 15 U/L (ref 1–32)
BILIRUB SERPL-MCNC: 0.3 MG/DL (ref 0–1.2)
BUN SERPL-MCNC: 15 MG/DL (ref 6–20)
BUN/CREAT SERPL: 18.8 (ref 7–25)
CALCIUM SPEC-SCNC: 9.3 MG/DL (ref 8.6–10.5)
CHLORIDE SERPL-SCNC: 104 MMOL/L (ref 98–107)
CHOLEST SERPL-MCNC: 147 MG/DL (ref 0–200)
CO2 SERPL-SCNC: 27 MMOL/L (ref 22–29)
CREAT SERPL-MCNC: 0.8 MG/DL (ref 0.57–1)
DEPRECATED RDW RBC AUTO: 44.5 FL (ref 37–54)
EGFRCR SERPLBLD CKD-EPI 2021: 96.9 ML/MIN/1.73
ERYTHROCYTE [DISTWIDTH] IN BLOOD BY AUTOMATED COUNT: 13 % (ref 12.3–15.4)
GLOBULIN UR ELPH-MCNC: 2.3 GM/DL
GLUCOSE SERPL-MCNC: 94 MG/DL (ref 65–99)
HBA1C MFR BLD: 5.1 % (ref 4.8–5.6)
HCT VFR BLD AUTO: 40 % (ref 34–46.6)
HDLC SERPL-MCNC: 55 MG/DL (ref 40–60)
HGB BLD-MCNC: 13.3 G/DL (ref 12–15.9)
LDLC SERPL CALC-MCNC: 80 MG/DL (ref 0–100)
LDLC/HDLC SERPL: 1.46 {RATIO}
MCH RBC QN AUTO: 31.1 PG (ref 26.6–33)
MCHC RBC AUTO-ENTMCNC: 33.3 G/DL (ref 31.5–35.7)
MCV RBC AUTO: 93.5 FL (ref 79–97)
PLATELET # BLD AUTO: 212 10*3/MM3 (ref 140–450)
PMV BLD AUTO: 9.8 FL (ref 6–12)
POTASSIUM SERPL-SCNC: 4.4 MMOL/L (ref 3.5–5.2)
PROT SERPL-MCNC: 6.9 G/DL (ref 6–8.5)
RBC # BLD AUTO: 4.28 10*6/MM3 (ref 3.77–5.28)
SODIUM SERPL-SCNC: 138 MMOL/L (ref 136–145)
TRIGL SERPL-MCNC: 59 MG/DL (ref 0–150)
VLDLC SERPL-MCNC: 12 MG/DL (ref 5–40)
WBC NRBC COR # BLD AUTO: 4.58 10*3/MM3 (ref 3.4–10.8)

## 2024-07-01 PROCEDURE — 83036 HEMOGLOBIN GLYCOSYLATED A1C: CPT

## 2024-07-01 PROCEDURE — 80061 LIPID PANEL: CPT

## 2024-07-01 PROCEDURE — 36415 COLL VENOUS BLD VENIPUNCTURE: CPT

## 2024-07-01 PROCEDURE — 80053 COMPREHEN METABOLIC PANEL: CPT

## 2024-07-01 PROCEDURE — 85027 COMPLETE CBC AUTOMATED: CPT

## 2024-10-02 ENCOUNTER — OFFICE VISIT (OUTPATIENT)
Dept: OBSTETRICS AND GYNECOLOGY | Age: 39
End: 2024-10-02
Payer: COMMERCIAL

## 2024-10-02 VITALS
HEIGHT: 69 IN | SYSTOLIC BLOOD PRESSURE: 110 MMHG | WEIGHT: 134 LBS | BODY MASS INDEX: 19.85 KG/M2 | DIASTOLIC BLOOD PRESSURE: 76 MMHG

## 2024-10-02 DIAGNOSIS — Z01.419 ENCOUNTER FOR GYNECOLOGICAL EXAMINATION: Primary | ICD-10-CM

## 2024-10-02 DIAGNOSIS — Z12.31 SCREENING MAMMOGRAM, ENCOUNTER FOR: ICD-10-CM

## 2024-10-02 PROCEDURE — G0123 SCREEN CERV/VAG THIN LAYER: HCPCS | Performed by: NURSE PRACTITIONER

## 2024-10-02 PROCEDURE — 87624 HPV HI-RISK TYP POOLED RSLT: CPT | Performed by: NURSE PRACTITIONER

## 2024-10-02 NOTE — PROGRESS NOTES
Chief Complaint  Annual Exam (PT is here for an annual exam/Last pap 9/27/23 WNL; 2019 HPV detected /Last mammogram 10/12/23 Normal /Pt has no complaints today )  History of Present Illness  The patient is a 39-year-old female who presents for an annual exam.    She reports no current health issues. She has an intrauterine device (IUD) in place and experiences regular, light menstrual periods lasting 3 to 4 days, which she manages with a thin panty liner.    She does not report any bowel irregularities such as constipation or diarrhea, and no urinary issues including leakage or incontinence. However, she experiences urinary urgency at times.     She does not report any vaginal discharge, itching, or odor but experiences yeast infections a few times a year.   She recalls a recent episode during a vacation.    She recently experienced a spasm. She also felt a slight cramp last month.    She is currently taking spironolactone for acne, which she finds effective. She has tried discontinuing it, but her acne returns, so she plans to continue the medication long-term.    Subjective          Oneil Kothari presents to [facility]  History of Present Illness    Review of Systems   Constitutional:  Negative for activity change, appetite change, fatigue and fever.   HENT:  Negative for congestion, sore throat and trouble swallowing.    Eyes:  Negative for pain, discharge and visual disturbance.   Respiratory:  Negative for apnea, shortness of breath and wheezing.    Cardiovascular:  Negative for chest pain, palpitations and leg swelling.   Gastrointestinal:  Negative for abdominal pain, constipation and diarrhea.   Genitourinary:  Negative for frequency, pelvic pain, urgency and vaginal discharge (hx of BV).        IUD   Light 3 days of bleeding   Musculoskeletal:  Negative for back pain and gait problem.   Skin:  Negative for color change and rash.   Neurological:  Negative for dizziness,  "weakness and numbness.   Psychiatric/Behavioral:  Negative for confusion and sleep disturbance.         Objective   Vital Signs:   /76   Ht 175.3 cm (69\")   Wt 60.8 kg (134 lb)   BMI 19.79 kg/m²     Physical Exam  Vitals and nursing note reviewed. Exam conducted with a chaperone present.   Constitutional:       General: She is not in acute distress.     Appearance: She is well-developed. She is not diaphoretic.   HENT:      Head: Normocephalic.      Right Ear: External ear normal.      Left Ear: External ear normal.      Nose: Nose normal.   Eyes:      General: No scleral icterus.        Right eye: No discharge.         Left eye: No discharge.      Conjunctiva/sclera: Conjunctivae normal.      Pupils: Pupils are equal, round, and reactive to light.   Neck:      Thyroid: No thyromegaly.      Vascular: No carotid bruit.      Trachea: No tracheal deviation.   Cardiovascular:      Rate and Rhythm: Normal rate and regular rhythm.      Heart sounds: Normal heart sounds. No murmur heard.  Pulmonary:      Effort: Pulmonary effort is normal. No respiratory distress.      Breath sounds: Normal breath sounds. No wheezing.   Chest:   Breasts:     Breasts are symmetrical.      Right: Normal. No swelling, bleeding, inverted nipple, mass, nipple discharge, skin change or tenderness.      Left: Normal. No swelling, bleeding, inverted nipple, mass, nipple discharge, skin change or tenderness.   Abdominal:      General: There is no distension.      Palpations: Abdomen is soft. There is no mass.      Tenderness: There is no abdominal tenderness. There is no right CVA tenderness, left CVA tenderness or guarding.      Hernia: No hernia is present. There is no hernia in the left inguinal area or right inguinal area.   Genitourinary:     General: Normal vulva.      Exam position: Lithotomy position.      Labia:         Right: No rash, tenderness, lesion or injury.         Left: No rash, tenderness, lesion or injury.       Vagina: " Normal. No signs of injury and foreign body. No vaginal discharge, erythema, tenderness or bleeding.      Cervix: Erythema (mild irritation) present.      Uterus: Normal. Not enlarged, not fixed and not tender.       Adnexa: Right adnexa normal and left adnexa normal.        Right: No mass, tenderness or fullness.          Left: No mass, tenderness or fullness.        Rectum: Normal. No mass.      Comments:   BSU normal  Urethral meatus  Normal  Perineum  Normal  Musculoskeletal:         General: No tenderness. Normal range of motion.      Cervical back: Normal range of motion and neck supple.   Lymphadenopathy:      Head:      Right side of head: No submental, submandibular, tonsillar, preauricular, posterior auricular or occipital adenopathy.      Left side of head: No submental, submandibular, tonsillar, preauricular, posterior auricular or occipital adenopathy.      Cervical: No cervical adenopathy.      Right cervical: No superficial, deep or posterior cervical adenopathy.     Left cervical: No superficial, deep or posterior cervical adenopathy.      Upper Body:      Right upper body: No supraclavicular, axillary or pectoral adenopathy.      Left upper body: No supraclavicular, axillary or pectoral adenopathy.      Lower Body: No right inguinal adenopathy. No left inguinal adenopathy.   Skin:     General: Skin is warm and dry.      Findings: No bruising, erythema or rash.   Neurological:      Mental Status: She is alert and oriented to person, place, and time.      Coordination: Coordination normal.   Psychiatric:         Mood and Affect: Mood normal.         Behavior: Behavior normal.         Thought Content: Thought content normal.         Judgment: Judgment normal.       Result Review :                Current Outpatient Medications on File Prior to Visit   Medication Sig    Levonorgestrel (MIRENA, 52 MG, IU) by Intrauterine route.    spironolactone (ALDACTONE) 100 MG tablet Take one tablet by mouth twice  daily    spironolactone (ALDACTONE) 100 MG tablet Take 1 tablet by mouth 2 (Two) Times a Day.     No current facility-administered medications on file prior to visit.          Assessment and Plan      Well woman GYN exam.   Pap smear done per [facility]P guidelines.   Pelvic exam with chaperone present.     Will have lab work at PCP.     Discussed STD prevention and testing.   Pt declines Chlamydia/Gonorrhea/Trichomonas, RPR, Hep panel and HIV testing.     Mammogram will be scheduled at [facility].     Assessment & Plan  1. Urgency   Kegel exercises were recommended to manage urgency. If urgency progresses to leakage or becomes bothersome, pelvic floor physical therapy will be considered.    2. Vaginitis.  Recently treated infection  Pt advised to call with any questions or concerns.   Discussed S&S to report. Pt voiced understanding.       Diagnoses and all orders for this visit:    1. Encounter for gynecological examination (Primary)  -     Liquid-based Pap Smear, Screening  -     HPV DNA Probe, Direct - ThinPrep Vial, Cervix    2. Screening mammogram, encounter for  -     Mammo Screening Digital Tomosynthesis Bilateral With CAD; Future          BMI is within normal parameters. No other follow-up for BMI required.       Follow Up   Return for Annual physical.    Patient was given instructions and counseling regarding her condition or for health maintenance advice. Please see specific information pulled into the AVS if appropriate.     Patient or patient representative verbalized consent for the use of Ambient Listening during the visit with  RICKY Young for chart documentation. 10/13/2024  16:28 CDT

## 2024-10-02 NOTE — PATIENT INSTRUCTIONS

## 2024-10-07 ENCOUNTER — TELEPHONE (OUTPATIENT)
Dept: OBSTETRICS AND GYNECOLOGY | Age: 39
End: 2024-10-07
Payer: COMMERCIAL

## 2024-10-07 RX ORDER — FLUCONAZOLE 150 MG/1
150 TABLET ORAL
Qty: 2 TABLET | Refills: 0 | Status: SHIPPED | OUTPATIENT
Start: 2024-10-07

## 2024-10-07 NOTE — TELEPHONE ENCOUNTER
----- Message from Kat Eisenberg sent at 10/6/2024  5:30 PM CDT -----  Pap and HPV negative.     Pap note indicates possible candida. If pt is not symptomatic then treatment is not necessary. If pt is symptomatic then she may choose to use OTC monistat or Diflucan 150 mg PO every other day x 2 doses can be sent to pharmacy.       [employer] message sent.

## 2024-10-07 NOTE — TELEPHONE ENCOUNTER
See [employer] message.  Pt sent a request wanting Diflucan sent to the pharmacy    Expiration Date (Month Year): 11 2023 Dilution (U/0.1 Cc): 4 Consent: Written consent obtained. Risks include but not limited to lid/brow ptosis, bruising, swelling, diplopia, temporary effect, incomplete chemical denervation. Price Per Unit Or Per Cc In $ (Use Numbers Only, No Special Characters Or $): 13.00 Detail Level: Simple Reconstitution Date (Optional): 10/05 Lot #: [id number] Measure In Units Or Cc's?: units Post-Care Instructions: Patient instructed to not lie down for 4 hours and limit physical activity for 24 hours. Patient instructed not to travel by airplane for 48 hours.

## 2024-10-22 LAB
NCCN CRITERIA FLAG: NORMAL
TYRER CUZICK SCORE: 12.9

## 2024-10-25 ENCOUNTER — HOSPITAL ENCOUNTER (OUTPATIENT)
Dept: MAMMOGRAPHY | Facility: HOSPITAL | Age: 39
Discharge: HOME | End: 2024-10-25
Admitting: NURSE PRACTITIONER
Payer: COMMERCIAL

## 2024-10-25 DIAGNOSIS — Z12.31 SCREENING MAMMOGRAM, ENCOUNTER FOR: ICD-10-CM

## 2024-10-25 PROCEDURE — 77067 SCR MAMMO BI INCL CAD: CPT

## 2024-10-25 PROCEDURE — 77063 BREAST TOMOSYNTHESIS BI: CPT

## 2025-06-06 ENCOUNTER — TELEPHONE (OUTPATIENT)
Dept: OBSTETRICS AND GYNECOLOGY | Age: 40
End: 2025-06-06

## 2025-06-06 NOTE — TELEPHONE ENCOUNTER
Caller: Oneil Kothari  Female, 39 y.o., 1985  MRN: [id number]  CSN: [id number]  Phone: [phone]    Relationship to patient: SELF      Chief complaint: PT REQ A CALL BACK TO MAKE URGENT APPT IF ABLE  VAGINAL ITCHING, DISCHARGE, ODOR FOR APROX 3 DAY'S    Type of visit: GYN FOLLOW    Requested date: URGENT APPT     HUB NEXT AISHWARYA NOT UNTIL 6-30-25

## 2025-06-13 ENCOUNTER — OFFICE VISIT (OUTPATIENT)
Dept: INTERNAL MEDICINE | Facility: CLINIC | Age: 40
End: 2025-06-13
Payer: COMMERCIAL

## 2025-06-13 VITALS
OXYGEN SATURATION: 98 % | HEIGHT: 69 IN | HEART RATE: 72 BPM | SYSTOLIC BLOOD PRESSURE: 120 MMHG | DIASTOLIC BLOOD PRESSURE: 60 MMHG | WEIGHT: 139.1 LBS | BODY MASS INDEX: 20.6 KG/M2 | TEMPERATURE: 97.8 F

## 2025-06-13 DIAGNOSIS — Z13.31 DEPRESSION SCREEN: ICD-10-CM

## 2025-06-13 DIAGNOSIS — Z13.6 HYPERTENSION SCREEN: ICD-10-CM

## 2025-06-13 DIAGNOSIS — Z00.00 ENCOUNTER FOR PREVENTIVE CARE: Primary | ICD-10-CM

## 2025-06-13 PROCEDURE — 99395 PREV VISIT EST AGE 18-39: CPT | Performed by: INTERNAL MEDICINE

## 2025-06-13 NOTE — PROGRESS NOTES
Chief Complaint   Patient presents with    Annual Exam         History:  Oneil Kothari is a 39 y.o. female who presents today for evaluation of the above problems.      HPI  History of Present Illness  The patient presents for a physical exam.    She reports no current health concerns. She has been compliant with her eye and dental examinations, with the most recent eye exam conducted within the past 6 months. She maintains a biannual schedule for her dental check-ups. She reports no new family history, social history, allergies, or surgeries. She does not use tobacco or drugs and seldom consumes alcohol. She reports no depression, chest pains, palpitations, breathing issues, or headaches. Her back is doing well, and she notes improvement the further she gets from her previous back herniation. She underwent a mammogram in 10/2024. She does not experience any leg swelling but mentions occasional fatigue in her legs. She uses compression stockings.    SOCIAL HISTORY  She does not use tobacco or drugs and seldom uses alcohol.    FAMILY HISTORY  There are no new family history.      Social History     Socioeconomic History    Marital status:    Tobacco Use    Smoking status: Never    Smokeless tobacco: Never   Vaping Use    Vaping status: Never Used   Substance and Sexual Activity    Alcohol use: Yes     Comment: rare     Drug use: Never    Sexual activity: Yes     Partners: Male     Birth control/protection: I.U.D.     Comment: mirena inserted 08/1720       PHQ-9 Depression Screening  Little interest or pleasure in doing things? Not at all   Feeling down, depressed, or hopeless? Not at all   PHQ-2 Total Score 0   Trouble falling or staying asleep, or sleeping too much?     Feeling tired or having little energy?     Poor appetite or overeating?     Feeling bad about yourself - or that you are a failure or have let yourself or your family down?     Trouble concentrating on things, such as reading the  newspaper or watching television?     Moving or speaking so slowly that other people could have noticed? Or the opposite - being so fidgety or restless that you have been moving around a lot more than usual?       Thoughts that you would be better off dead, or of hurting yourself in some way?     PHQ-9 Total Score     If you checked off any problems, how difficult have these problems made it for you to do your work, take care of things at home, or get along with other people? Not difficult at all       PHQ-9 Total Score:        ROS:  Review of Systems   Constitutional:  Negative for activity change, appetite change, fatigue, fever and unexpected weight change.   HENT: Negative.     Eyes: Negative.    Respiratory:  Negative for cough, chest tightness and shortness of breath.    Cardiovascular:  Negative for chest pain, palpitations and leg swelling.   Gastrointestinal:  Negative for abdominal pain, constipation, diarrhea, nausea and vomiting.   Endocrine: Negative for cold intolerance and heat intolerance.   Genitourinary: Negative.    Musculoskeletal: Negative.  Negative for back pain, neck pain and neck stiffness.   Skin:  Negative for rash and wound.   Neurological:  Negative for dizziness, syncope, weakness, light-headedness and headaches.   Hematological:  Negative for adenopathy. Does not bruise/bleed easily.   Psychiatric/Behavioral:  Negative for agitation, behavioral problems and confusion.          Current Outpatient Medications:     Levonorgestrel (MIRENA, 52 MG, IU), by Intrauterine route., Disp: , Rfl:     spironolactone (ALDACTONE) 100 MG tablet, Take one tablet by mouth twice daily, Disp: 60 tablet, Rfl: 3    spironolactone (ALDACTONE) 100 MG tablet, Take 1 tablet by mouth 2 (Two) Times a Day., Disp: 60 tablet, Rfl: 3    spironolactone (ALDACTONE) 100 MG tablet, Take 1 tablet by mouth 2 (Two) Times a Day., Disp: 60 tablet, Rfl: 3    Lab Results   Component Value Date    GLUCOSE 94 07/01/2024    BUN 15  07/01/2024    CREATININE 0.80 07/01/2024     07/01/2024    K 4.4 07/01/2024     07/01/2024    CALCIUM 9.3 07/01/2024    PROTEINTOT 6.9 07/01/2024    ALBUMIN 4.6 07/01/2024    ALT 7 07/01/2024    AST 15 07/01/2024    ALKPHOS 44 07/01/2024    BILITOT 0.3 07/01/2024    GLOB 2.3 07/01/2024    AGRATIO 2.0 07/01/2024    BCR 18.8 07/01/2024    ANIONGAP 7.0 07/01/2024    EGFR 96.9 07/01/2024       WBC   Date Value Ref Range Status   07/01/2024 4.58 3.40 - 10.80 10*3/mm3 Final   10/31/2019 6.6 3.4 - 10.8 x10E3/uL Final     RBC   Date Value Ref Range Status   07/01/2024 4.28 3.77 - 5.28 10*6/mm3 Final   10/31/2019 4.46 3.77 - 5.28 x10E6/uL Final     Hemoglobin   Date Value Ref Range Status   07/01/2024 13.3 12.0 - 15.9 g/dL Final     Hematocrit   Date Value Ref Range Status   07/01/2024 40.0 34.0 - 46.6 % Final     MCV   Date Value Ref Range Status   07/01/2024 93.5 79.0 - 97.0 fL Final     MCH   Date Value Ref Range Status   07/01/2024 31.1 26.6 - 33.0 pg Final     MCHC   Date Value Ref Range Status   07/01/2024 33.3 31.5 - 35.7 g/dL Final     RDW   Date Value Ref Range Status   07/01/2024 13.0 12.3 - 15.4 % Final     RDW-SD   Date Value Ref Range Status   07/01/2024 44.5 37.0 - 54.0 fl Final     MPV   Date Value Ref Range Status   07/01/2024 9.8 6.0 - 12.0 fL Final     Platelets   Date Value Ref Range Status   07/01/2024 212 140 - 450 10*3/mm3 Final     Neutrophil %   Date Value Ref Range Status   02/25/2022 71.9 42.7 - 76.0 % Final     Lymphocyte %   Date Value Ref Range Status   02/25/2022 19.4 (L) 19.6 - 45.3 % Final     Monocyte %   Date Value Ref Range Status   02/25/2022 6.7 5.0 - 12.0 % Final     Eosinophil %   Date Value Ref Range Status   02/25/2022 1.2 0.3 - 6.2 % Final     Basophil %   Date Value Ref Range Status   02/25/2022 0.7 0.0 - 1.5 % Final     Immature Grans %   Date Value Ref Range Status   02/25/2022 0.1 0.0 - 0.5 % Final     Neutrophils, Absolute   Date Value Ref Range Status   02/25/2022  "5.18 1.70 - 7.00 10*3/mm3 Final     Lymphocytes, Absolute   Date Value Ref Range Status   02/25/2022 1.40 0.70 - 3.10 10*3/mm3 Final     Monocytes, Absolute   Date Value Ref Range Status   02/25/2022 0.48 0.10 - 0.90 10*3/mm3 Final     Eosinophils, Absolute   Date Value Ref Range Status   02/25/2022 0.09 0.00 - 0.40 10*3/mm3 Final     Basophils, Absolute   Date Value Ref Range Status   02/25/2022 0.05 0.00 - 0.20 10*3/mm3 Final     Immature Grans, Absolute   Date Value Ref Range Status   02/25/2022 0.01 0.00 - 0.05 10*3/mm3 Final     nRBC   Date Value Ref Range Status   02/25/2022 0.0 0.0 - 0.2 /100 WBC Final         OBJECTIVE:  Visit Vitals  /60 (BP Location: Left arm, Patient Position: Sitting, Cuff Size: Adult)   Pulse 72   Temp 97.8 °F (36.6 °C) (Temporal)   Ht 175.3 cm (69\")   Wt 63.1 kg (139 lb 1.6 oz)   SpO2 98%   BMI 20.54 kg/m²      Physical Exam  Vitals reviewed.   Constitutional:       General: She is not in acute distress.     Appearance: Normal appearance. She is well-developed and normal weight. She is not ill-appearing.   HENT:      Head: Normocephalic and atraumatic.      Right Ear: Tympanic membrane, ear canal and external ear normal. No middle ear effusion. There is no impacted cerumen.      Left Ear: Ear canal and external ear normal.  No middle ear effusion. There is no impacted cerumen.      Mouth/Throat:      Pharynx: No oropharyngeal exudate.   Eyes:      General: No scleral icterus.     Conjunctiva/sclera: Conjunctivae normal.      Pupils: Pupils are equal, round, and reactive to light.   Neck:      Thyroid: No thyromegaly.      Vascular: No JVD.   Cardiovascular:      Rate and Rhythm: Normal rate and regular rhythm.      Heart sounds: Normal heart sounds. No murmur heard.     No friction rub. No gallop.   Pulmonary:      Effort: Pulmonary effort is normal. No respiratory distress.      Breath sounds: Normal breath sounds. No stridor. No wheezing, rhonchi or rales.   Chest:      Chest " wall: No tenderness.   Abdominal:      General: Bowel sounds are normal. There is no distension.      Palpations: Abdomen is soft.      Tenderness: There is no abdominal tenderness.   Musculoskeletal:         General: No swelling. Normal range of motion.   Skin:     General: Skin is warm and dry.      Coloration: Skin is not jaundiced.   Neurological:      Mental Status: She is alert.      Cranial Nerves: No cranial nerve deficit.   Psychiatric:         Mood and Affect: Mood normal.         Behavior: Behavior normal.         Thought Content: Thought content normal.         Judgment: Judgment normal.           Assessment/Plan    Diagnoses and all orders for this visit:    1. Encounter for preventive care (Primary)  -     CBC (No Diff); Future  -     Comprehensive Metabolic Panel; Future  -     Hemoglobin A1c; Future  -     Lipid Panel; Future    2. Depression screen    3. Hypertension screen      Assessment & Plan  Annual physical  - Blood pressure is well-regulated at 120/60, and weight remains within a healthy range.  - Up to date on eye and dental exams; last eye exam within the past 6 months, dental exams twice a year.  - Mammogram in 10/2024 was benign; current with vaccinations, including tetanus.  - Laboratory tests ordered, to be conducted after 07/01/2025.    Follow-up  The patient will follow up in 07/2025.    Counseling/Anticipatory Guidance Discussed: physical activity, healthy weight, misuse of tobacco, dental health, mental health, and immunizations    BMI is within normal parameters. No other follow-up for BMI required.      Return in about 1 year (around 6/13/2026) for Annual physical.    Patient was given instructions and counseling regarding his/her condition or for health maintenance advice. Please see specific information pulled into the AVS if appropriate.     SHAHAB Rothman MD  15:02 CDT  6/13/2025   Electronically signed    Patient or patient representative verbalized consent for the use of  Ambient Listening during the visit with  NILE Rothman MD for chart documentation. 6/13/2025  15:39 CDT

## 2025-06-30 ENCOUNTER — LAB (OUTPATIENT)
Dept: LAB | Facility: HOSPITAL | Age: 40
End: 2025-06-30
Payer: COMMERCIAL

## 2025-06-30 DIAGNOSIS — Z00.00 ENCOUNTER FOR PREVENTIVE CARE: ICD-10-CM

## 2025-06-30 LAB
ALBUMIN SERPL-MCNC: 4.5 G/DL (ref 3.5–5.2)
ALBUMIN/GLOB SERPL: 2 G/DL
ALP SERPL-CCNC: 47 U/L (ref 39–117)
ALT SERPL W P-5'-P-CCNC: 9 U/L (ref 1–33)
ANION GAP SERPL CALCULATED.3IONS-SCNC: 9 MMOL/L (ref 5–15)
AST SERPL-CCNC: 17 U/L (ref 1–32)
BILIRUB SERPL-MCNC: 0.4 MG/DL (ref 0–1.2)
BUN SERPL-MCNC: 11.3 MG/DL (ref 6–20)
BUN/CREAT SERPL: 17.9 (ref 7–25)
CALCIUM SPEC-SCNC: 9.2 MG/DL (ref 8.6–10.5)
CHLORIDE SERPL-SCNC: 107 MMOL/L (ref 98–107)
CHOLEST SERPL-MCNC: 151 MG/DL (ref 0–200)
CO2 SERPL-SCNC: 25 MMOL/L (ref 22–29)
CREAT SERPL-MCNC: 0.63 MG/DL (ref 0.57–1)
DEPRECATED RDW RBC AUTO: 41.1 FL (ref 37–54)
EGFRCR SERPLBLD CKD-EPI 2021: 115.9 ML/MIN/1.73
ERYTHROCYTE [DISTWIDTH] IN BLOOD BY AUTOMATED COUNT: 12.3 % (ref 12.3–15.4)
GLOBULIN UR ELPH-MCNC: 2.3 GM/DL
GLUCOSE SERPL-MCNC: 90 MG/DL (ref 65–99)
HBA1C MFR BLD: 5.5 % (ref 4.8–5.6)
HCT VFR BLD AUTO: 42 % (ref 34–46.6)
HDLC SERPL-MCNC: 57 MG/DL (ref 40–60)
HGB BLD-MCNC: 13.7 G/DL (ref 12–15.9)
LDLC SERPL CALC-MCNC: 78 MG/DL (ref 0–100)
LDLC/HDLC SERPL: 1.36 {RATIO}
MCH RBC QN AUTO: 29.8 PG (ref 26.6–33)
MCHC RBC AUTO-ENTMCNC: 32.6 G/DL (ref 31.5–35.7)
MCV RBC AUTO: 91.3 FL (ref 79–97)
PLATELET # BLD AUTO: 203 10*3/MM3 (ref 140–450)
PMV BLD AUTO: 10 FL (ref 6–12)
POTASSIUM SERPL-SCNC: 4 MMOL/L (ref 3.5–5.2)
PROT SERPL-MCNC: 6.8 G/DL (ref 6–8.5)
RBC # BLD AUTO: 4.6 10*6/MM3 (ref 3.77–5.28)
SODIUM SERPL-SCNC: 141 MMOL/L (ref 136–145)
TRIGL SERPL-MCNC: 83 MG/DL (ref 0–150)
VLDLC SERPL-MCNC: 16 MG/DL (ref 5–40)
WBC NRBC COR # BLD AUTO: 4 10*3/MM3 (ref 3.4–10.8)

## 2025-06-30 PROCEDURE — 83036 HEMOGLOBIN GLYCOSYLATED A1C: CPT

## 2025-06-30 PROCEDURE — 85027 COMPLETE CBC AUTOMATED: CPT

## 2025-06-30 PROCEDURE — 80061 LIPID PANEL: CPT

## 2025-06-30 PROCEDURE — 36415 COLL VENOUS BLD VENIPUNCTURE: CPT

## 2025-06-30 PROCEDURE — 80053 COMPREHEN METABOLIC PANEL: CPT

## (undated) DEVICE — ANTIBACTERIAL UNDYED BRAIDED (POLYGLACTIN 910), SYNTHETIC ABSORBABLE SUTURE: Brand: COATED VICRYL

## (undated) DEVICE — PAD MINOR UNIVERSAL: Brand: MEDLINE INDUSTRIES, INC.

## (undated) DEVICE — 3M™ STERI-STRIP™ REINFORCED ADHESIVE SKIN CLOSURES, R1547, 1/2 IN X 4 IN (12 MM X 100 MM), 6 STRIPS/ENVELOPE: Brand: 3M™ STERI-STRIP™

## (undated) DEVICE — ADHS LIQ MASTISOL 2/3ML

## (undated) DEVICE — VAGINAL PREP TRAY: Brand: MEDLINE INDUSTRIES, INC.

## (undated) DEVICE — ELECTRD BLD EZ CLN MOD XLNG 2.75IN

## (undated) DEVICE — DRSNG SURESITE WNDW 2.38X2.75

## (undated) DEVICE — TBG PENCL TELESCP MEGADYNE SMOKE EVAC 10FT

## (undated) DEVICE — SPNG GZ 2S 2X2 8PLY STRL PK/2

## (undated) DEVICE — GLV SURG BIOGEL LTX PF 6 1/2

## (undated) DEVICE — TRAP FLD MINIVAC MEGADYNE 100ML

## (undated) DEVICE — PK TURNOVER RM ADV

## (undated) DEVICE — SUT PROLN 0 MO6 30IN 8418H